# Patient Record
Sex: FEMALE | Race: BLACK OR AFRICAN AMERICAN | NOT HISPANIC OR LATINO | Employment: FULL TIME | URBAN - METROPOLITAN AREA
[De-identification: names, ages, dates, MRNs, and addresses within clinical notes are randomized per-mention and may not be internally consistent; named-entity substitution may affect disease eponyms.]

---

## 2017-01-04 ENCOUNTER — OFFICE VISIT (OUTPATIENT)
Dept: GASTROENTEROLOGY | Facility: CLINIC | Age: 32
End: 2017-01-04
Payer: COMMERCIAL

## 2017-01-04 VITALS
BODY MASS INDEX: 32.71 KG/M2 | HEART RATE: 104 BPM | WEIGHT: 203.5 LBS | HEIGHT: 66 IN | SYSTOLIC BLOOD PRESSURE: 121 MMHG | DIASTOLIC BLOOD PRESSURE: 85 MMHG

## 2017-01-04 DIAGNOSIS — K59.04 CHRONIC IDIOPATHIC CONSTIPATION: Primary | ICD-10-CM

## 2017-01-04 PROCEDURE — 99999 PR PBB SHADOW E&M-EST. PATIENT-LVL III: CPT | Mod: PBBFAC,,, | Performed by: NURSE PRACTITIONER

## 2017-01-04 PROCEDURE — 1159F MED LIST DOCD IN RCRD: CPT | Mod: S$GLB,,, | Performed by: NURSE PRACTITIONER

## 2017-01-04 PROCEDURE — 99214 OFFICE O/P EST MOD 30 MIN: CPT | Mod: S$GLB,,, | Performed by: NURSE PRACTITIONER

## 2017-01-04 NOTE — PROGRESS NOTES
Subjective:       Patient ID: Doroteo Sun is a 31 y.o. female.    Chief Complaint: Medication Refill (Linzess)    HPI  Presents to request refills of Linzess.  She used Linzess 290 mcg previously with good results.  She had a baby in November and reports that during pregnancy she was having regular bowel movements without medication.  However, post partum constipation has returned.  Denies rectal bleeding.  Denies abdominal pain apart from pain associated with constipation that is relieved after bowel movements.  Denies family history of colon cancer or colon polyps.    Review of Systems   Constitutional: Negative.  Negative for activity change, appetite change, fatigue, fever and unexpected weight change.   HENT: Negative.  Negative for sore throat and trouble swallowing.    Respiratory: Negative.  Negative for cough, choking and shortness of breath.    Cardiovascular: Negative.  Negative for chest pain.   Gastrointestinal: Positive for abdominal pain and constipation. Negative for abdominal distention, anal bleeding, blood in stool, diarrhea, nausea and vomiting.   Genitourinary: Negative.  Negative for difficulty urinating, dysuria and hematuria.   Musculoskeletal: Negative.  Negative for neck pain and neck stiffness.   Skin: Negative.    Neurological: Negative.  Negative for dizziness, syncope, weakness and light-headedness.   Psychiatric/Behavioral: Negative.        Objective:      Physical Exam   Constitutional: She is oriented to person, place, and time. She appears well-developed and well-nourished. No distress.   Eyes: No scleral icterus.   Cardiovascular: Normal rate.    Pulmonary/Chest: Effort normal. No respiratory distress.   Abdominal: Soft. Bowel sounds are normal. She exhibits no distension and no mass. There is no tenderness.   Musculoskeletal: Normal range of motion.   Neurological: She is alert and oriented to person, place, and time.   Skin: Skin is warm and dry. She is not diaphoretic. No  pallor.   Psychiatric: She has a normal mood and affect. Her behavior is normal. Judgment and thought content normal.   Vitals reviewed.      Assessment:       1. Chronic idiopathic constipation        Plan:         Doroteo was seen today for medication refill.    Diagnoses and all orders for this visit:    Chronic idiopathic constipation    Other orders  -     linaclotide (LINZESS) 290 mcg Cap; Take 1 capsule (290 mcg total) by mouth once daily.       -   High fiber diet  -   Fiber supplement  -   Sheldahl fluid intake   -   Exercise    -   Resume LInzess

## 2017-01-04 NOTE — PROGRESS NOTES
Doroteo Sun  has not been to PT since 7/18/16 and is therefore discharged from PT.  Please see the last note for her most recent functional status.    Rosa Melgoza, PT, DPT

## 2017-01-06 ENCOUNTER — TELEPHONE (OUTPATIENT)
Dept: GASTROENTEROLOGY | Facility: CLINIC | Age: 32
End: 2017-01-06

## 2017-01-06 RX ORDER — LUBIPROSTONE 8 UG/1
8 CAPSULE ORAL 2 TIMES DAILY
Qty: 60 CAPSULE | Refills: 5 | Status: SHIPPED | OUTPATIENT
Start: 2017-01-06 | End: 2017-07-31

## 2017-01-06 NOTE — TELEPHONE ENCOUNTER
Unable to afford Linzess.  Now $100+ a month.    Will send rx for amitiza to see if this is covered.

## 2017-01-06 NOTE — TELEPHONE ENCOUNTER
----- Message from YVETTE Harrison sent at 1/6/2017  1:06 PM CST -----  Contact: [phone]/self  Let pt know that i have sent in another medication for constipation ( Amitiza)  to see if this is less expensive.  She can check with the pharmacist and let us know if this does not work   ----- Message -----     From: Sivakumar Bloom MA     Sent: 1/6/2017  12:51 PM       To: YVETTE Harrison    Medication is still over  $100.00  ----- Message -----     From: Iqra Gonzalez     Sent: 1/6/2017  12:10 PM       To: [facility]lan [facility] Staff    Pt would like to speak the nurse regarding medication.  Please advise

## 2017-07-31 ENCOUNTER — OFFICE VISIT (OUTPATIENT)
Dept: FAMILY MEDICINE | Facility: CLINIC | Age: 32
End: 2017-07-31
Payer: COMMERCIAL

## 2017-07-31 ENCOUNTER — TELEPHONE (OUTPATIENT)
Dept: FAMILY MEDICINE | Facility: CLINIC | Age: 32
End: 2017-07-31

## 2017-07-31 VITALS
DIASTOLIC BLOOD PRESSURE: 60 MMHG | SYSTOLIC BLOOD PRESSURE: 116 MMHG | HEART RATE: 102 BPM | WEIGHT: 175.06 LBS | OXYGEN SATURATION: 98 % | BODY MASS INDEX: 28.25 KG/M2

## 2017-07-31 DIAGNOSIS — L70.9 ACNE, UNSPECIFIED ACNE TYPE: ICD-10-CM

## 2017-07-31 DIAGNOSIS — F41.9 ANXIETY DISORDER, UNSPECIFIED TYPE: Primary | ICD-10-CM

## 2017-07-31 PROCEDURE — 99214 OFFICE O/P EST MOD 30 MIN: CPT | Mod: S$GLB,,, | Performed by: FAMILY MEDICINE

## 2017-07-31 PROCEDURE — 99999 PR PBB SHADOW E&M-EST. PATIENT-LVL III: CPT | Mod: PBBFAC,,, | Performed by: FAMILY MEDICINE

## 2017-07-31 RX ORDER — DOXYCYCLINE HYCLATE 100 MG
100 TABLET ORAL DAILY
Qty: 30 TABLET | Refills: 0
Start: 2017-07-31 | End: 2018-02-12

## 2017-07-31 RX ORDER — PHENTERMINE HYDROCHLORIDE 37.5 MG/1
37.5 CAPSULE ORAL EVERY MORNING
Qty: 30 CAPSULE | Refills: 0
Start: 2017-07-31 | End: 2017-08-11

## 2017-07-31 RX ORDER — ESCITALOPRAM OXALATE 10 MG/1
10 TABLET ORAL DAILY
Qty: 30 TABLET | Refills: 0 | Status: SHIPPED | OUTPATIENT
Start: 2017-07-31 | End: 2017-08-11 | Stop reason: SDUPTHER

## 2017-07-31 RX ORDER — TAZAROTENE 1 MG/G
CREAM TOPICAL NIGHTLY
Qty: 30 G | Refills: 0
Start: 2017-07-31 | End: 2018-02-12

## 2017-07-31 NOTE — TELEPHONE ENCOUNTER
----- Message from Desi Montes sent at 7/31/2017  7:27 AM CDT -----  No. [phone]   Patient is having anxiety and depression.   She would like an appointment today.   She will see another doctor.   Please call.

## 2017-08-04 ENCOUNTER — TELEPHONE (OUTPATIENT)
Dept: FAMILY MEDICINE | Facility: CLINIC | Age: 32
End: 2017-08-04

## 2017-08-04 ENCOUNTER — DOCUMENTATION ONLY (OUTPATIENT)
Dept: REHABILITATION | Facility: HOSPITAL | Age: 32
End: 2017-08-04

## 2017-08-04 NOTE — TELEPHONE ENCOUNTER
----- Message from Ying Garcia sent at 8/4/2017  8:56 AM CDT -----  Contact: self, [phone]  Patient called in requesting to speak with you regarding medication prescribed at last visit.  Please advise.

## 2017-08-04 NOTE — TELEPHONE ENCOUNTER
Spoke with patient about taking Lexapro during the day its making her to be drowsiness.  Patient was advised as per Dr Lee to take it at night per this weekend and log in how she is feeling.  patient to call on Monday 08/07/17 to check on medication side effects.  Patient also was advised not to drink alcohol with this medication.  Patient verbalized understanding.

## 2017-08-11 ENCOUNTER — OFFICE VISIT (OUTPATIENT)
Dept: FAMILY MEDICINE | Facility: CLINIC | Age: 32
End: 2017-08-11
Payer: COMMERCIAL

## 2017-08-11 VITALS
HEART RATE: 88 BPM | DIASTOLIC BLOOD PRESSURE: 88 MMHG | SYSTOLIC BLOOD PRESSURE: 128 MMHG | HEIGHT: 66 IN | WEIGHT: 167.56 LBS | BODY MASS INDEX: 26.93 KG/M2 | OXYGEN SATURATION: 100 %

## 2017-08-11 DIAGNOSIS — F41.9 ANXIETY DISORDER, UNSPECIFIED TYPE: Primary | ICD-10-CM

## 2017-08-11 DIAGNOSIS — M70.60 TROCHANTERIC BURSITIS, UNSPECIFIED LATERALITY: ICD-10-CM

## 2017-08-11 DIAGNOSIS — F41.9 ANXIETY: ICD-10-CM

## 2017-08-11 PROCEDURE — 3008F BODY MASS INDEX DOCD: CPT | Mod: S$GLB,,, | Performed by: FAMILY MEDICINE

## 2017-08-11 PROCEDURE — 99999 PR PBB SHADOW E&M-EST. PATIENT-LVL III: CPT | Mod: PBBFAC,,, | Performed by: FAMILY MEDICINE

## 2017-08-11 PROCEDURE — 99214 OFFICE O/P EST MOD 30 MIN: CPT | Mod: S$GLB,,, | Performed by: FAMILY MEDICINE

## 2017-08-11 RX ORDER — IBUPROFEN 800 MG/1
800 TABLET ORAL 3 TIMES DAILY PRN
Qty: 60 TABLET | Refills: 2 | Status: SHIPPED | OUTPATIENT
Start: 2017-08-11 | End: 2018-04-11 | Stop reason: SDUPTHER

## 2017-08-11 RX ORDER — ESCITALOPRAM OXALATE 10 MG/1
10 TABLET ORAL DAILY
Qty: 30 TABLET | Refills: 11 | Status: SHIPPED | OUTPATIENT
Start: 2017-08-11 | End: 2017-12-05 | Stop reason: SDUPTHER

## 2017-08-11 RX ORDER — MUPIROCIN 20 MG/G
OINTMENT TOPICAL 3 TIMES DAILY
Qty: 30 G | Refills: 1 | Status: SHIPPED | OUTPATIENT
Start: 2017-08-11 | End: 2018-02-12

## 2017-08-11 NOTE — PROGRESS NOTES
(Portions of this note were dictated using voice recognition software and may contain dictation related errors in spelling/grammar/syntax not found on text review)    CC:   Chief Complaint   Patient presents with    Follow-up    Medication Refill     mupirocin refill       HPI: 31 y.o. female last visit with me was in January 2016.  Recently saw Dr. Lee for anxiety, more pronounced after the birth of her son in November 2016.  Chart was reviewed.  Patient had complained of crying spells, sleep disturbance, episodes of elevated blood pressure.  She was started on Lexapro 10 mg daily.  Overall doing better, however more recently did notice a little bit edginess at night.  She e is taking this medication at night because of drowsiness.  Reports better sleep    Does have good support system with family to help take care of her son if needed    Concomitantly is on Adipex for weight loss, has not taken since Saturday 6 days ago    Would like a refill of ibuprofen that she will take for some back pain and leg pain symptoms.  Since she is back at work now she notices pain more.  She is trying exercise regularly    In the past would have a prescription for mupirocin for what sounds like some folliculitis or furuncle development which would seem to help.  She would like a refill of this just in case she needs it again.      Past Medical History:   Diagnosis Date    Acne     Chronic constipation        No past surgical history on file.    Family History   Problem Relation Age of Onset    Hypertension Other     Cancer Neg Hx     Diabetes Neg Hx     Breast cancer Neg Hx     Colon cancer Neg Hx     Ovarian cancer Neg Hx     Cervical cancer Neg Hx     Endometrial cancer Neg Hx     Vaginal cancer Neg Hx        Social History     Social History    Marital status: Single     Spouse name: N/A    Number of children: N/A    Years of education: N/A     Occupational History     [employer]     Social History Main Topics     Smoking status: Never Smoker    Smokeless tobacco: Never Used    Alcohol use No    Drug use: No    Sexual activity: Yes     Partners: Male     Birth control/ protection: None     Other Topics Concern    Not on file     Social History Narrative    No narrative on file     Lab Results   Component Value Date    WBC 9.55 11/16/2016    HGB 9.4 (L) 11/16/2016    HCT 29.1 (L) 11/16/2016     (H) 11/16/2016    CHOL 156 12/03/2013    TRIG 43 12/03/2013    HDL 64 12/03/2013    ALT 22 11/16/2016    AST 29 11/16/2016     11/16/2016    K 3.9 11/16/2016     11/16/2016    CREATININE 0.8 11/16/2016    BUN 13 11/16/2016    CO2 26 11/16/2016    TSH 0.772 02/12/2015    INR 1.0 11/16/2016    LDLCALC 83.4 12/03/2013    GLU 90 11/16/2016       ROS:  GENERAL: No fever, chills, fatigability or weight loss.  SKIN: Above  HEAD: No headaches.  EYES: No visual changes  EARS: No ear pain or changes in hearing.  NOSE: No congestion or rhinorrhea.  MOUTH & THROAT: No hoarseness, change in voice, or sore throat.  NODES: Denies swollen glands.  CHEST: Denies RANDALL, cyanosis, wheezing, cough and sputum production.  CARDIOVASCULAR: Denies chest pain, PND, orthopnea.  ABDOMEN: No nausea, vomiting, or changes in bowel function.  URINARY: No flank pain, dysuria or hematuria.  PERIPHERAL VASCULAR: No claudication or cyanosis.  MUSCULOSKELETAL: Above  NEUROLOGIC: No weakness or numbness.  PSYCHIATRIC: Above    Vital signs reviewed  PE:   APPEARANCE: Well nourished, well developed, in no acute distress.    HEAD: Normocephalic, atraumatic.  EYES:     Conjunctivae noninjected.   CHEST: Good inspiratory effort. Lungs clear to auscultation with no wheezes or crackles.  CARDIOVASCULAR: Normal S1, S2. No rubs, murmurs, or gallops.   PSYCHIATRIC: Pleasant demeanor.  Affect is euthymic.  Mood is normal today.  Normal speech, thought, judgment, insight.    IMPRESSION  1. Anxiety disorder, unspecified type    2. Trochanteric bursitis,  unspecified laterality            PLAN  Would recommend continuing Lexapro for right now at 10 mg.  Over the course of the next few weeks if she still notices this edginess , can increase to 20 mg.  I would recommend stopping Adipex altogether    Trochanteric bursitis history, has recurrence of symptoms with back and leg pains since going back to work.  I have given her perception of ibuprofen have on hand    For occasional folliculitis or furuncle symptoms, she had been using Bactroban ointment which has been refilled in case she needs it    Call over the next month if she needs increase in Lexapro.  Return to the clinic for any more severe symptoms

## 2017-08-25 ENCOUNTER — TELEPHONE (OUTPATIENT)
Dept: GASTROENTEROLOGY | Facility: CLINIC | Age: 32
End: 2017-08-25

## 2017-12-05 ENCOUNTER — TELEPHONE (OUTPATIENT)
Dept: FAMILY MEDICINE | Facility: CLINIC | Age: 32
End: 2017-12-05

## 2017-12-05 DIAGNOSIS — F41.9 ANXIETY DISORDER, UNSPECIFIED TYPE: ICD-10-CM

## 2017-12-05 RX ORDER — ESCITALOPRAM OXALATE 10 MG/1
15 TABLET ORAL DAILY
Qty: 45 TABLET | Refills: 11 | Status: SHIPPED | OUTPATIENT
Start: 2017-12-05 | End: 2018-06-19 | Stop reason: SDUPTHER

## 2017-12-05 NOTE — TELEPHONE ENCOUNTER
----- Message from Desi Montes sent at 12/5/2017  9:46 AM CST -----  No. [phone]    Patient would like to start taking Lexapro 15mg.  She is taking 10mg now.   [facility] in [place]

## 2017-12-06 ENCOUNTER — TELEPHONE (OUTPATIENT)
Dept: FAMILY MEDICINE | Facility: CLINIC | Age: 32
End: 2017-12-06

## 2017-12-06 NOTE — TELEPHONE ENCOUNTER
----- Message from Demetrio Sow sent at 12/6/2017  8:15 AM CST -----  Contact: self/[phone]  Patient called to state she never received a call back on yesterday regarding her request.    Please call and advise.

## 2017-12-06 NOTE — TELEPHONE ENCOUNTER
Advised patient that increase in dosage was sent to pharmacy yesterday.  Patient wanted to confirm the dosage, stated the person that took her message gave her the dosage but wanted to confirm with me.  Advised that she was to take 1.5 tablet (15mg)

## 2018-02-12 ENCOUNTER — OFFICE VISIT (OUTPATIENT)
Dept: FAMILY MEDICINE | Facility: CLINIC | Age: 33
End: 2018-02-12
Payer: COMMERCIAL

## 2018-02-12 VITALS
SYSTOLIC BLOOD PRESSURE: 116 MMHG | HEIGHT: 66 IN | TEMPERATURE: 99 F | OXYGEN SATURATION: 100 % | HEART RATE: 101 BPM | BODY MASS INDEX: 29.34 KG/M2 | WEIGHT: 182.56 LBS | DIASTOLIC BLOOD PRESSURE: 84 MMHG

## 2018-02-12 DIAGNOSIS — J06.9 UPPER RESPIRATORY TRACT INFECTION, UNSPECIFIED TYPE: Primary | ICD-10-CM

## 2018-02-12 PROCEDURE — 99213 OFFICE O/P EST LOW 20 MIN: CPT | Mod: S$GLB,,, | Performed by: FAMILY MEDICINE

## 2018-02-12 PROCEDURE — 3008F BODY MASS INDEX DOCD: CPT | Mod: S$GLB,,, | Performed by: FAMILY MEDICINE

## 2018-02-12 PROCEDURE — 99999 PR PBB SHADOW E&M-EST. PATIENT-LVL III: CPT | Mod: PBBFAC,,, | Performed by: FAMILY MEDICINE

## 2018-02-12 RX ORDER — ISOTRETINOIN 40 MG/1
40 CAPSULE ORAL 2 TIMES DAILY
COMMUNITY
End: 2018-09-12

## 2018-02-12 NOTE — PROGRESS NOTES
(Portions of this note were dictated using voice recognition software and may contain dictation related errors in spelling/grammar/syntax not found on text review)    CC:   Chief Complaint   Patient presents with    Sinusitis    Nasal Congestion       HPI: 32 y.o. female last seen in August.  Here for urgent care.  Complains of four-day history of mild sore throat, mild cough, significant nasal congestion and sinus pressure mainly on the left side.  No fevers or body aches.  No chest pain or shortness of breath.  No nausea, vomiting, diarrhea.  Multiple sick contacts in her family.  Tried some Advil Cold and Sinus over-the-counter which was of no help.  She later took some Claritin for couple of days    Past Medical History:   Diagnosis Date    Acne     Anxiety     Chronic constipation        No past surgical history on file.    Family History   Problem Relation Age of Onset    Hypertension Other     Cancer Neg Hx     Diabetes Neg Hx     Breast cancer Neg Hx     Colon cancer Neg Hx     Ovarian cancer Neg Hx     Cervical cancer Neg Hx     Endometrial cancer Neg Hx     Vaginal cancer Neg Hx        Social History     Social History    Marital status: Single     Spouse name: N/A    Number of children: N/A    Years of education: N/A     Occupational History     [employer]     Social History Main Topics    Smoking status: Never Smoker    Smokeless tobacco: Never Used    Alcohol use No    Drug use: No    Sexual activity: Yes     Partners: Male     Birth control/ protection: None     Other Topics Concern    Not on file     Social History Narrative    No narrative on file       ROS:  GENERAL: No fever, chills, fatigability or weight loss.  SKIN: No rashes, no itching.  HEAD: Above.  EYES: No visual changes  EARS: No ear pain or changes in hearing.  NOSE: Above.  MOUTH & THROAT: Above.  NODES: Denies swollen glands.  CHEST: Above.  CARDIOVASCULAR: Denies chest pain, PND, orthopnea.  ABDOMEN: No nausea,  vomiting, or changes in bowel function.  URINARY: No flank pain, dysuria or hematuria.  PERIPHERAL VASCULAR: No claudication or cyanosis.  MUSCULOSKELETAL: No joint stiffness or swelling. Denies back pain.  NEUROLOGIC: No weakness or numbness.    Vital signs reviewed  PE:   APPEARANCE: Well nourished, well developed, in no acute distress.    HEAD: Normocephalic, atraumatic.  Right maxillary sinus tenderness  EYES: PERRL. EOMI.   Conjunctivae noninjected.  EARS: TM's intact. Light reflex normal. No retraction or perforation  NOSE left greater than right turbinate edema.  MOUTH & THROAT: No tonsillar enlargement. No pharyngeal erythema or exudate.  Mild cobblestoning noted  NECK: Supple with no cervical lymphadenopathy.    CHEST: Good inspiratory effort. Lungs clear to auscultation with no wheezes or crackles.  CARDIOVASCULAR: Normal S1, S2. No rubs, murmurs, or gallops.  ABDOMEN: Bowel sounds normal. Not distended. Soft. No tenderness or masses. No organomegaly.  EXTREMITIES: No edema, cyanosis, or clubbing.      IMPRESSION  1. Upper respiratory tract infection, unspecified type            PLAN  Advise expectant management with saline nasal rinses, Tylenol or NSAIDS as needed for pain or fever, Mucinex DM as needed for cough, and pseudoephedrine +/-antihistamine as needed for congestion. Throat lozenges and warm salt water gargles as needed for sore throat.  Expect symptom resolution to be between 7-10 days from time of onset of symptoms.  If symptoms last beyond this timeframe or patient develops worsening headaches, purulent nasal discharge, and fever, call or return to the office for further recommendations.

## 2018-03-14 LAB — HUMAN PAPILLOMAVIRUS (HPV): NORMAL

## 2018-03-28 ENCOUNTER — TELEPHONE (OUTPATIENT)
Dept: FAMILY MEDICINE | Facility: CLINIC | Age: 33
End: 2018-03-28

## 2018-03-28 NOTE — TELEPHONE ENCOUNTER
Patient is concerned that she is gaining weight and is concerned that it is the Lexapro that is causing the weight gain.  Please advise.

## 2018-03-28 NOTE — TELEPHONE ENCOUNTER
----- Message from Desi Montes sent at 3/28/2018  7:32 AM CDT -----  No. [phone]    Can Lexapro be the reason patient is gaining weight.   Please call.

## 2018-04-03 RX ORDER — TRIAMCINOLONE ACETONIDE 1 MG/G
CREAM TOPICAL
COMMUNITY
Start: 2018-01-29

## 2018-04-11 ENCOUNTER — TELEPHONE (OUTPATIENT)
Dept: GASTROENTEROLOGY | Facility: CLINIC | Age: 33
End: 2018-04-11

## 2018-04-11 RX ORDER — IBUPROFEN 800 MG/1
800 TABLET ORAL 3 TIMES DAILY PRN
Qty: 60 TABLET | Refills: 2 | Status: SHIPPED | OUTPATIENT
Start: 2018-04-11 | End: 2018-09-14 | Stop reason: SDUPTHER

## 2018-04-11 NOTE — TELEPHONE ENCOUNTER
----- Message from Demetrio Sow sent at 4/11/2018  1:33 PM CDT -----  Contact: self/[phone]  Patient is requesting a refill on her medications.  Please call and advise when sent to pharmacy.    ibuprofen (ADVIL,MOTRIN) 800 MG tablet    [facility] [address] [place] [address] - [address] [place] [address]

## 2018-04-11 NOTE — TELEPHONE ENCOUNTER
----- Message from Merary Macdonald MA sent at 4/11/2018  3:23 PM CDT -----  Contact: self/[phone]  Is this okay to refill?  ----- Message -----  From: Demetrio Sow  Sent: 4/11/2018   1:33 PM  To: Ruslan WAGGONER Staff    Patient is requesting a refill on her medications.  Please call and advise when sent to pharmacy.    linaclotide (LINZESS) 290 mcg [facility] [address] [place] [address]

## 2018-04-13 ENCOUNTER — LAB VISIT (OUTPATIENT)
Dept: LAB | Facility: HOSPITAL | Age: 33
End: 2018-04-13
Attending: FAMILY MEDICINE
Payer: COMMERCIAL

## 2018-04-13 ENCOUNTER — OFFICE VISIT (OUTPATIENT)
Dept: FAMILY MEDICINE | Facility: CLINIC | Age: 33
End: 2018-04-13
Payer: COMMERCIAL

## 2018-04-13 VITALS
DIASTOLIC BLOOD PRESSURE: 90 MMHG | SYSTOLIC BLOOD PRESSURE: 120 MMHG | HEART RATE: 86 BPM | TEMPERATURE: 99 F | HEIGHT: 66 IN | OXYGEN SATURATION: 95 % | BODY MASS INDEX: 29.02 KG/M2 | WEIGHT: 180.56 LBS

## 2018-04-13 DIAGNOSIS — R55 SYNCOPE, UNSPECIFIED SYNCOPE TYPE: Primary | ICD-10-CM

## 2018-04-13 DIAGNOSIS — R03.0 ELEVATED BLOOD PRESSURE READING: ICD-10-CM

## 2018-04-13 DIAGNOSIS — R55 SYNCOPE, UNSPECIFIED SYNCOPE TYPE: ICD-10-CM

## 2018-04-13 LAB
ALBUMIN SERPL BCP-MCNC: 3.7 G/DL
ALP SERPL-CCNC: 74 U/L
ALT SERPL W/O P-5'-P-CCNC: 43 U/L
ANION GAP SERPL CALC-SCNC: 9 MMOL/L
AST SERPL-CCNC: 36 U/L
BASOPHILS # BLD AUTO: 0.02 K/UL
BASOPHILS NFR BLD: 0.5 %
BILIRUB SERPL-MCNC: 0.5 MG/DL
BUN SERPL-MCNC: 12 MG/DL
CALCIUM SERPL-MCNC: 9.4 MG/DL
CHLORIDE SERPL-SCNC: 103 MMOL/L
CHOLEST SERPL-MCNC: 183 MG/DL
CHOLEST/HDLC SERPL: 3.5 {RATIO}
CO2 SERPL-SCNC: 27 MMOL/L
CREAT SERPL-MCNC: 0.8 MG/DL
DIFFERENTIAL METHOD: ABNORMAL
EOSINOPHIL # BLD AUTO: 0.1 K/UL
EOSINOPHIL NFR BLD: 2.3 %
ERYTHROCYTE [DISTWIDTH] IN BLOOD BY AUTOMATED COUNT: 14.5 %
EST. GFR  (AFRICAN AMERICAN): >60 ML/MIN/1.73 M^2
EST. GFR  (NON AFRICAN AMERICAN): >60 ML/MIN/1.73 M^2
ESTIMATED AVG GLUCOSE: 94 MG/DL
GLUCOSE SERPL-MCNC: 87 MG/DL
HBA1C MFR BLD HPLC: 4.9 %
HCT VFR BLD AUTO: 38.5 %
HDLC SERPL-MCNC: 52 MG/DL
HDLC SERPL: 28.4 %
HGB BLD-MCNC: 11.8 G/DL
IMM GRANULOCYTES # BLD AUTO: 0.02 K/UL
IMM GRANULOCYTES NFR BLD AUTO: 0.5 %
LDLC SERPL CALC-MCNC: 111.2 MG/DL
LYMPHOCYTES # BLD AUTO: 1.5 K/UL
LYMPHOCYTES NFR BLD: 36.9 %
MCH RBC QN AUTO: 25.4 PG
MCHC RBC AUTO-ENTMCNC: 30.6 G/DL
MCV RBC AUTO: 83 FL
MONOCYTES # BLD AUTO: 0.4 K/UL
MONOCYTES NFR BLD: 10.1 %
NEUTROPHILS # BLD AUTO: 2 K/UL
NEUTROPHILS NFR BLD: 49.7 %
NONHDLC SERPL-MCNC: 131 MG/DL
NRBC BLD-RTO: 0 /100 WBC
PLATELET # BLD AUTO: 410 K/UL
PMV BLD AUTO: 9.8 FL
POTASSIUM SERPL-SCNC: 3.7 MMOL/L
PROT SERPL-MCNC: 8.1 G/DL
RBC # BLD AUTO: 4.65 M/UL
SODIUM SERPL-SCNC: 139 MMOL/L
TRIGL SERPL-MCNC: 99 MG/DL
TSH SERPL DL<=0.005 MIU/L-ACNC: 1.64 UIU/ML
WBC # BLD AUTO: 3.96 K/UL

## 2018-04-13 PROCEDURE — 85025 COMPLETE CBC W/AUTO DIFF WBC: CPT

## 2018-04-13 PROCEDURE — 80061 LIPID PANEL: CPT

## 2018-04-13 PROCEDURE — 99999 PR PBB SHADOW E&M-EST. PATIENT-LVL IV: CPT | Mod: PBBFAC,,, | Performed by: FAMILY MEDICINE

## 2018-04-13 PROCEDURE — 80053 COMPREHEN METABOLIC PANEL: CPT

## 2018-04-13 PROCEDURE — 93005 ELECTROCARDIOGRAM TRACING: CPT | Mod: S$GLB,,, | Performed by: FAMILY MEDICINE

## 2018-04-13 PROCEDURE — 84443 ASSAY THYROID STIM HORMONE: CPT

## 2018-04-13 PROCEDURE — 83036 HEMOGLOBIN GLYCOSYLATED A1C: CPT

## 2018-04-13 PROCEDURE — 99214 OFFICE O/P EST MOD 30 MIN: CPT | Mod: S$GLB,,, | Performed by: FAMILY MEDICINE

## 2018-04-13 PROCEDURE — 93010 ELECTROCARDIOGRAM REPORT: CPT | Mod: S$GLB,,, | Performed by: INTERNAL MEDICINE

## 2018-04-13 PROCEDURE — 36415 COLL VENOUS BLD VENIPUNCTURE: CPT | Mod: PO

## 2018-04-13 RX ORDER — PHENTERMINE HYDROCHLORIDE 37.5 MG/1
37.5 CAPSULE ORAL EVERY MORNING
Refills: 0
Start: 2018-04-13 | End: 2018-04-17

## 2018-04-13 RX ORDER — DROSPIRENONE AND ETHINYL ESTRADIOL 0.02-3(28)
1 KIT ORAL DAILY
Qty: 1 TABLET | Refills: 0
Start: 2018-04-13 | End: 2018-09-12

## 2018-04-13 NOTE — PATIENT INSTRUCTIONS
Stop ADIPEX  Workup today, then follow up with PCP  If happens again, RTC or go to ED    Causes of Syncope  Syncope (fainting) has many causes. Sometimes it is not serious. In other cases, syncope is a sign of a heart problem. But treatment can help    When syncope is not serious  Your healthcare provider may call your problem vasovagal syncope, reflex syncope, or orthostatic hypotension. These types of syncope are generally not serious. They can be caused by:  · Strong feelings, such as anxiety or fear. A nerve signal may briefly change your heart rate and lower your blood pressure too much.  · Standing for too long. Standing may cause blood to pool in your legs. When this happens, your brain may not receive all the blood it needs.  · Standing up too quickly. Your blood pressure may not adjust fast enough to changes in posture and may drop too low. Certain medicines can also cause this problem. Examples of medicines that can cause a drop in blood pressure include diuretics, blood pressure medicines, and medicines for chest pain. Your pharmacist or healthcare provider can discuss these with you.  · Reaction to normal body functions. When you go to the bathroom, have gastrointestinal discomfort, nausea, or pain, your heart may have a natural reflex to slow down and lower blood pressure. This can result in syncope. This may also follow exercise, eating, laughter, weight lifting, or playing musical instruments like the trumpet or trombone.  When heart trouble causes syncope  A heart problem can decrease the amount of oxygen-rich blood that reaches the brain. Heart trouble can be serious and even life threatening if not treated:  · A slow heart rate. Electrical signals tell the chambers of the heart when to pump. But the signals may be slowed or blocked (heart block) as they travel on the hearts electrical pathways. This can be caused by aging, scarred heart tissue, or damage from heart disease. When the heart rate  slows, not enough blood is pumped.  · A fast heart rate. Certain problems can make the heart race. For instance, after a heart attack, also known as acute myocardial infarction, or AMI, abnormal electrical signals may be created. These signals can make the heart suddenly beat very fast. The heart pumps before the chambers can fill with blood. So less blood reaches the brain and other parts of the body. Illegal drugs, certain medicines, heart disease, or an inherited condition can also cause this.  · A heart valve problem. Blood travels through the chambers of the heart as it is pumped. Heart valves open and close to help move blood in the right direction. But a valve may not open or close fully, if its hardened or scarred. As a result, less blood is pumped through the heart to the brain and body. Most often, syncope occurs when a person's aortic valve is critically narrowed and he or she participates in  a strenuous activity.  · A heart muscle problem. Some people develop a thickened heart muscle that blocks blood flow out of the heart to the body. This is called hypertrophic cardiomyopathy. Being dehydrated and having hypertrophic cardiomyopathy can increase the risk for syncope.  Whatever the cause of syncope, it is important to be evaluated by your healthcare provider. You may need to be seen by a cardiologist, neurologist, or an ear, nose, and throat specialist. Do not drive, operate heavy machinery, or participate in activities in which you would be at risk for falls and injury if you have syncope and have not been evaluated.  Date Last Reviewed: 5/1/2016  © 7646-5597 [employer]. [address] [place] [address], [place] [address]. All rights reserved. This information is not intended as a substitute for professional medical care. Always follow your healthcare professional's instructions.
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
## 2018-04-14 ENCOUNTER — NURSE TRIAGE (OUTPATIENT)
Dept: ADMINISTRATIVE | Facility: CLINIC | Age: 33
End: 2018-04-14

## 2018-04-14 NOTE — TELEPHONE ENCOUNTER
"  Answer Assessment - Initial Assessment Questions  1. DESCRIPTION: "Describe your dizziness."      Lightheaded   2. LIGHTHEADED: "Do you feel lightheaded?" (e.g., somewhat faint, woozy, weak upon standing)      Feeling of faint   3. VERTIGO: "Do you feel like either you or the room is spinning or tilting?" (i.e. vertigo)      No   4. SEVERITY: "How bad is it?"  "Do you feel like you are going to faint?" "Can you stand and walk?"    - MILD - walking normally    - MODERATE - interferes with normal activities (e.g., work, school)     - SEVERE - unable to stand, requires support to walk, feels like passing out now.       Mild at time of call (more intensed earlier)  5. ONSET:  "When did the dizziness begin?"      Thursday   6. AGGRAVATING FACTORS: "Does anything make it worse?" (e.g., standing, change in head position)      Standing, moving bowel and menses   7. HEART RATE: "Can you tell me your heart rate?" "How many beats in 15 seconds?"  (Note: not all patients can do this)        ^HR but regular  8. CAUSE: "What do you think is causing the dizziness?"      Unsure   9. RECURRENT SYMPTOM: "Have you had dizziness before?" If so, ask: "When was the last time?" "What happened that time?"      Yes Thursday past   10. OTHER SYMPTOMS: "Do you have any other symptoms?" (e.g., fever, chest pain, vomiting, diarrhea, bleeding)      No   11. PREGNANCY: "Is there any chance you are pregnant?" "When was your last menstrual period?"      No    Protocols used: ST DIZZINESS-A-AH    "

## 2018-04-14 NOTE — TELEPHONE ENCOUNTER
Reason for Disposition   Dizziness caused by sudden or prolonged standing (all triage questions negative)     Occurred while standing, during bowel movement and menstrual cycle.    Protocols used: ST DIZZINESS-A-AH

## 2018-04-17 ENCOUNTER — OFFICE VISIT (OUTPATIENT)
Dept: FAMILY MEDICINE | Facility: CLINIC | Age: 33
End: 2018-04-17
Payer: COMMERCIAL

## 2018-04-17 ENCOUNTER — HOSPITAL ENCOUNTER (OUTPATIENT)
Dept: CARDIOLOGY | Facility: HOSPITAL | Age: 33
Discharge: HOME | End: 2018-04-17
Attending: FAMILY MEDICINE
Payer: COMMERCIAL

## 2018-04-17 ENCOUNTER — TELEPHONE (OUTPATIENT)
Dept: FAMILY MEDICINE | Facility: CLINIC | Age: 33
End: 2018-04-17

## 2018-04-17 ENCOUNTER — TELEPHONE (OUTPATIENT)
Dept: GASTROENTEROLOGY | Facility: CLINIC | Age: 33
End: 2018-04-17

## 2018-04-17 VITALS
HEART RATE: 103 BPM | DIASTOLIC BLOOD PRESSURE: 89 MMHG | HEIGHT: 66 IN | BODY MASS INDEX: 28.84 KG/M2 | OXYGEN SATURATION: 100 % | TEMPERATURE: 98 F | SYSTOLIC BLOOD PRESSURE: 121 MMHG | WEIGHT: 179.44 LBS

## 2018-04-17 DIAGNOSIS — R55 SYNCOPE, UNSPECIFIED SYNCOPE TYPE: ICD-10-CM

## 2018-04-17 DIAGNOSIS — R55 SYNCOPE, UNSPECIFIED SYNCOPE TYPE: Primary | ICD-10-CM

## 2018-04-17 DIAGNOSIS — R53.83 FATIGUE, UNSPECIFIED TYPE: ICD-10-CM

## 2018-04-17 DIAGNOSIS — F41.9 ANXIETY: ICD-10-CM

## 2018-04-17 DIAGNOSIS — G44.209 TENSION-TYPE HEADACHE, NOT INTRACTABLE, UNSPECIFIED CHRONICITY PATTERN: ICD-10-CM

## 2018-04-17 LAB
DIASTOLIC DYSFUNCTION: NO
ESTIMATED PA SYSTOLIC PRESSURE: 12.11
MITRAL VALVE MOBILITY: NORMAL
MITRAL VALVE REGURGITATION: NORMAL
RETIRED EF AND QEF - SEE NOTES: 55 (ref 55–65)
TRICUSPID VALVE REGURGITATION: NORMAL

## 2018-04-17 PROCEDURE — 93306 TTE W/DOPPLER COMPLETE: CPT

## 2018-04-17 PROCEDURE — 93306 TTE W/DOPPLER COMPLETE: CPT | Mod: 26,,, | Performed by: INTERNAL MEDICINE

## 2018-04-17 PROCEDURE — 99215 OFFICE O/P EST HI 40 MIN: CPT | Mod: S$GLB,,, | Performed by: FAMILY MEDICINE

## 2018-04-17 PROCEDURE — 99999 PR PBB SHADOW E&M-EST. PATIENT-LVL III: CPT | Mod: PBBFAC,,, | Performed by: FAMILY MEDICINE

## 2018-04-17 NOTE — PROGRESS NOTES
(Portions of this note were dictated using voice recognition software and may contain dictation related errors in spelling/grammar/syntax not found on text review)    CC:   Chief Complaint   Patient presents with    Follow-up       HPI: 32 y.o. female Last visit February 2018 for upper respiratory symptoms.  Recently was seen at [facility] for syncopal episode.  Medical documentation has been reviewed.  Was at a job shadow and was standing for around 3 hours when she Was feeling some abdominal discomfort that she thought was secondary to menses.  Reported that the room started to spin and felt warm and had an episode where she seemed to black out.  She sat down in a chair.  She did mention losing vision in both eyes in hearing in both ears.  Episode lasted for several minutes and she seemed to regain consciousness.  No postictal status.  Had reported feeling quite fatigued prior.  Workup was done including lab work which is noted as below and was otherwise unremarkable.  EKG was normal.  Echo was ordered, not completed yet.  Of note, she is on Adipex prescribed by an outside physician.  We had discussed her stopping Adipex because of past issues with anxiety, and when she had seen  for syncopal episode , He also had significantly counseled her on stopping this medication especially in light of her symptoms and also elevated blood pressure in the office    Next day after appointment did have diarrhea (loose) with abd cramping, subsided next day. Since has been feeling fine. No fever/shortness of breath/CP. Slight dizziness couple of days ago. No further episodes of syncope.     Of note is on Lexapro for anxiety.  Was up to 20 mg that did go down to 10 mg out of concern for weight gain recently.      Past Medical History:   Diagnosis Date    Acne     Anxiety     Chronic constipation        No past surgical history on file.    Family History   Problem Relation Age of Onset    Hypertension Other      Cancer Neg Hx     Diabetes Neg Hx     Breast cancer Neg Hx     Colon cancer Neg Hx     Ovarian cancer Neg Hx     Cervical cancer Neg Hx     Endometrial cancer Neg Hx     Vaginal cancer Neg Hx        Social History     Social History    Marital status: Single     Spouse name: N/A    Number of children: N/A    Years of education: N/A     Occupational History     [employer]     Social History Main Topics    Smoking status: Never Smoker    Smokeless tobacco: Never Used    Alcohol use No    Drug use: No    Sexual activity: Yes     Partners: Male     Birth control/ protection: None     Other Topics Concern    Not on file     Social History Narrative    No narrative on file       Lab Results   Component Value Date    WBC 3.96 04/13/2018    HGB 11.8 (L) 04/13/2018    HCT 38.5 04/13/2018     (H) 04/13/2018    CHOL 183 04/13/2018    TRIG 99 04/13/2018    HDL 52 04/13/2018    ALT 43 04/13/2018    AST 36 04/13/2018     04/13/2018    K 3.7 04/13/2018     04/13/2018    CREATININE 0.8 04/13/2018    CALCIUM 9.4 04/13/2018    BUN 12 04/13/2018    CO2 27 04/13/2018    TSH 1.638 04/13/2018    INR 1.0 11/16/2016    HGBA1C 4.9 04/13/2018    LDLCALC 111.2 04/13/2018    GLU 87 04/13/2018           ROS:  GENERAL: Slight fatigue.  SKIN: No rashes, no itching.  HEAD: Some slight headaches.  EYES: No visual changes  EARS: No ear pain or changes in hearing.  NOSE: No congestion or rhinorrhea.  MOUTH & THROAT: No hoarseness, change in voice, or sore throat.  NODES: Denies swollen glands.  CHEST: Denies RANDALL, cyanosis, wheezing, cough and sputum production.  CARDIOVASCULAR: Denies chest pain, PND, orthopnea.  ABDOMEN: No nausea, vomiting, or changes in bowel function.  URINARY: No flank pain, dysuria or hematuria.  PERIPHERAL VASCULAR: No claudication or cyanosis.  MUSCULOSKELETAL: No joint stiffness or swelling. Denies back pain.  NEUROLOGIC: No weakness or numbness.    Vital signs reviewed  PE:   APPEARANCE:  Well nourished, well developed, in no acute distress.    HEAD: Normocephalic, atraumatic.  EYES: PERRL. EOMI.   Conjunctivae noninjected.  EARS: TM's intact. Light reflex normal. No retraction or perforation  NOSE: Mucosa pink. Airway clear.  MOUTH & THROAT: No tonsillar enlargement. No pharyngeal erythema or exudate.   NECK: Supple with no cervical lymphadenopathy.  No carotid bruits.  No thyromegaly  CHEST: Good inspiratory effort. Lungs clear to auscultation with no wheezes or crackles.  CARDIOVASCULAR: Normal S1, S2.  Regular rate No rubs, murmurs, or gallops.  ABDOMEN: Bowel sounds normal. Not distended. Soft. No tenderness or masses. No organomegaly.  EXTREMITIES: No edema, cyanosis, or clubbing.      IMPRESSION  1. Syncope, unspecified syncope type    2. Anxiety    3. BMI 28.0-28.9,adult    4. Tension-type headache, not intractable, unspecified chronicity pattern    5. Fatigue, unspecified type            PLAN  Reviewed recent medical documentation, labs, EKG.  I do agree with patient proceeding with the echo for which she has he appointment later on this afternoon.  We can follow back up on the results of this.  Differential for her symptoms includes orthostasis given long periods of standing, vasovagal syndrome given the above positional factors along with some development of abdominal discomfort along with her menses causing some distress, feeling of warmth, followed by the above episode..  I absolutely do agree with staying off the Adipex and other stimulants at this time.  I think its reasonable for her to continue her Lexapro 10 mg at this time as long as she feels her anxiety is well controlled.  Reviewed her labs and she was reassured as to overall normal lab work.  Notify for any recurrent episodes that made further cardiogenic workup like outpatient cardiac monitoring.  At her new job she will be standing for long period of time.  We did discuss some frequent calf pumps especially when long periods  of standing.    Headache: Will advise Tylenol.  Avoid ibuprofen for right now to avoid any elevation of blood pressure.    Fatigue: Slight, slowly improving.  Advise adequate hydration.  She does think that perhaps stress could have a part of plan this is well, therapy as above

## 2018-04-17 NOTE — TELEPHONE ENCOUNTER
Spoke with patient and in formed her of her normal EKG,and Normal labs. Patient voiced understanding.

## 2018-04-18 ENCOUNTER — TELEPHONE (OUTPATIENT)
Dept: FAMILY MEDICINE | Facility: CLINIC | Age: 33
End: 2018-04-18

## 2018-04-23 ENCOUNTER — TELEPHONE (OUTPATIENT)
Dept: FAMILY MEDICINE | Facility: CLINIC | Age: 33
End: 2018-04-23

## 2018-04-23 NOTE — TELEPHONE ENCOUNTER
Patient does think that she constantly feels bloated and has thought of what you discussed with him at her last visit.  Patient is willing to be on a fluid pill per last appointment.  Please advise.

## 2018-04-23 NOTE — TELEPHONE ENCOUNTER
I would not recommend a fluid pill.  Her blood pressure is fine and actually she saw me recently for an episode of passing out which I think could be worse with any dehydration.  Therefore I do not think a fluid pill would be appropriate for her.  If she is feeling some abdominal discomfort like bloating she could take some over-the-counter Zantac which may be of help

## 2018-04-23 NOTE — TELEPHONE ENCOUNTER
----- Message from Roberta Sanchez sent at 4/23/2018  8:30 AM CDT -----  Contact: [phone]/ self   Pt its requesting a praescription for a fluid pill sent to [facility] in [place] . Please advise

## 2018-04-24 ENCOUNTER — TELEPHONE (OUTPATIENT)
Dept: FAMILY MEDICINE | Facility: CLINIC | Age: 33
End: 2018-04-24

## 2018-04-24 NOTE — TELEPHONE ENCOUNTER
Advised patient that Dr Sunshine would not recommend a fluid pill. Since her blood pressure is fine and she saw Dr Sunshine recently for an episode of passing out, he does not think a fluid pill would be appropriate as it may cause dehydration.    If she is feeling some abdominal discomfort like bloating she could take some over-the-counter Zantac which may be of help.  Patient verbalized understanding.

## 2018-05-17 ENCOUNTER — OFFICE VISIT (OUTPATIENT)
Dept: FAMILY MEDICINE | Facility: CLINIC | Age: 33
End: 2018-05-17
Attending: FAMILY MEDICINE
Payer: COMMERCIAL

## 2018-05-17 VITALS
HEIGHT: 66 IN | DIASTOLIC BLOOD PRESSURE: 88 MMHG | BODY MASS INDEX: 29.34 KG/M2 | HEART RATE: 93 BPM | SYSTOLIC BLOOD PRESSURE: 120 MMHG | WEIGHT: 182.56 LBS | TEMPERATURE: 98 F | OXYGEN SATURATION: 99 %

## 2018-05-17 DIAGNOSIS — G44.219 EPISODIC TENSION-TYPE HEADACHE, NOT INTRACTABLE: ICD-10-CM

## 2018-05-17 DIAGNOSIS — K59.04 CHRONIC IDIOPATHIC CONSTIPATION: ICD-10-CM

## 2018-05-17 DIAGNOSIS — R03.0 ELEVATED BP WITHOUT DIAGNOSIS OF HYPERTENSION: Primary | ICD-10-CM

## 2018-05-17 PROCEDURE — 3008F BODY MASS INDEX DOCD: CPT | Mod: CPTII,S$GLB,, | Performed by: FAMILY MEDICINE

## 2018-05-17 PROCEDURE — 99999 PR PBB SHADOW E&M-EST. PATIENT-LVL III: CPT | Mod: PBBFAC,,, | Performed by: FAMILY MEDICINE

## 2018-05-17 PROCEDURE — 99214 OFFICE O/P EST MOD 30 MIN: CPT | Mod: S$GLB,,, | Performed by: FAMILY MEDICINE

## 2018-05-17 RX ORDER — PROPRANOLOL HYDROCHLORIDE 10 MG/1
10 TABLET ORAL 2 TIMES DAILY
Qty: 60 TABLET | Refills: 0 | Status: SHIPPED | OUTPATIENT
Start: 2018-05-17 | End: 2018-11-30 | Stop reason: SDUPTHER

## 2018-05-17 NOTE — PATIENT INSTRUCTIONS
Treating Constipation    Constipation is a common and often uncomfortable problem. Constipation means you have bowel movements fewer than 3 times per week, or strain to pass hard, dry stool. It can last a short time. Or it can be a problem that never seems to go away. The good news is that it can often be treated and controlled.  Eat more fiber  One of the best ways to help treat constipation is to increase your fiber intake. You can do this either through diet or by using fiber supplements. Fiber (in whole grains, fruits, and vegetables) adds bulk and absorbs water to soften the stool. This helps the stool pass through the colon more easily. When you increase your fiber intake, do it slowly to avoid side effects such as bloating. Also increase the amount of water that you drink. Eating more of the following foods can add fiber to your diet.  · High-fiber cereals  · Whole grains, bran, and brown rice  · Vegetables such as carrots, broccoli, and greens  · Fresh fruits (especially apples, pears, and dried fruits like raisins and apricots)  · Nuts and legumes (especially beans such as lentils, kidney beans, and lima beans)  Get physically active  Exercise helps improve the working of your colon which helps ease constipation. Try to get some physical activity every day. If you havent been active for a while, talk to your healthcare provider before starting again.  Laxatives  Your healthcare provider may suggest an over-the-counter product to help ease your constipation. He or she may suggest the use of bulk-forming agents or laxatives. The use of laxatives, if used as directed, is common and safe. Follow directions carefully when using them. See your healthcare provider for new-onset constipation, or long-term constipation, to rule out other causes such as medicines or thyroid disease.  Date Last Reviewed: 7/1/2016  © 0659-3037 [employer]. [address] [place] [address], [place] [address]. All rights reserved.  This information is not intended as a substitute for professional medical care. Always follow your healthcare professional's instructions.        Constipation (Adult)  Constipation means that you have bowel movements that are less frequent than usual. Stools often become very hard and difficult to pass.  Constipation is very common. At some point in life it affects almost everyone. Since everyone's bowel habits are different, what is constipation to one person may not be to another. Your healthcare provider may do tests to diagnose constipation. It depends on what he or she finds when evaluating you.    Symptoms of constipation include:  · Abdominal pain  · Bloating  · Vomiting  · Painful bowel movements  · Itching, swelling, bleeding, or pain around the anus  Causes  Constipation can have many causes. These include:  · Diet low in fiber  · Too much dairy  · Not drinking enough liquids  · Lack of exercise or physical activity. This is especially true for older adults.  · Changes in lifestyle or daily routine, including pregnancy, aging, work, and travel  · Frequent use or misuse of laxatives  · Ignoring the urge to have a bowel movement or delaying it until later  · Medicines, such as certain prescription pain medicines, iron supplements, antacids, certain antidepressants, and calcium supplements  · Diseases like irritable bowel syndrome, bowel obstructions, stroke, diabetes, thyroid disease, Parkinson disease, hemorrhoids, and colon cancer  Complications  Potential complications of constipation can include:  · Hemorrhoids  · Rectal bleeding from hemorrhoids or anal fissures (skin tears)  · Hernias  · Dependency on laxatives  · Chronic constipation  · Fecal impaction  · Bowel obstruction or perforation  Home care  All treatment should be done after talking with your healthcare provider. This is especially true if you have another medical problems, are taking prescription medicines, or are an older adult. Treatment  most often involves lifestyle changes. You may also need medicines. Your healthcare provider will tell you which will work best for you. Follow the advice below to help avoid this problem in the future.  Lifestyle changes  These lifestyle changes can help prevent constipation:  · Diet. Eat a high-fiber diet, with fresh fruit and vegetables, and reduce dairy intake, meats, and processed foods  · Fluids. It's important to get enough fluids each day. Drink plenty of water when you eat more fiber. If you are on diet that limits the amount of fluid you can have, talk about this with your healthcare provider.  · Regular exercise. Check with your healthcare provider first.  Medications  Take any medicines as directed. Some laxatives are safe to use only every now and then. Others can be taken on a regular basis. Talk with your doctor or pharmacist if you have questions.  Prescription pain medicines can cause constipation. If you are taking this kind of medicine, ask your healthcare provider if you should also take a stool softener.  Medicines you may take to treat constipation include:  · Fiber supplements  · Stool softeners  · Laxatives  · Enemas  · Rectal suppositories  Follow-up care  Follow up with your healthcare provider if symptoms don't get better in the next few days. You may need to have more tests or see a specialist.  Call 911  Call 911 if any of these occur:  · Trouble breathing  · Stiff, rigid abdomen that is severely painful to touch  · Confusion  · Fainting or loss of consciousness  · Rapid heart rate  · Chest pain  When to seek medical advice  Call your healthcare provider right away if any of these occur:  · Fever over 100.4°F (38°C)  · Failure to resume normal bowel movements  · Pain in your abdomen or back gets worse  · Nausea or vomiting  · Swelling in your abdomen  · Blood in the stool  · Black, tarry stool  · Involuntary weight loss  · Weakness  Date Last Reviewed: 12/30/2015  © 6642-7499 [employer]. [address] [place] [address], [place] [address]. All rights reserved. This information is not intended as a substitute for professional medical care. Always follow your healthcare professional's instructions.

## 2018-05-17 NOTE — PROGRESS NOTES
Subjective:       Patient ID: Doroteo Sun is a 32 y.o. female.    Chief Complaint: Hypertension and Headache    32 yr old pleasant black female with chronic idiopathic constipation, anxiety, and no other significant medical history presents today as new patient to me and for evaluation of headache and elevated BP. Onset 3-4 days ago and intermittent. She only has diastolic high BP. The headache is right sided and it comes ans goes. It is 4-5 out of town and aching type, non radiating and relieved with rest and NSAIDS. She also suffers constipation and has not had any BM for about a week. Details as follows -      History as below - reviewed       Headache    This is a new problem. The current episode started in the past 7 days. The problem occurs intermittently. The problem has been unchanged. The pain is located in the right unilateral region. The pain does not radiate. The pain quality is not similar to prior headaches. The quality of the pain is described as aching. The pain is at a severity of 4/10. The pain is moderate. Pertinent negatives include no abdominal pain, back pain, blurred vision, dizziness, eye pain, hearing loss, muscle aches, nausea, rhinorrhea, scalp tenderness, seizures, sore throat or tingling. Nothing aggravates the symptoms. The treatment provided mild relief. There is no history of cancer, immunosuppression, migraine headaches, migraines in the family, pseudotumor cerebri, recent head traumas or TMJ.   Constipation   This is a chronic problem. The current episode started more than 1 year ago. The problem has been gradually worsening since onset. Her stool frequency is 1 time per week or less. The stool is described as pellet like. The patient is not on a high fiber diet. She does not exercise regularly. There has not been adequate water intake. Pertinent negatives include no abdominal pain, back pain, difficulty urinating or nausea. Treatments tried: linzess. The treatment provided  moderate relief. There is no history of abdominal surgery, inflammatory bowel disease, metabolic disease, neurologic disease, neuromuscular disease or radiation treatment.     Review of Systems   Constitutional: Negative.  Negative for activity change, diaphoresis and unexpected weight change.   HENT: Negative.  Negative for congestion, ear discharge, hearing loss, rhinorrhea, sore throat and voice change.    Eyes: Negative.  Negative for blurred vision, pain, discharge and visual disturbance.   Respiratory: Negative.  Negative for chest tightness, shortness of breath and wheezing.    Cardiovascular: Negative.  Negative for chest pain.   Gastrointestinal: Positive for constipation. Negative for abdominal distention, abdominal pain, anal bleeding and nausea.   Endocrine: Negative.  Negative for cold intolerance, polydipsia and polyuria.   Genitourinary: Negative.  Negative for decreased urine volume, difficulty urinating, dysuria, frequency, menstrual problem and vaginal pain.   Musculoskeletal: Negative.  Negative for arthralgias, back pain, gait problem and myalgias.   Skin: Negative.  Negative for color change, pallor and wound.   Allergic/Immunologic: Negative.  Negative for environmental allergies and immunocompromised state.   Neurological: Positive for headaches. Negative for dizziness, tingling, tremors, seizures and speech difficulty.   Hematological: Negative.  Negative for adenopathy. Does not bruise/bleed easily.   Psychiatric/Behavioral: Negative.  Negative for agitation, confusion, decreased concentration, hallucinations, self-injury and suicidal ideas. The patient is not nervous/anxious.        PMH/PSH/FH/SH/MED/ALLERGY reviewed    Objective:       Vitals:    05/17/18 1708   BP: 120/88   Pulse: 93   Temp: 98.1 °F (36.7 °C)       Physical Exam   Constitutional: She is oriented to person, place, and time. She appears well-developed and well-nourished. No distress.   HENT:   Head: Normocephalic and  atraumatic.   Right Ear: External ear normal.   Left Ear: External ear normal.   Nose: Nose normal.   Mouth/Throat: Oropharynx is clear and moist. No oropharyngeal exudate.   Eyes: Conjunctivae and EOM are normal. Pupils are equal, round, and reactive to light. Right eye exhibits no discharge. Left eye exhibits no discharge. No scleral icterus.   Neck: Normal range of motion. Neck supple. No JVD present. No tracheal deviation present. No thyromegaly present.   Cardiovascular: Normal rate, regular rhythm, normal heart sounds and intact distal pulses.  Exam reveals no gallop and no friction rub.    No murmur heard.  Pulmonary/Chest: Effort normal and breath sounds normal. No stridor. She has no wheezes. She has no rales. She exhibits no tenderness.   Abdominal: Soft. Bowel sounds are normal. She exhibits no distension and no mass. There is no tenderness. There is no rebound and no guarding. No hernia.   Musculoskeletal: Normal range of motion. She exhibits no edema or tenderness.   Lymphadenopathy:     She has no cervical adenopathy.   Neurological: She is alert and oriented to person, place, and time. She has normal reflexes. She displays normal reflexes. No cranial nerve deficit. She exhibits normal muscle tone. Coordination normal.   Skin: Skin is warm and dry. No rash noted. She is not diaphoretic. No erythema. No pallor.   Psychiatric: She has a normal mood and affect. Her behavior is normal. Judgment and thought content normal.       Assessment:       1. Elevated BP without diagnosis of hypertension    2. Episodic tension-type headache, not intractable    3. Chronic idiopathic constipation        Plan:       Doroteo was seen today for hypertension and headache.    Diagnoses and all orders for this visit:    Elevated BP without diagnosis of hypertension  -     propranolol (INDERAL) 10 MG tablet; Take 1 tablet (10 mg total) by mouth 2 (two) times daily.    Episodic tension-type headache, not intractable  -      propranolol (INDERAL) 10 MG tablet; Take 1 tablet (10 mg total) by mouth 2 (two) times daily.    Chronic idiopathic constipation      Elevated BP/headache  -tension vs atypical migraine  -propranolol as needed    Constipation  -continue linzess and advised high fiber diet/exercise and increase fluids    Spent adequate time in obtaining history and explaining differentials    40 minutes spent during this visit of which greater than 50% devoted to face-face counseling and coordination of care regarding diagnosis and management plan    Follow-up if symptoms worsen or fail to improve.

## 2018-06-18 ENCOUNTER — TELEPHONE (OUTPATIENT)
Dept: FAMILY MEDICINE | Facility: CLINIC | Age: 33
End: 2018-06-18

## 2018-06-18 NOTE — TELEPHONE ENCOUNTER
----- Message from Salina Medrano sent at 6/18/2018 10:40 AM CDT -----  Contact: self/[phone]  The patients is having severe headaches and needs to be seen asap.

## 2018-06-19 ENCOUNTER — OFFICE VISIT (OUTPATIENT)
Dept: FAMILY MEDICINE | Facility: CLINIC | Age: 33
End: 2018-06-19
Payer: MEDICAID

## 2018-06-19 VITALS
TEMPERATURE: 98 F | OXYGEN SATURATION: 99 % | HEIGHT: 66 IN | WEIGHT: 181.88 LBS | SYSTOLIC BLOOD PRESSURE: 121 MMHG | HEART RATE: 103 BPM | BODY MASS INDEX: 29.23 KG/M2 | DIASTOLIC BLOOD PRESSURE: 85 MMHG

## 2018-06-19 DIAGNOSIS — F41.9 ANXIETY DISORDER, UNSPECIFIED TYPE: ICD-10-CM

## 2018-06-19 DIAGNOSIS — R63.5 WEIGHT GAIN: Primary | ICD-10-CM

## 2018-06-19 PROCEDURE — 99999 PR PBB SHADOW E&M-EST. PATIENT-LVL III: CPT | Mod: PBBFAC,,, | Performed by: FAMILY MEDICINE

## 2018-06-19 PROCEDURE — 99214 OFFICE O/P EST MOD 30 MIN: CPT | Mod: S$PBB,,, | Performed by: FAMILY MEDICINE

## 2018-06-19 PROCEDURE — 99213 OFFICE O/P EST LOW 20 MIN: CPT | Mod: PBBFAC,PO | Performed by: FAMILY MEDICINE

## 2018-06-19 RX ORDER — ESCITALOPRAM OXALATE 5 MG/1
5 TABLET ORAL DAILY
Qty: 30 TABLET | Refills: 11 | Status: SHIPPED | OUTPATIENT
Start: 2018-06-19 | End: 2018-12-11 | Stop reason: SDUPTHER

## 2018-06-19 NOTE — PATIENT INSTRUCTIONS
[facility] (phone dionne)    Nutrition: How to Make Healthier Food Choices     Nutrition: How to Make Healthier Food Choices     Why is healthy eating important?  When combined with exercise, a healthy diet can help you lose weight, lower your cholesterol level and improve the way your body functions on a daily basis.  The U.S. Department of Agricultures (USDA) Food Guide Pyramid divides food into 6 basic food groups, consisting of 1) grains, 2) fruits, 3) vegetables, 4) meats and beans, 5) dairy and 6) fats.  The USDA recommends an adult daily diet include the following:  3 ounces of whole grains, and 6 ounces of grains total   2 cups of fruit   2 1/2 cups of vegetables   3 cups fat-free or low-fat dairy   The following are some ways to make healthier food choices and to get the recommended amounts of whole grains, fruits, vegetables and dairy.    Grains  Whole-grain breads are low in fat; they're also high in fiber and complex carbohydrates, which help you feel lu longer and prevent overeating. Choose breads whose first ingredient says whole in front of the grain, for example, whole wheat flour or whole white flour; enriched or other types of flour have the important fiber and nutrients removed. Choose whole grain breads for sandwiches and as additions to meals.  Avoid rich bakery foods such as donuts, sweet rolls and muffins. These foods can contain more than 50% fat calories. Snacks such as maksim food cake and gingersnap cookies can satisfy your sweet tooth without adding fat to your diet.  Hot and cold cereals are usually low in fat. But instant cereals with cream may contain high-fat oils or butterfat. Granola cereals may also contain high-fat oils and extra sugars. Look for low-sugar options for both instant and granola cereals.  Avoid fried snacks such as potato chips and tortilla chips. Try the low-fat or baked versions instead.  Instead of this: Try this:   Croissants, biscuits, white breads and  "rolls Low-fat whole grain breads and rolls (wheat, rye and pumpernickel)   Doughnuts, pastries and scones English muffins and small whole grain bagels   Fried tortillas Soft tortillas (corn or whole wheat)   Sugar cereals and regular granola Oatmeal, low-fat granola and whole-grain cereal   Snack crackers Crackers (animal, tee, rye, soda, saltine, oyster)   Potato or corn chips and buttered popcorn Pretzels (unsalted) and popcorn (unbuttered)   White pasta Whole-wheat pasta   White rice Brown rice   Fried rice, or pasta and rice mixes that contain high-fat sauces Rice or pasta (without egg yolk) with vegetable sauces   All-purpose white flour 100% whole-wheat flour     Fruits and Vegetables  Fruits and vegetables are naturally low in fat. They add flavor and variety to your diet. They also contain fiber, vitamins and minerals.  Margarine, butter, mayonnaise and sour cream add fat to vegetables and fruits. Try using nonfat or low-fat versions of these foods. You can also use nonfat or low-fat yogurt or herbs as seasonings instead.  Instead of this: Try this:   Fried vegetables or vegetables served with cream, cheese or butter sauces All vegetables raw, steamed, broiled, baked or tossed with a very small amount of olive oil and salt and pepper   Coconut Fruit (fresh)   French fries, hash browns and potato   chips Baked white or sweet potatoes     Meat, Poultry and Fish  Beef, Pork, Veal and Lamb  Baking, broiling and roasting are the healthiest ways to prepare meat. Lean cuts can be pan-broiled or stir-fried. Use either a nonstick pan or nonstick spray coating instead of butter or margarine.  Trim outside fat before cooking. Trim any inside, separable fat before eating. Select low-fat, lean cuts of meat. Lean beef and veal cuts have the word "loin" or "round" in their names. Lean pork cuts have the word "loin" or "leg" in their names.  Use herbs, spices, fresh vegetables and nonfat marinades to season meat. Avoid " "high-fat sauces and gravies.  Poultry  Baking, broiling and roasting are the healthiest ways to prepare poultry. Skinless poultry can be pan-broiled or stir-fried. Use either a nonstick pan or nonstick spray coating instead of butter or margarine.  Remove skin and visible fat before cooking. Chicken breasts are a good choice because they are low in fat and high in protein. Use domestic goose and duck only once in a while because both are high in fat.  Fish  Poaching, steaming, baking and broiling are the healthiest ways to prepare fish. Fresh fish should have a clear color, a moist look, a clean smell and firm, springy flesh. If good-quality fresh fish isn't available, buy frozen fish.  Most seafood is high in healthy polyunsaturated fat. Omega-3 fatty acids are also found in some fatty fish, such as salmon and cold-water trout. They may help lower the risk of heart disease in some people.  Cross-over Foods  Dry beans, peas and lentils offer protein and fiber without the cholesterol and fat of meats. Once in a while, try substituting beans for meat in a favorite recipe, such as lasagna or chili.  TVP, or textured vegetable protein, is widely available in many foods. Vegetarian "hot dogs," "hamburger" and "chicken nuggets" are low-fat, cholesterol-free alternatives to meat.  Instead of this: Try this:   Regular or breaded fish sticks or cakes, fish canned in oil, seafood prepared with butter or served in high-fat sauce Fish (fresh, frozen, canned in water), low-fat fish sticks or cakes and shellfish (such as shrimp)   Prime and marbled cuts Select-grade lean beef (round, sirloin and loin)   Pork spare ribs and kessler Lean pork (tenderloin and loin chop) and turkey kessler   Regular ground beef Lean or extra-lean ground beef, ground chicken and turkey breast   Lunch meats such as pepperoni, salami, bologna and liverwurst Lean lunch meats such as turkey, chicken and ham   Regular hot dogs or sausage Fat-free hot dogs and " turkey dogs     Dairy  Choose skim milk or low-fat buttermilk. Substitute evaporated skim milk for cream in recipes for soups, sauces and coffee.  Try low-fat cheeses. Skim ricotta can replace cream cheese on a bagel or in a vegetable dip. Use part-skim cheeses in recipes. Use 1% cottage cheese for salads and cooking. String cheese is a low-fat, high-calcium snack option.  Plain nonfat yogurt can replace sour cream in many recipes. (To maintain texture, stir 1 tablespoon of cornstarch into each cup of yogurt that you use in cooking.) Try mixing frozen nonfat or low-fat yogurt with fruit for dessert.  Skim sherbet is an alternative to ice cream. Soft-serve and regular ice creams are also lower in fat than premium styles.  Instead of this: Try this:   Whole or 2% milk Non-fat or 1% milk   Evaporated milk Evaporated non-fat milk   Regular buttermilk Buttermilk made from non-fat (or 1%) milk   Yogurt made with whole milk Nonfat or low-fat yogurt   Regular cheese (examples: American, blue, Brie, cheddar, Steven and Parmesan) Low-fat cheese with less than 3 grams of fat per serving (example: natural cheese, processed cheese and nondairy cheese such as soy cheese)   Regular cottage cheese Low-fat, nonfat, and dry-curd cottage cheese with less than 2% fat   Regular cream cheese Low-fat cream cheese (no more than 3 grams of fat per ounce)   Regular ice cream Sorbet, sherbet and nonfat or low-fat ice cream (no more than 3 grams of fat per 1/2 cup serving)     Fats, Oils and Sweets  Eating too many high-fat foods not only adds excess calories (which can lead to obesity and weight gain), but can increase your risk factor for several diseases. Heart disease, diabetes, certain types of cancer and osteoarthritis have all been linked to diets too high in fat. If you consume too much saturated and trans fats, you are more likely to develop high cholesterol and coronary artery disease.  Sugar-sweetened drinks, such as fruit juice,  "fruit drinks, regular soft drinks, sports drinks, energy drinks, sweetened or flavored milk and sweetened iced tea can add lots of sugar and calories to your diet. But staying hydrated is important for good health. Substitute water, zero-calorie flavored water, non-fat or reduced-fat milk, unsweetened tea or diet soda for sweetened drinks. Talk with your family doctor or a dietitian if you have questions about your diet or healthy eating for your family.  Instead of this: Try this:   Cookies Fig bars, gingersnaps and molasses cookies   Shortening, butter or margarine Olive, soybean and canola oils   Regular mayonnaise Nonfat or light mayonnaise   Regular salad dressing Nonfat or light salad dressing   Using fat (including butter) to grease pan Nonstick cooking spray       What it Takes to Lose Weight     What it Takes to Lose Weight     What does it take to lose weight?  To lose weight, you have to eat fewer calories than your body uses. Calories are the amount of energy in the food you eat. Some foods have more calories than others. For example, foods that are high in fat and sugar are also high in calories. If you eat more calories than your body uses, the extra calories will be stored as body excess fat.  A pound of fat is about 3,500 calories. To lose 1 pound of fat in a week, you have to eat 3,500 fewer calories (that is 500 fewer calories a day), or you have to "burn off" an extra 3,500 calories. You can burn off calories by exercising or just by being more active. (Talk to your family doctor before you begin any type of exercise program. Your doctor can help you determine what kind of exercise program is right for you.)  The best way to lose weight and keep it off is to eat fewer calories and be active. If you cut 250 calories from your diet each day and exercise enough to burn off 250 calories, that adds up to 500 fewer calories in one day. If you do this for 7 days, you can lose 1 pound of fat in a " "week.  Many experts believe you should not try to lose more than 2 pounds per week. Losing more than 2 pounds in a week usually means that you are losing water weight and lean muscle mass instead of losing excess fat. If you do this, you will have less energy, and you will most likely gain the weight back.    How often should I eat?  Most people can eat 3 regular meals and 1 snack every day. The 3 meals should be about the same size and should be low in fat. Try to eat 1 to 2 cups of fruits and vegetables, 2 to 3 ounces of whole grains and 1 to 2 ounces of meat (or a meat alternative) at most meals.  Some people benefit more if they eat 5 to 6 smaller meals throughout the day, about 2 to 3 hours apart. For example, their first meal of the day might be a cup of low-fat or nonfat yogurt and a banana. Three hours later they might eat a simple deli sandwich with whole-grain bread and fat-free mayonnaise.  Eat breakfast and don't skip meals. While skipping meals may help you lose weight in the beginning, it fails in the long run. Skipping meals may make you feel too hungry later in the day, causing you to overeat at your next meal.  After about a month of eating a normal breakfast, lunch and a light dinner, your body will adjust.    What is so bad about high-fat foods?  Foods high in fat are usually high in calories, which could lead to unwanted weight gain. Consuming too much saturated and trans fats may increase LDL cholesterol ("bad" cholesterol) level, and increase your risk of heart disease. The USDA suggests that you eat no more than 20 grams of saturated fat, and that you limit the amount of trans fat to as close to 0 grams as possible. Click here for more information on reading nutrition labels.  It is important to remember that some fats can be beneficial to your overall health. "Good" fat, such as polyunsaturated and monounsaturated fats are found in fish, nuts, and low- or nonfat dairy products.    What are " "empty calories?  Some foods are referred to as "empty calories" because they add lots of calories to your diet without providing much nutritional benefit. An example is sugar-sweetened drinks, such as fruit juice, fruit drinks, regular soft drinks, sports drinks, energy drinks, sweetened or flavored milk and sweetened iced tea. These drinks can add lots of sugar and calories to your diet.  But staying hydrated is important for good health. To reduce the calories and sugar in your diet, substitute water, zero-calorie flavored water, non-fat or reduced-fat milk, unsweetened tea or diet soda for sweetened drinks. Talk with your family doctor or a dietitian if you have questions about your diet or healthy eating for your family.    Can I trust nutrition information I get from newspapers and magazines?  Nutrition tips and diet information from different sources often conflict with each other. You should always check with your doctor first. Also, keep in mind the following advice:  There is no "magic bullet" when it comes to nutrition. There isn't one single diet that works for every person. You need to find an eating plan that works for you.   Good nutrition doesn't come in a vitamin supplement. Only take a vitamin with your doctor's recommendation, as your body benefits the most when you eat healthy foods.   Eating all different kinds of foods is best for your body, so learn to try new foods.   Fad diets offer short-term changes, but good health comes from long-term effort and commitment.   Stories from people who have used a diet program or product, especially in commercials and "[employer] are advertisements. These people are usually paid to endorse what the ad is selling. Remember, regained weight or other problems that develop after someone has completed the diet program are never talked about in those ads.     Will diet drugs help?  Although diet drugs may help you lose weight at first, they usually don't help " you keep the weight off and may have damaging side effects. Most diet pills have not been tested by the Food and Drug Administration, which means you can't be sure if the drugs are safe. Taking drugs also does not help you learn how to change your eating and exercise habits. Making lasting changes in these habits is the way to lose weight and keep it off.

## 2018-06-19 NOTE — PROGRESS NOTES
(Portions of this note were dictated using voice recognition software and may contain dictation related errors in spelling/grammar/syntax not found on text review)    CC:   Chief Complaint   Patient presents with    Weight Gain    Anxiety    Annual Exam       HPI: 32 y.o. female Last visit in 2018, April.  Does have anxiety, on Lexapro.  Initially on 20 mg went down to 10 mg out of concern for weight gain.  She had seen me at that last visit after syncopal episode.  Normal echo, EKG .  Labs only significant for mildly depressed hemoglobin as below.  Seems like it could be related to vasovagal syndrome given situations described on last visit note, had recommended staying off all stimulants including Adipex which she has taken in the past (as this could also worsen anxiety)      Later saw Dr. Ribera on 05/17/2018 for concern about headache and elevated blood pressures intermittently for the last several days.  She was given propranolol to take as needed for headache and blood pressure elevation.  Also had constipation issues for which she is on Linzess prescribed by GI.    Blood pressures are currently well controlled.  However she is concerned about difficulty with weight loss.  She is trying to stay very active, walks a good bit at work, takes her baby out for walks frequently.  She tries to eat much healthier, on weight watchers.  She is frustrated by the fact that she has not noticed any weight loss.  She has the last couple of days gone down from 10 mg of Lexapro to 5 mg Lexapro.  She was wondering if this could Lexapro even at a lower dose have 10 mg was possibly contributing to her weight gain.  She was also concerned if she was retaining fluid.  She denies any chest pain or shortness of breath.  Anxiety feels like it is doing much better right now than it had in the past.      Wt Readings from Last 4 Encounters:   06/19/18 82.5 kg (181 lb 14.1 oz)   05/17/18 82.8 kg (182 lb 8.7 oz)   04/17/18 81.4 kg (179 lb  7.3 oz)   04/13/18 81.9 kg (180 lb 8.9 oz)         Past Medical History:   Diagnosis Date    Acne     Anxiety     Chronic constipation        No past surgical history on file.    Family History   Problem Relation Age of Onset    Hypertension Other     Cancer Neg Hx     Diabetes Neg Hx     Breast cancer Neg Hx     Colon cancer Neg Hx     Ovarian cancer Neg Hx     Cervical cancer Neg Hx     Endometrial cancer Neg Hx     Vaginal cancer Neg Hx        Social History     Social History    Marital status: Single     Spouse name: N/A    Number of children: N/A    Years of education: N/A     Occupational History     [employer]     Social History Main Topics    Smoking status: Never Smoker    Smokeless tobacco: Never Used    Alcohol use No    Drug use: No    Sexual activity: Yes     Partners: Male     Birth control/ protection: None     Other Topics Concern    Not on file     Social History Narrative    No narrative on file     Lab Results   Component Value Date    WBC 3.96 04/13/2018    HGB 11.8 (L) 04/13/2018    HCT 38.5 04/13/2018     (H) 04/13/2018    CHOL 183 04/13/2018    TRIG 99 04/13/2018    HDL 52 04/13/2018    ALT 43 04/13/2018    AST 36 04/13/2018     04/13/2018    K 3.7 04/13/2018     04/13/2018    CREATININE 0.8 04/13/2018    CALCIUM 9.4 04/13/2018    BUN 12 04/13/2018    CO2 27 04/13/2018    TSH 1.638 04/13/2018    INR 1.0 11/16/2016    HGBA1C 4.9 04/13/2018    LDLCALC 111.2 04/13/2018    GLU 87 04/13/2018             ROS:  GENERAL:  Above.  SKIN: No rashes, no itching.  HEAD:  Above  EYES: No visual changes  EARS: No ear pain or changes in hearing.  NOSE: No congestion or rhinorrhea.  MOUTH & THROAT: No hoarseness, change in voice, or sore throat.  NODES: Denies swollen glands.  CHEST: Denies RANDALL, cyanosis, wheezing, cough and sputum production.  CARDIOVASCULAR: Denies chest pain, PND, orthopnea.  ABDOMEN: chronic constipation  URINARY: No flank pain, dysuria or  hematuria.  PERIPHERAL VASCULAR: No claudication or cyanosis.  MUSCULOSKELETAL: No joint stiffness or swelling. Denies back pain.  NEUROLOGIC: No weakness or numbness.  PSYCHIATRIC:  Above    Vital signs reviewed  PE:   APPEARANCE: Well nourished, well developed, in no acute distress.    HEAD: Normocephalic, atraumatic.  EYES: PERRL. EOMI.   Conjunctivae noninjected.  EARS: TM's intact. Light reflex normal. No retraction or perforation  NOSE: Mucosa pink. Airway clear.  MOUTH & THROAT: No tonsillar enlargement. No pharyngeal erythema or exudate.   NECK: Supple with no cervical lymphadenopathy.  No thyromegaly. No carotid bruits  CHEST: Good inspiratory effort. Lungs clear to auscultation with no wheezes or crackles.  CARDIOVASCULAR: Normal S1, S2. No rubs, murmurs, or gallops.  ABDOMEN: Bowel sounds normal. Not distended. Soft. No tenderness or masses. No organomegaly.  EXTREMITIES: No edema, cyanosis, or clubbing.      IMPRESSION  1. Weight gain    2. Anxiety disorder, unspecified type    3. BMI 28.0-28.9,adult            PLAN  Reviewed notes from Dr. Ribera.  Reviewed her recent labs as above    I think it is reasonable to keep her Lexapro down at 5 mg especially if she feels like her anxiety is fairly stable.  We did discuss the weight gain is a potential side effect of SSRI medication but may not be the same amount for everybody.  We also discussed importance of calorie counting to evaluate intake, along with aerobic quality exercise to help with increased calorie expenditure.  She was advised on some calorie tracking apps that she might be able to use    If she finds still difficulty with weight loss despite downgrading her Lexapro, or she finds anxiety is much worse with the lower dose, we could try different medication altogether.  We could consider BuSpar initiation.  She does not have any significant depressive component to her symptoms and such I do not think med like Wellbutrin, albeit weight neutral, would  be effective for her.       RTC 2 mo or sooner as needed.     SCREENINGS  Pap Dr. Bolden (4/2018)    IMM  tdap 2016

## 2018-08-16 ENCOUNTER — TELEPHONE (OUTPATIENT)
Dept: GASTROENTEROLOGY | Facility: CLINIC | Age: 33
End: 2018-08-16

## 2018-09-11 ENCOUNTER — TELEPHONE (OUTPATIENT)
Dept: FAMILY MEDICINE | Facility: CLINIC | Age: 33
End: 2018-09-11

## 2018-09-11 NOTE — TELEPHONE ENCOUNTER
Patient is scheduled with Dr Ribera on Friday at 11:00am for bruising and pain in the back of her leg.  She is concerned with clotting due to the pain.  Is there any testing that you would like done prior to appt?

## 2018-09-11 NOTE — TELEPHONE ENCOUNTER
----- Message from Ailyn Tamez sent at 9/11/2018 10:14 AM CDT -----  Contact: self / [phone]  Patient is requesting a call back regarding, she says she may have a blood clot on the back of her left leg, it has a Bruise and it is very painful. Please advise    She said her next day off of work is this Friday.  She works at [employer].

## 2018-09-11 NOTE — TELEPHONE ENCOUNTER
Patient denies whole leg swelling and pain in entire leg.  Advised that patient could do heat application to local area.  However if patient begins to experience whole leg swelling or pain in the complete leg, to go to the ER.  Patient scheduled for Friday at 11:00am with Dr Ribera for evaluation. Patient verbalized understanding.

## 2018-09-11 NOTE — TELEPHONE ENCOUNTER
If the area of concern is just very focused bruising and pain right around the bruise, I would just advise some local heat application and follow up as scheduled at her appointment.  If there is all over swelling of the entire leg or pain throughout the entire leg, further testing may be needed.

## 2018-09-12 ENCOUNTER — OFFICE VISIT (OUTPATIENT)
Dept: FAMILY MEDICINE | Facility: CLINIC | Age: 33
End: 2018-09-12
Payer: COMMERCIAL

## 2018-09-12 VITALS
HEIGHT: 66 IN | SYSTOLIC BLOOD PRESSURE: 138 MMHG | BODY MASS INDEX: 29.51 KG/M2 | WEIGHT: 183.63 LBS | DIASTOLIC BLOOD PRESSURE: 90 MMHG | HEART RATE: 84 BPM | TEMPERATURE: 99 F | OXYGEN SATURATION: 97 %

## 2018-09-12 DIAGNOSIS — R03.0 ELEVATED BLOOD-PRESSURE READING WITHOUT DIAGNOSIS OF HYPERTENSION: ICD-10-CM

## 2018-09-12 DIAGNOSIS — M79.605 PAIN OF LEFT LOWER EXTREMITY: Primary | ICD-10-CM

## 2018-09-12 PROCEDURE — 99999 PR PBB SHADOW E&M-EST. PATIENT-LVL III: CPT | Mod: PBBFAC,,, | Performed by: FAMILY MEDICINE

## 2018-09-12 PROCEDURE — 3008F BODY MASS INDEX DOCD: CPT | Mod: CPTII,S$GLB,, | Performed by: FAMILY MEDICINE

## 2018-09-12 PROCEDURE — 99214 OFFICE O/P EST MOD 30 MIN: CPT | Mod: S$GLB,,, | Performed by: FAMILY MEDICINE

## 2018-09-12 RX ORDER — LISINOPRIL 5 MG/1
5 TABLET ORAL DAILY
Qty: 90 TABLET | Refills: 3 | Status: SHIPPED | OUTPATIENT
Start: 2018-09-12 | End: 2018-09-14

## 2018-09-12 NOTE — PROGRESS NOTES
HPI:  Doroteo Sun is a 33 y.o. year old female that  presents with complaint of pain of her left leg that started on wed of last week and it was coming and going but since Monday it has stayed and not left. Ibuprofen was helping but today it did not help at all. 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  Chief Complaint   Patient presents with    Pain     left leg x 1 week   .     HPI      Past Medical History:   Diagnosis Date    Acne     Anxiety     Chronic constipation      Social History     Socioeconomic History    Marital status: Single     Spouse name: Not on file    Number of children: Not on file    Years of education: Not on file    Highest education level: Not on file   Social Needs    Financial resource strain: Not on file    Food insecurity - worry: Not on file    Food insecurity - inability: Not on file    Transportation needs - medical: Not on file    Transportation needs - non-medical: Not on file   Occupational History     Employer: [employer]   Tobacco Use    Smoking status: Never Smoker    Smokeless tobacco: Never Used   Substance and Sexual Activity    Alcohol use: No     Alcohol/week: 0.0 oz    Drug use: No    Sexual activity: Yes     Partners: Male     Birth control/protection: None   Other Topics Concern    Not on file   Social History Narrative    Not on file     History reviewed. No pertinent surgical history.  Family History   Problem Relation Age of Onset    Hypertension Other     Cancer Neg Hx     Diabetes Neg Hx     Breast cancer Neg Hx     Colon cancer Neg Hx     Ovarian cancer Neg Hx     Cervical cancer Neg Hx     Endometrial cancer Neg Hx     Vaginal cancer Neg Hx            Review of Systems  General ROS: negative for chills, fever or weight loss  ENT ROS: negative for epistaxis, sore throat or rhinorrhea  Respiratory ROS: no cough, shortness of breath, or wheezing  Cardiovascular ROS: no chest pain or dyspnea on  "exertion  Gastrointestinal ROS: no abdominal pain, change in bowel habits, or black/ bloody stools  Extremity:  Left extremity with leg and foot pain    Physical Exam:  BP (!) 138/90 (BP Location: Right arm, Patient Position: Sitting, BP Method: Medium (Automatic))   Pulse 84   Temp 98.6 °F (37 °C) (Oral)   Ht 5' 6" (1.676 m)   Wt 83.3 kg (183 lb 10.3 oz)   LMP 09/04/2018   SpO2 97%   BMI 29.64 kg/m²   General appearance: alert, cooperative, no distress  Constitutional:Oriented to person, place, and time.appears well-developed and well-nourished.  HEENT: Normocephalic, atraumatic, neck symmetrical, no nasal discharge, TM- clear bilaterally  Lungs: clear to auscultation bilaterally, no dullness to percussion bilaterally  Heart: regular rate and rhythm without rub; no displacement of the PMI , S1&S2 present  Abdomen: soft, non-tender; bowel sounds normoactive; no organomegaly  Extremity:  Free range of motion no edema or skin changes of left lower extremity, strength 5/5 lower extremity, no cords palpated along calf positive tenderness to palpation lateral calf  Physical Exam    LABS:    Complete Blood Count  Lab Results   Component Value Date    RBC 4.65 04/13/2018    HGB 11.8 (L) 04/13/2018    HCT 38.5 04/13/2018    MCV 83 04/13/2018    MCH 25.4 (L) 04/13/2018    MCHC 30.6 (L) 04/13/2018    RDW 14.5 04/13/2018     (H) 04/13/2018    MPV 9.8 04/13/2018    GRAN 2.0 04/13/2018    GRAN 49.7 04/13/2018    LYMPH 1.5 04/13/2018    LYMPH 36.9 04/13/2018    MONO 0.4 04/13/2018    MONO 10.1 04/13/2018    EOS 0.1 04/13/2018    BASO 0.02 04/13/2018    EOSINOPHIL 2.3 04/13/2018    BASOPHIL 0.5 04/13/2018    DIFFMETHOD Automated 04/13/2018       Comprehensive Metabolic Panel  Lab Results   Component Value Date    GLU 87 04/13/2018    BUN 12 04/13/2018    CREATININE 0.8 04/13/2018     04/13/2018    K 3.7 04/13/2018     04/13/2018    PROT 8.1 04/13/2018    ALBUMIN 3.7 04/13/2018    BILITOT 0.5 04/13/2018    " AST 36 04/13/2018    ALKPHOS 74 04/13/2018    CO2 27 04/13/2018    ALT 43 04/13/2018    ANIONGAP 9 04/13/2018    EGFRNONAA >60.0 04/13/2018    ESTGFRAFRICA >60.0 04/13/2018       LIPID  Lab Results   Component Value Date    CHOL 183 04/13/2018    HDL 52 04/13/2018         TSH  Lab Results   Component Value Date    TSH 1.638 04/13/2018       Current Outpatient Medications   Medication Sig Dispense Refill    escitalopram oxalate (LEXAPRO) 5 MG Tab Take 1 tablet (5 mg total) by mouth once daily. 30 tablet 11    linaclotide (LINZESS) 290 mcg Cap Take 1 capsule (290 mcg total) by mouth once daily. 90 capsule 1    propranolol (INDERAL) 10 MG tablet Take 1 tablet (10 mg total) by mouth 2 (two) times daily. 60 tablet 0    triamcinolone acetonide 0.1% (KENALOG) 0.1 % cream       ibuprofen (ADVIL,MOTRIN) 800 MG tablet Take 1 tablet (800 mg total) by mouth 3 (three) times daily as needed for Pain. 60 tablet 2     No current facility-administered medications for this visit.        Assessment:    ICD-10-CM ICD-9-CM    1. Pain of left lower extremity M79.605 729.5  US Lower Extremity Veins Left   2. Elevated blood-pressure reading without diagnosis of hypertension R03.0 796.2  lisinopril (PRINIVIL,ZESTRIL) 5 MG tablet         Plan:    Follow-up in 1 week (on 9/19/2018) for PCP.          Kandy Sow MD

## 2018-09-14 ENCOUNTER — OFFICE VISIT (OUTPATIENT)
Dept: FAMILY MEDICINE | Facility: CLINIC | Age: 33
End: 2018-09-14
Attending: FAMILY MEDICINE
Payer: COMMERCIAL

## 2018-09-14 VITALS
BODY MASS INDEX: 29.26 KG/M2 | OXYGEN SATURATION: 99 % | DIASTOLIC BLOOD PRESSURE: 86 MMHG | TEMPERATURE: 98 F | WEIGHT: 182.06 LBS | HEIGHT: 66 IN | HEART RATE: 101 BPM | SYSTOLIC BLOOD PRESSURE: 124 MMHG

## 2018-09-14 DIAGNOSIS — S86.912A MUSCLE STRAIN OF LEFT LOWER LEG, INITIAL ENCOUNTER: ICD-10-CM

## 2018-09-14 DIAGNOSIS — M79.605 LEFT LEG PAIN: Primary | ICD-10-CM

## 2018-09-14 PROCEDURE — 3008F BODY MASS INDEX DOCD: CPT | Mod: CPTII,S$GLB,, | Performed by: FAMILY MEDICINE

## 2018-09-14 PROCEDURE — 99214 OFFICE O/P EST MOD 30 MIN: CPT | Mod: S$GLB,,, | Performed by: FAMILY MEDICINE

## 2018-09-14 PROCEDURE — 99999 PR PBB SHADOW E&M-EST. PATIENT-LVL III: CPT | Mod: PBBFAC,,, | Performed by: FAMILY MEDICINE

## 2018-09-14 RX ORDER — IBUPROFEN 800 MG/1
800 TABLET ORAL 3 TIMES DAILY PRN
Qty: 60 TABLET | Refills: 2 | Status: SHIPPED | OUTPATIENT
Start: 2018-09-14 | End: 2018-11-30 | Stop reason: SDUPTHER

## 2018-09-14 NOTE — PROGRESS NOTES
Subjective:       Patient ID: Doroteo Sun is a 33 y.o. female.    Chief Complaint: Leg Pain (ongoing for 1 wk, declines flu)    33 yr old pleasant black female with chronic constipation, intermittent palpitations, presents today for evaluation of left leg/thigh pain. Onset 2 weeks ago and already seen at urgent care. She stands on her feet a lot at work. No recent travel/surgery. It hurts mostly at night and sometimes wakes her up with severe pain. She takes ibuprofen and it helps for the most part. She had rash 2 days ago which appeared to be ruptured blood vessel and it is cleared now. She is not on OCP.      Details as follows -      History as below - reviewed       Leg Pain    The incident occurred more than 1 week ago. There was no injury mechanism. The pain is present in the left leg and left thigh. The quality of the pain is described as aching. The pain is at a severity of 5/10. The pain is moderate. The symptoms are aggravated by movement and weight bearing. She has tried NSAIDs, elevation, heat and ice for the symptoms. The treatment provided moderate relief.     Review of Systems   Constitutional: Negative.  Negative for activity change, diaphoresis and unexpected weight change.   HENT: Negative.  Negative for congestion, ear discharge, hearing loss, rhinorrhea, sore throat and voice change.    Eyes: Negative.  Negative for pain, discharge and visual disturbance.   Respiratory: Negative.  Negative for chest tightness, shortness of breath and wheezing.    Cardiovascular: Negative.  Negative for chest pain.   Gastrointestinal: Negative.  Negative for abdominal distention, anal bleeding, constipation and nausea.   Endocrine: Negative.  Negative for cold intolerance, polydipsia and polyuria.   Genitourinary: Negative.  Negative for decreased urine volume, difficulty urinating, dysuria, frequency, menstrual problem and vaginal pain.   Musculoskeletal: Positive for myalgias. Negative for arthralgias and  gait problem.   Skin: Negative.  Negative for color change, pallor and wound.   Allergic/Immunologic: Negative.  Negative for environmental allergies and immunocompromised state.   Neurological: Negative.  Negative for dizziness, tremors, seizures, speech difficulty and headaches.   Hematological: Negative.  Negative for adenopathy. Does not bruise/bleed easily.   Psychiatric/Behavioral: Negative.  Negative for agitation, confusion, decreased concentration, hallucinations, self-injury and suicidal ideas. The patient is not nervous/anxious.        PMH/PSH/FH/SH/MED/ALLERGY reviewed    Objective:       Vitals:    09/14/18 1112   BP: 124/86   Pulse: 101   Temp: 98.2 °F (36.8 °C)       Physical Exam   Musculoskeletal:        Left upper leg: She exhibits tenderness.        Legs:      Assessment:       1. Left leg pain    2. Muscle strain of left lower leg, initial encounter        Plan:       Doroteo was seen today for leg pain.    Diagnoses and all orders for this visit:    Left leg pain  -     US Lower Extremity Veins Left; Future  -     ibuprofen (ADVIL,MOTRIN) 800 MG tablet; Take 1 tablet (800 mg total) by mouth 3 (three) times daily as needed for Pain.    Muscle strain of left lower leg, initial encounter  -     ibuprofen (ADVIL,MOTRIN) 800 MG tablet; Take 1 tablet (800 mg total) by mouth 3 (three) times daily as needed for Pain.      Left leg pain/muscle strain  -r/o DVT - low suspicion  -USG lower extremity  -ER precautions given  -heat/ice and rest  -NSAIDS prn pain    Spent adequate time in obtaining history and explaining differentials      40 minutes spent during this visit of which greater than 50% devoted to face-face counseling and coordination of care regarding diagnosis and management plan    Follow-up if symptoms worsen or fail to improve.

## 2018-09-19 ENCOUNTER — HOSPITAL ENCOUNTER (OUTPATIENT)
Dept: RADIOLOGY | Facility: HOSPITAL | Age: 33
Discharge: HOME | End: 2018-09-19
Attending: FAMILY MEDICINE
Payer: COMMERCIAL

## 2018-09-19 DIAGNOSIS — M79.605 LEFT LEG PAIN: ICD-10-CM

## 2018-09-19 PROCEDURE — 93971 EXTREMITY STUDY: CPT | Mod: TC

## 2018-09-19 PROCEDURE — 93971 EXTREMITY STUDY: CPT | Mod: 26,,, | Performed by: RADIOLOGY

## 2018-09-20 ENCOUNTER — TELEPHONE (OUTPATIENT)
Dept: FAMILY MEDICINE | Facility: CLINIC | Age: 33
End: 2018-09-20

## 2018-09-20 ENCOUNTER — PATIENT MESSAGE (OUTPATIENT)
Dept: FAMILY MEDICINE | Facility: CLINIC | Age: 33
End: 2018-09-20

## 2018-09-20 RX ORDER — GABAPENTIN 100 MG/1
100 CAPSULE ORAL NIGHTLY
Qty: 30 CAPSULE | Refills: 2 | Status: SHIPPED | OUTPATIENT
Start: 2018-09-20 | End: 2019-09-19 | Stop reason: SDUPTHER

## 2018-09-20 NOTE — TELEPHONE ENCOUNTER
Spoke to pt and states that both of her legs are burning and tight. Pt is unable to walk on them. Legs are not swollen. Pls advise.

## 2018-09-20 NOTE — TELEPHONE ENCOUNTER
----- Message from Demetrio Sow sent at 9/20/2018 11:29 AM CDT -----  Contact: self/[phone]  Patient called to speak with you about pain that has spread and the ibuprofen is not working.    Please call and advise.

## 2018-09-20 NOTE — TELEPHONE ENCOUNTER
Informed pt she can try Gabapentin 100 mg nightly and see if that helps. Dr. Ribera sent in 30 pills with 2 refills. Pt verbalized understanding.

## 2018-10-22 ENCOUNTER — TELEPHONE (OUTPATIENT)
Dept: GASTROENTEROLOGY | Facility: CLINIC | Age: 33
End: 2018-10-22

## 2018-10-22 NOTE — TELEPHONE ENCOUNTER
----- Message from Roberta Sanchez sent at 10/22/2018  8:13 AM CDT -----  Contact: [phone]/ self   Pt its requesting an appointment for this Friday . Please advise

## 2018-10-22 NOTE — TELEPHONE ENCOUNTER
Spoke with pt and was able to schedule her for a f/u with Parul on 10-26-18 at 7:40am. Verbal Understanding.

## 2018-10-26 ENCOUNTER — OFFICE VISIT (OUTPATIENT)
Dept: GASTROENTEROLOGY | Facility: CLINIC | Age: 33
End: 2018-10-26
Payer: COMMERCIAL

## 2018-10-26 VITALS
DIASTOLIC BLOOD PRESSURE: 85 MMHG | BODY MASS INDEX: 28.77 KG/M2 | HEIGHT: 66 IN | SYSTOLIC BLOOD PRESSURE: 129 MMHG | WEIGHT: 179 LBS

## 2018-10-26 DIAGNOSIS — K59.04 CHRONIC IDIOPATHIC CONSTIPATION: Primary | ICD-10-CM

## 2018-10-26 PROCEDURE — 99999 PR PBB SHADOW E&M-EST. PATIENT-LVL II: CPT | Mod: PBBFAC,,, | Performed by: NURSE PRACTITIONER

## 2018-10-26 PROCEDURE — 3008F BODY MASS INDEX DOCD: CPT | Mod: CPTII,S$GLB,, | Performed by: NURSE PRACTITIONER

## 2018-10-26 PROCEDURE — 99213 OFFICE O/P EST LOW 20 MIN: CPT | Mod: S$GLB,,, | Performed by: NURSE PRACTITIONER

## 2018-10-26 RX ORDER — POLYETHYLENE GLYCOL 3350, SODIUM SULFATE ANHYDROUS, SODIUM BICARBONATE, SODIUM CHLORIDE, POTASSIUM CHLORIDE 236; 22.74; 6.74; 5.86; 2.97 G/4L; G/4L; G/4L; G/4L; G/4L
4 POWDER, FOR SOLUTION ORAL ONCE
Qty: 4000 ML | Refills: 0 | Status: SHIPPED | OUTPATIENT
Start: 2018-10-26 | End: 2018-10-26

## 2018-10-26 NOTE — PROGRESS NOTES
Subjective:       Patient ID: Doroteo Sun is a 33 y.o. female.    Chief Complaint: Follow-up and Constipation (Last BM 5 days ago)    HPI  Has had chronic constipation that has previously been well controlled with Linzess 290mcg.  However, over the last 1-2 weeks she has had minimal results.  She reports no bowel movement in 5 days and prior to this a very small hard stool.   Denies blood with bowel movement.  She has abdominal fullness and discomfort associated with her constipation.  Denies new medications.  Has been attempting diet for weight loss and eating smaller portions.  She eats fiber, exercises and drinks water.   Denies family history of colon cancer or colon polyps.      Review of Systems   Constitutional: Negative for activity change, appetite change, fatigue, fever and unexpected weight change.   Respiratory: Negative for shortness of breath.    Cardiovascular: Negative for chest pain.   Gastrointestinal: Positive for abdominal distention and constipation. Negative for blood in stool, diarrhea, nausea and vomiting.   Genitourinary: Negative.    Neurological: Negative.    Psychiatric/Behavioral: Negative.        Objective:      Physical Exam   Constitutional: She is oriented to person, place, and time. She appears well-developed and well-nourished. No distress.   Eyes: No scleral icterus.   Cardiovascular: Normal rate.   Pulmonary/Chest: Effort normal. No respiratory distress.   Abdominal: Soft. Bowel sounds are normal. She exhibits no distension and no mass. There is no tenderness. There is no guarding.   Neurological: She is alert and oriented to person, place, and time.   Skin: Skin is warm and dry. She is not diaphoretic.   Psychiatric: She has a normal mood and affect. Her behavior is normal. Judgment and thought content normal.   Vitals reviewed.      Assessment:       1. Chronic idiopathic constipation        Plan:     Doroteo was seen today for follow-up and constipation.    Diagnoses and  all orders for this visit:    Chronic idiopathic constipation    Other orders  -     polyethylene glycol (GOLYTELY) 236-22.74-6.74 -5.86 gram suspension; Take 4,000 mLs (4 L total) by mouth once. for 1 dose         -   High fiber diet  -   Fiber supplement  -   Garland fluid intake   -   Exercise    Can use golytely today and then resume Linzess.    If this is not effective, can consider trial of amitiza or trulance.

## 2018-10-30 ENCOUNTER — TELEPHONE (OUTPATIENT)
Dept: GASTROENTEROLOGY | Facility: CLINIC | Age: 33
End: 2018-10-30

## 2018-10-30 ENCOUNTER — PATIENT MESSAGE (OUTPATIENT)
Dept: GASTROENTEROLOGY | Facility: CLINIC | Age: 33
End: 2018-10-30

## 2018-10-30 DIAGNOSIS — K59.04 CHRONIC IDIOPATHIC CONSTIPATION: Primary | ICD-10-CM

## 2018-10-30 RX ORDER — LUBIPROSTONE 24 UG/1
24 CAPSULE ORAL 2 TIMES DAILY WITH MEALS
Qty: 60 CAPSULE | Refills: 6 | Status: SHIPPED | OUTPATIENT
Start: 2018-10-30 | End: 2019-02-06

## 2018-10-30 NOTE — TELEPHONE ENCOUNTER
----- Message from Danielito Kahn PharmD sent at 10/30/2018  2:39 PM CDT -----  Pt was prescribed amitiza. In order for insurance to approve pt needs to try  linzess ( which she has )  And movantik ( which she has not ) does md want to try movantik so we can then try and get amitiza approved. If so please send over new rx for movantik. Thank you

## 2018-11-01 RX ORDER — LACTULOSE 10 G/15ML
30 SOLUTION ORAL 3 TIMES DAILY
Qty: 4050 ML | Refills: 0 | Status: SHIPPED | OUTPATIENT
Start: 2018-11-01 | End: 2018-12-01

## 2018-11-05 ENCOUNTER — TELEPHONE (OUTPATIENT)
Dept: PHARMACY | Facility: CLINIC | Age: 33
End: 2018-11-05

## 2018-11-05 NOTE — TELEPHONE ENCOUNTER
Good Morning.    The prior authorization for Ms. Sun's Amitiza prescription has been approved through 11/1/2019.    The patient has been notified and is aware of the medication approval.    Thanks.    Evelina Alvarez CPhT.  Patient Care Advocate   [facility]  [email or web address]

## 2018-11-07 ENCOUNTER — OFFICE VISIT (OUTPATIENT)
Dept: DERMATOLOGY | Facility: CLINIC | Age: 33
End: 2018-11-07
Payer: COMMERCIAL

## 2018-11-07 ENCOUNTER — PATIENT MESSAGE (OUTPATIENT)
Dept: DERMATOLOGY | Facility: CLINIC | Age: 33
End: 2018-11-07

## 2018-11-07 DIAGNOSIS — L70.0 ACNE VULGARIS: Primary | ICD-10-CM

## 2018-11-07 PROCEDURE — 99202 OFFICE O/P NEW SF 15 MIN: CPT | Mod: S$GLB,,, | Performed by: DERMATOLOGY

## 2018-11-07 PROCEDURE — 99999 PR PBB SHADOW E&M-EST. PATIENT-LVL II: CPT | Mod: PBBFAC,,, | Performed by: DERMATOLOGY

## 2018-11-07 RX ORDER — PHENTERMINE HYDROCHLORIDE 37.5 MG/1
37.5 TABLET ORAL DAILY
Refills: 0 | COMMUNITY
Start: 2018-09-12 | End: 2018-11-23

## 2018-11-07 RX ORDER — TAZAROTENE 1 MG/G
GEL TOPICAL NIGHTLY
Qty: 100 G | Refills: 2 | Status: SHIPPED | OUTPATIENT
Start: 2018-11-07 | End: 2020-06-05

## 2018-11-07 NOTE — PROGRESS NOTES
Subjective:       Patient ID:  Doroteo Sun is a 33 y.o. female who presents for   Chief Complaint   Patient presents with    Rash     face & forehead     Pt presents today for a rash on the face and forehead for 1-2 weeks, itchy, completed 6 months of Accutane in June, uses white dove soap and vanicream lotion.    In past prior to Accutane had used tretinoin and Tazorac, stopped when she became pregnant.      Review of Systems   Skin: Positive for daily sunscreen use and activity-related sunscreen use. Negative for recent sunburn and wears hat.   Hematologic/Lymphatic: Bruises/bleeds easily.        Objective:    Physical Exam   Constitutional: She appears well-developed and well-nourished. No distress.   Neurological: She is alert and oriented to person, place, and time. She is not disoriented.   Psychiatric: She has a normal mood and affect.   Skin:   Areas Examined (abnormalities noted in diagram):   Head / Face Inspection Performed  Neck Inspection Performed  RUE Inspected  LUE Inspection Performed              Diagram Legend     Erythematous scaling macule/papule c/w actinic keratosis       Vascular papule c/w angioma      Pigmented verrucoid papule/plaque c/w seborrheic keratosis      Yellow umbilicated papule c/w sebaceous hyperplasia      Irregularly shaped tan macule c/w lentigo     1-2 mm smooth white papules consistent with Milia      Movable subcutaneous cyst with punctum c/w epidermal inclusion cyst      Subcutaneous movable cyst c/w pilar cyst      Firm pink to brown papule c/w dermatofibroma      Pedunculated fleshy papule(s) c/w skin tag(s)      Evenly pigmented macule c/w junctional nevus     Mildly variegated pigmented, slightly irregular-bordered macule c/w mildly atypical nevus      Flesh colored to evenly pigmented papule c/w intradermal nevus       Pink pearly papule/plaque c/w basal cell carcinoma      Erythematous hyperkeratotic cursted plaque c/w SCC      Surgical scar with no sign of  skin cancer recurrence      Open and closed comedones      Inflammatory papules and pustules      Verrucoid papule consistent consistent with wart     Erythematous eczematous patches and plaques     Dystrophic onycholytic nail with subungual debris c/w onychomycosis     Umbilicated papule    Erythematous-base heme-crusted tan verrucoid plaque consistent with inflamed seborrheic keratosis     Erythematous Silvery Scaling Plaque c/w Psoriasis     See annotation      Assessment / Plan:        Acne vulgaris - comedonal only -   Continue gentle facewash BID and spf 30  Topical retinoid nightly  Pt aware may cause irritation initially and should not be used during pregnancy  -     tazarotene (TAZORAC) 0.1 % Gel gel; Apply topically nightly. To face for acne  Dispense: 100 g; Refill: 2             Follow-up in about 3 months (around 2/7/2019) for with Derm Nurse Practitioner (GP).

## 2018-11-12 ENCOUNTER — OFFICE VISIT (OUTPATIENT)
Dept: INTERNAL MEDICINE | Facility: CLINIC | Age: 33
End: 2018-11-12
Payer: COMMERCIAL

## 2018-11-12 VITALS
DIASTOLIC BLOOD PRESSURE: 86 MMHG | SYSTOLIC BLOOD PRESSURE: 120 MMHG | HEART RATE: 105 BPM | BODY MASS INDEX: 29.12 KG/M2 | TEMPERATURE: 98 F | HEIGHT: 66 IN | WEIGHT: 181.19 LBS | OXYGEN SATURATION: 96 %

## 2018-11-12 DIAGNOSIS — N64.3 GALACTORRHEA: ICD-10-CM

## 2018-11-12 DIAGNOSIS — J01.20 ACUTE NON-RECURRENT ETHMOIDAL SINUSITIS: Primary | ICD-10-CM

## 2018-11-12 DIAGNOSIS — R05.9 COUGH: ICD-10-CM

## 2018-11-12 DIAGNOSIS — R09.82 POSTNASAL DRIP: ICD-10-CM

## 2018-11-12 PROCEDURE — 99999 PR PBB SHADOW E&M-EST. PATIENT-LVL III: CPT | Mod: PBBFAC,,, | Performed by: INTERNAL MEDICINE

## 2018-11-12 PROCEDURE — 3008F BODY MASS INDEX DOCD: CPT | Mod: CPTII,S$GLB,, | Performed by: INTERNAL MEDICINE

## 2018-11-12 PROCEDURE — 99214 OFFICE O/P EST MOD 30 MIN: CPT | Mod: S$GLB,,, | Performed by: INTERNAL MEDICINE

## 2018-11-12 RX ORDER — PROMETHAZINE HYDROCHLORIDE AND DEXTROMETHORPHAN HYDROBROMIDE 6.25; 15 MG/5ML; MG/5ML
5 SYRUP ORAL EVERY 6 HOURS PRN
Qty: 118 ML | Refills: 0 | Status: SHIPPED | OUTPATIENT
Start: 2018-11-12 | End: 2018-11-23

## 2018-11-12 RX ORDER — FLUTICASONE PROPIONATE 50 MCG
2 SPRAY, SUSPENSION (ML) NASAL DAILY
Qty: 16 G | Refills: 0 | Status: SHIPPED | OUTPATIENT
Start: 2018-11-12 | End: 2018-12-12

## 2018-11-12 RX ORDER — AMOXICILLIN AND CLAVULANATE POTASSIUM 875; 125 MG/1; MG/1
1 TABLET, FILM COATED ORAL EVERY 12 HOURS
Qty: 14 TABLET | Refills: 0 | Status: SHIPPED | OUTPATIENT
Start: 2018-11-12 | End: 2018-11-19

## 2018-11-12 NOTE — PATIENT INSTRUCTIONS
Amoxicillin; Clavulanic Acid tablets  What is this medicine?  AMOXICILLIN; CLAVULANIC ACID (a mox i BLU in; PARDEEP jolene shaheen ic AS id) is a penicillin antibiotic. It is used to treat certain kinds of bacterial infections. It will not work for colds, flu, or other viral infections.  How should I use this medicine?  Take this medicine by mouth with a full glass of water. Follow the directions on the prescription label. Take at the start of a meal. Do not crush or chew. If the tablet has a score line, you may cut it in half at the score line for easier swallowing. Take your medicine at regular intervals. Do not take your medicine more often than directed. Take all of your medicine as directed even if you think you are better. Do not skip doses or stop your medicine early.  Talk to your pediatrician regarding the use of this medicine in children. Special care may be needed.  What side effects may I notice from receiving this medicine?  Side effects that you should report to your doctor or health care professional as soon as possible:  · allergic reactions like skin rash, itching or hives, swelling of the face, lips, or tongue  · breathing problems  · dark urine  · fever or chills, sore throat  · redness, blistering, peeling or loosening of the skin, including inside the mouth  · seizures  · trouble passing urine or change in the amount of urine  · unusual bleeding, bruising  · unusually weak or tired  · white patches or sores in the mouth or throat  Side effects that usually do not require medical attention (report to your doctor or health care professional if they continue or are bothersome):  · diarrhea  · dizziness  · headache  · nausea, vomiting  · stomach upset  · vaginal or anal irritation  What may interact with this medicine?  · allopurinol  · anticoagulants  · birth control pills  · methotrexate  · probenecid  What if I miss a dose?  If you miss a dose, take it as soon as you can. If it is almost time for your  next dose, take only that dose. Do not take double or extra doses.  Where should I keep my medicine?  Keep out of the reach of children.  Store at room temperature below 25 degrees C (77 degrees F). Keep container tightly closed. Throw away any unused medicine after the expiration date.  What should I tell my health care provider before I take this medicine?  They need to know if you have any of these conditions:  · bowel disease, like colitis  · kidney disease  · liver disease  · mononucleosis  · an unusual or allergic reaction to amoxicillin, penicillin, cephalosporin, other antibiotics, clavulanic acid, other medicines, foods, dyes, or preservatives  · pregnant or trying to get pregnant  · breast-feeding  What should I watch for while using this medicine?  Tell your doctor or health care professional if your symptoms do not improve.  Do not treat diarrhea with over the counter products. Contact your doctor if you have diarrhea that lasts more than 2 days or if it is severe and watery.  If you have diabetes, you may get a false-positive result for sugar in your urine. Check with your doctor or health care professional.  Birth control pills may not work properly while you are taking this medicine. Talk to your doctor about using an extra method of birth control.  NOTE:This sheet is a summary. It may not cover all possible information. If you have questions about this medicine, talk to your doctor, pharmacist, or health care provider. Copyright© 2017 [employer]        Acute Sinusitis    Acute sinusitis is irritation and swelling of the sinuses. It is usually caused by a viral infection after a common cold. Your doctor can help you find relief.  What is acute sinusitis?  Sinuses are air-filled spaces in the skull behind the face. They are kept moist and clean by a lining of mucosa. Things such as pollen, smoke, and chemical fumes can irritate the mucosa. It can then swell up. As a response to irritation, the  mucosa makes more mucus and other fluids. Tiny hairlike cilia cover the mucosa. Cilia help carry mucus toward the opening of the sinus. Too much mucus may cause the cilia to stop working. This blocks the sinus opening. A buildup of fluid in the sinuses then causes pain and pressure. It can also encourage bacteria to grow in the sinuses.  Common symptoms of acute sinusitis  You may have:  · Facial soreness pain  · Headache  · Fever  · Fluid draining in the back of the throat (postnasal drip)  · Congestion  · Drainage that is thick and colored, instead of clear  · Cough  Diagnosing acute sinusitis  Your doctor will ask about your symptoms and health history. He or she will look at your ear, nose, and throat. You usually won't need to have X-rays taken.    The doctor may take a sample of mucus to check for bacteria. If you have sinusitis that keeps coming back, you may need imaging tests such as X-rays or CAT scans. This will help your doctor check for a structural problem that may be causing the infection.  Treating acute sinusitis  Treatment is aimed at unblocking the sinus opening and helping the cilia work again. You may need to take antihistamine and decongestant medicine. These can reduce inflammation and decrease the amount of fluid your sinuses make. If you have a bacterial infection, you will need to take antibiotic medicine for 10 to 14 days. Take this medicine until it is gone, even if you feel better.  Date Last Reviewed: 10/1/2016  © 9280-6633 [employer]. [address] [place] [address], [place] [address]. All rights reserved. This information is not intended as a substitute for professional medical care. Always follow your healthcare professional's instructions.

## 2018-11-12 NOTE — PROGRESS NOTES
Subjective:       Patient ID: Doroteo Sun is a 33 y.o. female.    Chief Complaint: Nasal Congestion (x5d); Sore Throat (x5d); Cough (x5d); and Headache    HPI Mrs. Sun is a 33-year-old female who presents with a chief complaint of congestion. Patient states she began feeling ill 5 days ago.  It started with a sore throat.  It then progressed to a runny nose, nasal congestion, with associated cough, headache, and runny eyes.  Cough is productive of phlegm.  Symptoms are constant and worsening.  She has tried DayQuil cold and flu, Zyrtec, ibuprofen with no relief of her symptoms.  Nothing helps her symptoms.  She denies any associated fever.  Of note, her son, age 2 years, had RSV 2 weeks ago.  Patient also concerned about discharge from the breast.  She reports that she squeezed her breast this morning and a discharge was produced.  She brought a picture showing a white substance being excreted from the nipple.  This has not happened to her since her son was born 2 years ago.    Review of Systems   Constitutional: Negative for fever.   HENT: Positive for congestion, ear pain, postnasal drip, rhinorrhea, sinus pressure, sinus pain and sore throat.    Respiratory: Positive for cough.    Endocrine:        White discharge from nipple       Objective:      Physical Exam   Constitutional: She is oriented to person, place, and time. She appears well-developed and well-nourished. No distress.   HENT:   Head: Normocephalic and atraumatic.       Right Ear: External ear normal.   Left Ear: External ear normal.   Nose: Mucosal edema and rhinorrhea present.   Mouth/Throat: Oropharynx is clear and moist.   Erythematous canals.  Fluid behind TMs bilaterally.    Area of pressure and pain as highlighted on the diagram.   Eyes: Conjunctivae and EOM are normal.   Neck: Neck supple. No tracheal deviation present. No thyromegaly present.   Cardiovascular: Normal rate, regular rhythm, normal heart sounds and intact distal pulses.  Exam reveals no gallop and no friction rub.   No murmur heard.  Pulmonary/Chest: Effort normal and breath sounds normal. No stridor. No respiratory distress. She has no wheezes. She has no rales.   Lymphadenopathy:     She has cervical adenopathy (2 small LNs palpated, in left submandibular region).   Neurological: She is alert and oriented to person, place, and time.   Skin: Skin is warm and dry. She is not diaphoretic.   Psychiatric: She has a normal mood and affect. Her behavior is normal. Judgment and thought content normal.   Vitals reviewed.      Assessment:       1. Acute non-recurrent ethmoidal sinusitis    2. Cough    3. Postnasal drip    4. Galactorrhea        Plan:     1.  Acute non recurrent sinusitis  Augmentin 875/125 mg p.o. b.i.d. x7 days  Flonase nasal spray prescribed    2.  Cough  Secondary to postnasal drip from sinusitis  Promethazine-dm cough syrup 5 mL p.o. q.6 hours p.r.n. cough    3.  Postnasal drip  Due to sinusitis  Flonase prescribed    4.  Galactorrhea  Recommend follow-up with her OBGYN.    RTC PRN

## 2018-11-13 ENCOUNTER — TELEPHONE (OUTPATIENT)
Dept: PHARMACY | Facility: CLINIC | Age: 33
End: 2018-11-13

## 2018-11-23 ENCOUNTER — OFFICE VISIT (OUTPATIENT)
Dept: OBSTETRICS AND GYNECOLOGY | Facility: CLINIC | Age: 33
End: 2018-11-23
Payer: COMMERCIAL

## 2018-11-23 ENCOUNTER — PATIENT MESSAGE (OUTPATIENT)
Dept: OBSTETRICS AND GYNECOLOGY | Facility: CLINIC | Age: 33
End: 2018-11-23

## 2018-11-23 VITALS
WEIGHT: 181.44 LBS | SYSTOLIC BLOOD PRESSURE: 114 MMHG | HEIGHT: 66 IN | BODY MASS INDEX: 29.16 KG/M2 | DIASTOLIC BLOOD PRESSURE: 68 MMHG

## 2018-11-23 DIAGNOSIS — N89.8 VAGINA ITCHING: Primary | ICD-10-CM

## 2018-11-23 DIAGNOSIS — B37.2 YEAST INFECTION OF THE SKIN: Primary | ICD-10-CM

## 2018-11-23 DIAGNOSIS — N76.0 BV (BACTERIAL VAGINOSIS): Primary | ICD-10-CM

## 2018-11-23 DIAGNOSIS — N76.0 ACUTE VAGINITIS: ICD-10-CM

## 2018-11-23 DIAGNOSIS — B96.89 BV (BACTERIAL VAGINOSIS): Primary | ICD-10-CM

## 2018-11-23 LAB
CANDIDA RRNA VAG QL PROBE: NEGATIVE
G VAGINALIS RRNA GENITAL QL PROBE: POSITIVE
T VAGINALIS RRNA GENITAL QL PROBE: NEGATIVE

## 2018-11-23 PROCEDURE — 3008F BODY MASS INDEX DOCD: CPT | Mod: CPTII,S$GLB,, | Performed by: NURSE PRACTITIONER

## 2018-11-23 PROCEDURE — 99214 OFFICE O/P EST MOD 30 MIN: CPT | Mod: S$GLB,,, | Performed by: NURSE PRACTITIONER

## 2018-11-23 PROCEDURE — 99999 PR PBB SHADOW E&M-EST. PATIENT-LVL III: CPT | Mod: PBBFAC,,, | Performed by: NURSE PRACTITIONER

## 2018-11-23 PROCEDURE — 87660 TRICHOMONAS VAGIN DIR PROBE: CPT

## 2018-11-23 RX ORDER — METRONIDAZOLE 500 MG/1
500 TABLET ORAL 2 TIMES DAILY
Qty: 14 TABLET | Refills: 0 | Status: SHIPPED | OUTPATIENT
Start: 2018-11-23 | End: 2018-11-30

## 2018-11-23 RX ORDER — CLOTRIMAZOLE AND BETAMETHASONE DIPROPIONATE 10; .64 MG/G; MG/G
CREAM TOPICAL 2 TIMES DAILY
Qty: 45 G | Refills: 1 | Status: SHIPPED | OUTPATIENT
Start: 2018-11-23 | End: 2018-11-28

## 2018-11-23 NOTE — PROGRESS NOTES
CC: Vaginal Itching    Doroteo Sun is a 33 y.o. female  presents with complaint of vaginal itching for 1 week.  She reports brown discharge at the beginning of symptoms. Denies odor.  Patient took diflucan x 2 4 days ago with mild relief. No new sexual partners.        ROS:  GENERAL: No fever, chills, fatigability or weight loss.  VULVAR: No pain, no lesions and no itching.  VAGINAL: No relaxation, + itching, no discharge currently, no abnormal bleeding and no lesions.  ABDOMEN: No abdominal pain. Denies nausea. Denies vomiting. No diarrhea. No constipation  URINARY: No incontinence, no nocturia, no frequency and no dysuria.  CARDIOVASCULAR: No chest pain. No shortness of breath. No leg cramps.  NEUROLOGICAL: No headaches. No vision changes.    PHYSICAL EXAM:  VULVA: normal appearing vulva with no masses, tenderness or lesions. + several inclusion cysts near vaginal introitus.  VAGINA: normal appearing vagina with normal color and + menstrual discharge, no lesions   CERVIX: normal appearing cervix with menstrual discharge or lesions   PERINEUM: Discoloration of skin to lower buttocks. Yeast-like skin changes at gluteal fold      Diagnosis:  1. Yeast infection of the skin    2. Acute vaginitis           Orders Placed This Encounter    clotrimazole-betamethasone 1-0.05% (LOTRISONE) cream       Plan:  Affirm  lotrisone  Discussed warm compresses to inclusion cysts    Patient was counseled today on vaginitis prevention including :  a. avoiding feminine products such as deoderant soaps, body wash, bubble bath, douches, scented toilet paper, deoderant tampons or pads, feminine wipes, chronic pad use, etc.  b. avoiding other vulvovaginal irritants such as long hot baths, humidity, tight, synthetic clothing, chlorine and sitting around in wet bathing suits  c. wearing cotton underwear, avoiding thong underwear and no underwear to bed  d. taking showers instead of baths and use a hair dryer on cool setting  afterwards to dry  e. wearing cotton to exercise and shower immediately after exercise and change clothes  f. using polyurethane condoms without spermicide if sexually active and symptoms are triggered by intercourse    FOLLOW UP: PRN lack of improvement.

## 2018-11-30 DIAGNOSIS — S86.912A MUSCLE STRAIN OF LEFT LOWER LEG, INITIAL ENCOUNTER: ICD-10-CM

## 2018-11-30 DIAGNOSIS — G44.219 EPISODIC TENSION-TYPE HEADACHE, NOT INTRACTABLE: ICD-10-CM

## 2018-11-30 DIAGNOSIS — M79.605 LEFT LEG PAIN: ICD-10-CM

## 2018-11-30 DIAGNOSIS — R03.0 ELEVATED BP WITHOUT DIAGNOSIS OF HYPERTENSION: ICD-10-CM

## 2018-11-30 RX ORDER — IBUPROFEN 800 MG/1
800 TABLET ORAL 3 TIMES DAILY PRN
Qty: 60 TABLET | Refills: 2 | Status: SHIPPED | OUTPATIENT
Start: 2018-11-30 | End: 2019-11-12 | Stop reason: SDUPTHER

## 2018-11-30 RX ORDER — PROPRANOLOL HYDROCHLORIDE 10 MG/1
10 TABLET ORAL 2 TIMES DAILY
Qty: 60 TABLET | Refills: 0 | Status: SHIPPED | OUTPATIENT
Start: 2018-11-30 | End: 2019-05-29 | Stop reason: SDUPTHER

## 2018-12-11 DIAGNOSIS — F41.9 ANXIETY DISORDER, UNSPECIFIED TYPE: ICD-10-CM

## 2018-12-11 RX ORDER — ESCITALOPRAM OXALATE 5 MG/1
5 TABLET ORAL DAILY
Qty: 90 TABLET | Refills: 1 | Status: SHIPPED | OUTPATIENT
Start: 2018-12-11 | End: 2018-12-13 | Stop reason: SDUPTHER

## 2018-12-13 ENCOUNTER — PATIENT MESSAGE (OUTPATIENT)
Dept: FAMILY MEDICINE | Facility: CLINIC | Age: 33
End: 2018-12-13

## 2018-12-13 DIAGNOSIS — F41.9 ANXIETY DISORDER, UNSPECIFIED TYPE: ICD-10-CM

## 2018-12-13 RX ORDER — ESCITALOPRAM OXALATE 5 MG/1
5 TABLET ORAL DAILY
Qty: 90 TABLET | Refills: 1 | OUTPATIENT
Start: 2018-12-13 | End: 2019-02-07 | Stop reason: SDUPTHER

## 2018-12-17 ENCOUNTER — PATIENT MESSAGE (OUTPATIENT)
Dept: OBSTETRICS AND GYNECOLOGY | Facility: CLINIC | Age: 33
End: 2018-12-17

## 2018-12-20 ENCOUNTER — OFFICE VISIT (OUTPATIENT)
Dept: DERMATOLOGY | Facility: CLINIC | Age: 33
End: 2018-12-20
Payer: COMMERCIAL

## 2018-12-20 DIAGNOSIS — L70.0 ACNE VULGARIS: Primary | ICD-10-CM

## 2018-12-20 DIAGNOSIS — L72.9 SUBCUTANEOUS CYST: ICD-10-CM

## 2018-12-20 DIAGNOSIS — L98.9 DERMATOSIS: ICD-10-CM

## 2018-12-20 DIAGNOSIS — L85.3 XEROSIS OF SKIN: ICD-10-CM

## 2018-12-20 PROCEDURE — 99214 OFFICE O/P EST MOD 30 MIN: CPT | Mod: S$GLB,,, | Performed by: NURSE PRACTITIONER

## 2018-12-20 PROCEDURE — 99999 PR PBB SHADOW E&M-EST. PATIENT-LVL II: CPT | Mod: PBBFAC,,, | Performed by: NURSE PRACTITIONER

## 2018-12-20 RX ORDER — DOXYCYCLINE 100 MG/1
100 CAPSULE ORAL DAILY
Qty: 30 CAPSULE | Refills: 3 | Status: SHIPPED | OUTPATIENT
Start: 2018-12-20 | End: 2019-05-11

## 2018-12-20 RX ORDER — CLINDAMYCIN PHOSPHATE AND BENZOYL PEROXIDE 10; 50 MG/G; MG/G
GEL TOPICAL
Qty: 45 G | Refills: 2 | Status: SHIPPED | OUTPATIENT
Start: 2018-12-20 | End: 2019-05-11

## 2018-12-20 RX ORDER — FLUOCINONIDE 0.5 MG/G
CREAM TOPICAL
Qty: 30 G | Refills: 1 | Status: SHIPPED | OUTPATIENT
Start: 2018-12-20 | End: 2019-06-25 | Stop reason: SDUPTHER

## 2018-12-20 NOTE — PROGRESS NOTES
Subjective:       Patient ID:  Doroteo Sun is a 33 y.o. female who presents for   Chief Complaint   Patient presents with    Rash     follow up     Rash  - Follow-up  Symptom course: unchanged  Currently using: last seen [facility] 11/7/18. washing face nightly with dove soap. tazorac nightly. feels like rash to forehead has improved but feels like acne to cheeks has not improved.  Affected locations: forehead and face  Signs / symptoms: dryness  Severity: mild      Also c/o new rash to chest & back  +itching  x2 weeks  otc jergens    Denies any previous h/o atop derm    Review of Systems   Constitutional: Negative for fever, chills, weight loss, weight gain, fatigue, night sweats and malaise.   Skin: Positive for rash, dry skin and tendency to form keloidal scars.   Hematologic/Lymphatic: Bruises/bleeds easily.        Objective:    Physical Exam   Constitutional: She appears well-developed and well-nourished. No distress.   Neurological: She is alert and oriented to person, place, and time. She is not disoriented.   Psychiatric: She has a normal mood and affect.   Skin:   Areas Examined (abnormalities noted in diagram):   Scalp / Hair Palpated and Inspected  Head / Face Inspection Performed  Neck Inspection Performed  Chest / Axilla Inspection Performed  Back Inspection Performed  RUE Inspected  LUE Inspection Performed                   Diagram Legend     Erythematous scaling macule/papule c/w actinic keratosis       Vascular papule c/w angioma      Pigmented verrucoid papule/plaque c/w seborrheic keratosis      Yellow umbilicated papule c/w sebaceous hyperplasia      Irregularly shaped tan macule c/w lentigo     1-2 mm smooth white papules consistent with Milia      Movable subcutaneous cyst with punctum c/w epidermal inclusion cyst      Subcutaneous movable cyst c/w pilar cyst      Firm pink to brown papule c/w dermatofibroma      Pedunculated fleshy papule(s) c/w skin tag(s)      Evenly pigmented macule c/w  junctional nevus     Mildly variegated pigmented, slightly irregular-bordered macule c/w mildly atypical nevus      Flesh colored to evenly pigmented papule c/w intradermal nevus       Pink pearly papule/plaque c/w basal cell carcinoma      Erythematous hyperkeratotic cursted plaque c/w SCC      Surgical scar with no sign of skin cancer recurrence      Open and closed comedones      Inflammatory papules and pustules      Verrucoid papule consistent consistent with wart     Erythematous eczematous patches and plaques     Dystrophic onycholytic nail with subungual debris c/w onychomycosis     Umbilicated papule    Erythematous-base heme-crusted tan verrucoid plaque consistent with inflamed seborrheic keratosis     Erythematous Silvery Scaling Plaque c/w Psoriasis     See annotation      Assessment / Plan:        Acne vulgaris (E[facility])  -     doxycycline (VIBRAMYCIN) 100 MG Cap; Take 1 capsule (100 mg total) by mouth once daily.  Dispense: 30 capsule; Refill: 3  -     clindamycin-benzoyl peroxide (DUAC) gel; AAA BID prn acne  Dispense: 45 g; Refill: 2    Discussed benefits and risks of doxycyline therapy including but not limited to GI discomfort, esophageal irritation/ulceration, and increased sun sensitivity. Patient was counseled to take medicine with meals and at least 1 hour before lying down.     Continue Tazorac 0.1%    Add Duac gel nightly w/ tazorac, recommend OTC moisturizer following prescriptions cream    Wash face BID    Dermatosis (NP)  ICD (2/2 silver chain necklace) vs ACD vs AD  -     fluocinonide 0.05% (LIDEX) 0.05 % cream; AAA BID prn redness, irritation  Dispense: 30 g; Refill: 1    Xerosis of skin (NP)  cerave cream daily-bid  Dove soap  Good skin care regimen discussed including limiting to one bath or shower/day, using lukewarm water with mild soap and moisturizing cream to skin 1 - 2x/day. Brochure was provided and reviewed with patient.    Subcutaneous cyst (NP)  Reassurance given to patient. No  treatment is necessary.   Discussed treatment options - excision vs observation. Cysts may recur with excision. Pt will defer treatment at this time.                Follow-up in about 2 months (around 2/20/2019).

## 2018-12-26 ENCOUNTER — TELEPHONE (OUTPATIENT)
Dept: PHARMACY | Facility: CLINIC | Age: 33
End: 2018-12-26

## 2019-01-07 ENCOUNTER — PATIENT MESSAGE (OUTPATIENT)
Dept: DERMATOLOGY | Facility: CLINIC | Age: 34
End: 2019-01-07

## 2019-01-08 ENCOUNTER — PATIENT MESSAGE (OUTPATIENT)
Dept: DERMATOLOGY | Facility: CLINIC | Age: 34
End: 2019-01-08

## 2019-01-09 ENCOUNTER — OFFICE VISIT (OUTPATIENT)
Dept: DERMATOLOGY | Facility: CLINIC | Age: 34
End: 2019-01-09
Payer: COMMERCIAL

## 2019-01-09 DIAGNOSIS — L70.0 ACNE VULGARIS: Primary | ICD-10-CM

## 2019-01-09 PROCEDURE — 99999 PR PBB SHADOW E&M-EST. PATIENT-LVL III: ICD-10-PCS | Mod: PBBFAC,,, | Performed by: NURSE PRACTITIONER

## 2019-01-09 PROCEDURE — 99213 PR OFFICE/OUTPT VISIT, EST, LEVL III, 20-29 MIN: ICD-10-PCS | Mod: S$GLB,,, | Performed by: NURSE PRACTITIONER

## 2019-01-09 PROCEDURE — 99999 PR PBB SHADOW E&M-EST. PATIENT-LVL III: CPT | Mod: PBBFAC,,, | Performed by: NURSE PRACTITIONER

## 2019-01-09 PROCEDURE — 99213 OFFICE O/P EST LOW 20 MIN: CPT | Mod: S$GLB,,, | Performed by: NURSE PRACTITIONER

## 2019-01-09 RX ORDER — FLUCONAZOLE 200 MG/1
TABLET ORAL
Qty: 4 TABLET | Refills: 0 | Status: SHIPPED | OUTPATIENT
Start: 2019-01-09 | End: 2019-05-11

## 2019-01-09 RX ORDER — DOXYCYCLINE 100 MG/1
CAPSULE ORAL
Qty: 50 CAPSULE | Refills: 1 | Status: SHIPPED | OUTPATIENT
Start: 2019-01-09 | End: 2019-05-11

## 2019-01-09 RX ORDER — SPIRONOLACTONE 50 MG/1
TABLET, FILM COATED ORAL
Qty: 60 TABLET | Refills: 3 | Status: SHIPPED | OUTPATIENT
Start: 2019-01-09 | End: 2019-05-24

## 2019-01-09 NOTE — PROGRESS NOTES
Subjective:       Patient ID:  Doroteo Sun is a 33 y.o. female who presents for   Chief Complaint   Patient presents with    Acne     follow-up      Acne  - Follow-up  Symptom course: worsening  Affected locations: face  Signs / symptoms: asymptomatic  Severity: mild to moderate        Review of Systems   Constitutional: Negative for fever, chills, weight loss, weight gain, fatigue, night sweats and malaise.   Genitourinary: Negative for irregular periods ( on OCP).        Objective:    Physical Exam   Constitutional: She appears well-developed and well-nourished. No distress.   Neurological: She is alert and oriented to person, place, and time. She is not disoriented.   Psychiatric: She has a normal mood and affect.   Skin:   Areas Examined (abnormalities noted in diagram):   Head / Face Inspection Performed  Neck Inspection Performed  Chest / Axilla Inspection Performed              Diagram Legend     Erythematous scaling macule/papule c/w actinic keratosis       Vascular papule c/w angioma      Pigmented verrucoid papule/plaque c/w seborrheic keratosis      Yellow umbilicated papule c/w sebaceous hyperplasia      Irregularly shaped tan macule c/w lentigo     1-2 mm smooth white papules consistent with Milia      Movable subcutaneous cyst with punctum c/w epidermal inclusion cyst      Subcutaneous movable cyst c/w pilar cyst      Firm pink to brown papule c/w dermatofibroma      Pedunculated fleshy papule(s) c/w skin tag(s)      Evenly pigmented macule c/w junctional nevus     Mildly variegated pigmented, slightly irregular-bordered macule c/w mildly atypical nevus      Flesh colored to evenly pigmented papule c/w intradermal nevus       Pink pearly papule/plaque c/w basal cell carcinoma      Erythematous hyperkeratotic cursted plaque c/w SCC      Surgical scar with no sign of skin cancer recurrence      Open and closed comedones      Inflammatory papules and pustules      Verrucoid papule consistent  consistent with wart     Erythematous eczematous patches and plaques     Dystrophic onycholytic nail with subungual debris c/w onychomycosis     Umbilicated papule    Erythematous-base heme-crusted tan verrucoid plaque consistent with inflamed seborrheic keratosis     Erythematous Silvery Scaling Plaque c/w Psoriasis     See annotation      Assessment / Plan:        Acne vulgaris  -     spironolactone (ALDACTONE) 50 MG tablet; Start with 1 po qday, increase to 2 po qday as tolerated  Dispense: 60 tablet; Refill: 3  -     doxycycline (VIBRAMYCIN) 100 MG Cap; Take 1 tablet by mouth twice daily for 2 weeks, followed by 1 tablet by mouth once daily  Dispense: 50 capsule; Refill: 1    Wash face BID  Cerave  Continue tazorac  D/c duac gel  Increase doxy to BID- diflucan order sent over  Continue OCP  Discussed benefits and risks of therapy including but not limited to breakthrough bleeding, breast tenderness, and elevated potassium levels which may give symptoms of fatigue, palpitations, and nausea. Patient should limit potassium intake - avoid potassium supplements or salt substitutes, limit bananas and citrus fruits. Pregnancy must be avoided while taking spironolactone.    Discussed would not consider another course of accutane w/ current physical exam      Other orders  -     fluconazole (DIFLUCAN) 200 MG Tab; 1 po biw x 2 weeks  Dispense: 4 tablet; Refill: 0             Follow-up in about 6 weeks (around 2/20/2019).

## 2019-01-09 NOTE — PATIENT INSTRUCTIONS
Doxy 1 tab twice/day for two weeks, followed by 1 tab daily    Spironolactone start w/ 1 tab daily x1 week, then increase to two tabs daily    Discontinue duac gel    Cont tazorac nightly    Diflucan as needed for vaginal itching

## 2019-02-07 DIAGNOSIS — F41.9 ANXIETY DISORDER, UNSPECIFIED TYPE: ICD-10-CM

## 2019-02-07 RX ORDER — ESCITALOPRAM OXALATE 5 MG/1
5 TABLET ORAL DAILY
Qty: 90 TABLET | Refills: 1 | Status: SHIPPED | OUTPATIENT
Start: 2019-02-07 | End: 2020-04-20 | Stop reason: SDUPTHER

## 2019-02-11 ENCOUNTER — PATIENT MESSAGE (OUTPATIENT)
Dept: OBSTETRICS AND GYNECOLOGY | Facility: CLINIC | Age: 34
End: 2019-02-11

## 2019-02-22 ENCOUNTER — OFFICE VISIT (OUTPATIENT)
Dept: DERMATOLOGY | Facility: CLINIC | Age: 34
End: 2019-02-22
Payer: COMMERCIAL

## 2019-02-22 ENCOUNTER — PATIENT MESSAGE (OUTPATIENT)
Dept: DERMATOLOGY | Facility: CLINIC | Age: 34
End: 2019-02-22

## 2019-02-22 DIAGNOSIS — L70.0 ACNE VULGARIS: Primary | ICD-10-CM

## 2019-02-22 PROCEDURE — 99213 OFFICE O/P EST LOW 20 MIN: CPT | Mod: S$GLB,,, | Performed by: NURSE PRACTITIONER

## 2019-02-22 PROCEDURE — 99999 PR PBB SHADOW E&M-EST. PATIENT-LVL II: CPT | Mod: PBBFAC,,, | Performed by: NURSE PRACTITIONER

## 2019-02-22 PROCEDURE — 99999 PR PBB SHADOW E&M-EST. PATIENT-LVL II: ICD-10-PCS | Mod: PBBFAC,,, | Performed by: NURSE PRACTITIONER

## 2019-02-22 PROCEDURE — 99213 PR OFFICE/OUTPT VISIT, EST, LEVL III, 20-29 MIN: ICD-10-PCS | Mod: S$GLB,,, | Performed by: NURSE PRACTITIONER

## 2019-02-22 NOTE — PROGRESS NOTES
Subjective:       Patient ID:  Doroteo Sun is a 33 y.o. female who presents for   Chief Complaint   Patient presents with    Acne     follow-up      Acne  - Follow-up  Symptom course: worsening (last seen 1/9/19)  Currently using: Washing face BID. Spironolactone 50mg/day. last dose of PO doxy was 1/15/19.  Affected locations: face  Signs / symptoms: asymptomatic  Severity: mild to moderate    reports 100mg/day of Spironolactone made her too sleepy    Review of Systems   Constitutional: Negative for fever, chills, weight loss, weight gain, fatigue, night sweats and malaise.   Genitourinary: Negative for irregular periods ( on OCP).   Skin: Negative for daily sunscreen use.        Objective:    Physical Exam   Constitutional: She appears well-developed and well-nourished. No distress.   Neurological: She is alert and oriented to person, place, and time. She is not disoriented.   Psychiatric: She has a normal mood and affect.   Skin:   Areas Examined (abnormalities noted in diagram):   Head / Face Inspection Performed  Neck Inspection Performed  Chest / Axilla Inspection Performed              Diagram Legend     Erythematous scaling macule/papule c/w actinic keratosis       Vascular papule c/w angioma      Pigmented verrucoid papule/plaque c/w seborrheic keratosis      Yellow umbilicated papule c/w sebaceous hyperplasia      Irregularly shaped tan macule c/w lentigo     1-2 mm smooth white papules consistent with Milia      Movable subcutaneous cyst with punctum c/w epidermal inclusion cyst      Subcutaneous movable cyst c/w pilar cyst      Firm pink to brown papule c/w dermatofibroma      Pedunculated fleshy papule(s) c/w skin tag(s)      Evenly pigmented macule c/w junctional nevus     Mildly variegated pigmented, slightly irregular-bordered macule c/w mildly atypical nevus      Flesh colored to evenly pigmented papule c/w intradermal nevus       Pink pearly papule/plaque c/w basal cell carcinoma       Erythematous hyperkeratotic cursted plaque c/w SCC      Surgical scar with no sign of skin cancer recurrence      Open and closed comedones      Inflammatory papules and pustules      Verrucoid papule consistent consistent with wart     Erythematous eczematous patches and plaques     Dystrophic onycholytic nail with subungual debris c/w onychomycosis     Umbilicated papule    Erythematous-base heme-crusted tan verrucoid plaque consistent with inflamed seborrheic keratosis     Erythematous Silvery Scaling Plaque c/w Psoriasis     See annotation      Assessment / Plan:        Acne vulgaris-   comedonal R cheek >Left    Discussed increasing Spironolactone to 75mg/day.  Continue Current regimen. Washing face BID Tazorac nightly.  Continue OCP    Discussed if not happy with skin at follow up, would consider another course of Isotretinoin, pending approval w/ Dr. Jeronimo            Follow-up in about 2 months (around 4/22/2019).

## 2019-02-26 ENCOUNTER — PATIENT MESSAGE (OUTPATIENT)
Dept: FAMILY MEDICINE | Facility: CLINIC | Age: 34
End: 2019-02-26

## 2019-03-01 ENCOUNTER — TELEPHONE (OUTPATIENT)
Dept: FAMILY MEDICINE | Facility: CLINIC | Age: 34
End: 2019-03-01

## 2019-03-01 NOTE — TELEPHONE ENCOUNTER
----- Message from Leslie Salazar sent at 3/1/2019  9:44 AM CST -----  Contact: self [phone]  Patient would like a steriod shot in her hip today or Monday. Please call and advise.

## 2019-03-02 ENCOUNTER — OFFICE VISIT (OUTPATIENT)
Dept: FAMILY MEDICINE | Facility: CLINIC | Age: 34
End: 2019-03-02
Payer: COMMERCIAL

## 2019-03-02 VITALS
OXYGEN SATURATION: 100 % | DIASTOLIC BLOOD PRESSURE: 86 MMHG | BODY MASS INDEX: 29.69 KG/M2 | HEIGHT: 66 IN | SYSTOLIC BLOOD PRESSURE: 120 MMHG | HEART RATE: 82 BPM | WEIGHT: 184.75 LBS

## 2019-03-02 DIAGNOSIS — M70.61 TROCHANTERIC BURSITIS OF BOTH HIPS: Primary | ICD-10-CM

## 2019-03-02 DIAGNOSIS — M70.62 TROCHANTERIC BURSITIS OF BOTH HIPS: Primary | ICD-10-CM

## 2019-03-02 PROCEDURE — 3008F BODY MASS INDEX DOCD: CPT | Mod: CPTII,S$GLB,, | Performed by: FAMILY MEDICINE

## 2019-03-02 PROCEDURE — 99214 PR OFFICE/OUTPT VISIT, EST, LEVL IV, 30-39 MIN: ICD-10-PCS | Mod: S$GLB,,, | Performed by: FAMILY MEDICINE

## 2019-03-02 PROCEDURE — 99999 PR PBB SHADOW E&M-EST. PATIENT-LVL IV: ICD-10-PCS | Mod: PBBFAC,,, | Performed by: FAMILY MEDICINE

## 2019-03-02 PROCEDURE — 99214 OFFICE O/P EST MOD 30 MIN: CPT | Mod: S$GLB,,, | Performed by: FAMILY MEDICINE

## 2019-03-02 PROCEDURE — 99999 PR PBB SHADOW E&M-EST. PATIENT-LVL IV: CPT | Mod: PBBFAC,,, | Performed by: FAMILY MEDICINE

## 2019-03-02 PROCEDURE — 3008F PR BODY MASS INDEX (BMI) DOCUMENTED: ICD-10-PCS | Mod: CPTII,S$GLB,, | Performed by: FAMILY MEDICINE

## 2019-03-02 RX ORDER — CYCLOBENZAPRINE HCL 5 MG
5 TABLET ORAL 3 TIMES DAILY PRN
Qty: 30 TABLET | Refills: 0 | Status: SHIPPED | OUTPATIENT
Start: 2019-03-02 | End: 2019-03-12

## 2019-03-02 NOTE — PROGRESS NOTES
(Portions of this note were dictated using voice recognition software and may contain dictation related errors in spelling/grammar/syntax not found on text review)    CC:   Chief Complaint   Patient presents with    Hip Pain     x 2 weeks       HPI: 33 y.o. female Last visit with me June of 2018    Had been seen by me in   2015 through 2017  for leg pain from trochanteric bursitis.  Had injected both trochanteric bursae at that time.  Later had more anterior hip pain, x-ray reviewed from 2016 did not show any significant degeneration .  Was enrolled in physical therapy in 2016 for left hip pain.    Chart review shows recent visit with Dr. Ribera September 2018 for left leg and thigh pain which was fairly acute for 1-2 weeks.  According to review of his assessment, Was diagnosed with muscular pain. Venous ultrasound was done at that time, reviewed and was negative.  Advised on NSAIDs, heat and rest    Interval history:  She was overall doing very well until 2 weeks ago when she started back with left hip pain followed by a right hip pain, currently bilateral hips hurt laterally.  Focused symptoms upper lateral hip area, no radiation down the leg.  Was having some groin pain for 1 day but thinks this is this is because she was on her feet a lot that day.  Has not returned.  No buttock pain.  No recent trauma.  She is finding it difficult to lie on either side because of the pain. Has been taking ibuprofen 800 mg b.i.d. which does give significant relief of her symptoms.  Has been using local heat application which also helps.  Leaving for [place] tomorrow for a week.  Is hoping that she will be more relaxed and off her feet a lot.  She feels like a lot of the symptoms may have gotten worse because she is working a lot and on her feet.  She was wondering however if she needs any acute management like repeat steroid injection before she leaves in addition to her current therapy.  She did find the steroid shots helped a  lot when she took them last time.    Past Medical History:   Diagnosis Date    Acne     Anxiety     Chronic constipation        No past surgical history on file.    Family History   Problem Relation Age of Onset    Hypertension Other     Cancer Neg Hx     Diabetes Neg Hx     Breast cancer Neg Hx     Colon cancer Neg Hx     Ovarian cancer Neg Hx     Cervical cancer Neg Hx     Endometrial cancer Neg Hx     Vaginal cancer Neg Hx     Melanoma Neg Hx        Social History     Socioeconomic History    Marital status: Single     Spouse name: Not on file    Number of children: Not on file    Years of education: Not on file    Highest education level: Not on file   Social Needs    Financial resource strain: Not on file    Food insecurity - worry: Not on file    Food insecurity - inability: Not on file    Transportation needs - medical: Not on file    Transportation needs - non-medical: Not on file   Occupational History     Employer: [employer]   Tobacco Use    Smoking status: Never Smoker    Smokeless tobacco: Never Used   Substance and Sexual Activity    Alcohol use: Yes     Alcohol/week: 0.0 oz     Comment: Socially.    Drug use: No    Sexual activity: Yes     Partners: Male     Birth control/protection: None   Other Topics Concern    Are you pregnant or think you may be? Not Asked    Breast-feeding Not Asked   Social History Narrative    Not on file       ROS:  GENERAL: No fever, chills, fatigability or weight loss.  SKIN: No rashes, no itching.  HEAD: No headaches.  EYES: No visual changes  EARS: No ear pain or changes in hearing.  NOSE: No congestion or rhinorrhea.  MOUTH & THROAT: No hoarseness, change in voice, or sore throat.  NODES: Denies swollen glands.  CHEST: Denies RANDALL, cyanosis, wheezing, cough and sputum production.  CARDIOVASCULAR: Denies chest pain, PND, orthopnea.  ABDOMEN: No nausea, vomiting, or changes in bowel function.  URINARY: No flank pain, dysuria or  hematuria.  PERIPHERAL VASCULAR: No claudication or cyanosis.  MUSCULOSKELETAL:  Above  NEUROLOGIC: No weakness or numbness.    Vital signs reviewed  PE:   APPEARANCE: Well nourished, well developed, in no acute distress.    HEAD: Normocephalic, atraumatic.  EYES:.   Conjunctivae noninjected.  EXTREMITIES: No edema, cyanosis, or clubbing.  MSK:  Bilateral trochanteric tenderness to palpation.  Normal internal and external rotation of both femoroacetabular joints.  Positive Shirin's test on the right.  Negative Shirin's test on the left.  Mild point tenderness lower midline lumbar spine but no paraspinal tenderness or sacroiliac tenderness. Negative Yasmany's test bilaterally.    IMPRESSION  1. Trochanteric bursitis of both hips    \      PLAN  Chronic problem but had been in remission until just recently.  Discussed therapy options including  1.  Continue current therapy, perform bilateral trochanteric bursa corticosteroid injections today    Or    2.  Defer corticosteroid injection today, increase ibuprofen to 800 mg t.i.d., add Flexeril 5 mg t.i.d. as needed (did find that this helped in the past when she was taking this), iliotibial band stretching exercises, continue local heat  or  ice application .  Consider future corticosteroid injection if failing of the above therapy    Patient selects option 2.    Will schedule for a follow-up visit on March 22, 2019 to consider directed corticosteroid injection if she does fail the above therapy.  If she feels like her symptoms are getting much better, she can cancel that appointment

## 2019-03-02 NOTE — PATIENT INSTRUCTIONS
Trochanteric Bursitis Rehabilitation Exercises  You can begin stretching the muscles that run along the outside of your hip using the first 3 exercises. You can do the strengthening exercises when the sharp pain lessens.   Stretching exercises   Gluteal stretch: Lying on your back with both knees bent, rest the ankle of one leg over the knee of your other leg. Grasp the thigh of the bottom leg and pull that knee toward your chest. You will feel a stretch along the buttocks and possibly along the outside of your hip on the top leg. Hold this for 15 to 30 seconds. Repeat 3 times.   Iliotibial band stretch: Standing: Cross one leg in front of the other leg and bend down and touch your toes. You can move your hands across the floor toward the front leg and you will feel more stretch on the outside of your thigh on the other side. Hold this position for 15 to 30 seconds. Return to the starting position. Repeat 3 times. Reverse the positions of your legs and repeat.   Iliotibial band stretch: Side-leaning: Stand sideways near a wall. Place one hand on the wall for support. Cross the leg farthest from the wall over the other leg, keeping the foot closest to the wall flat on the floor. Lean your hips into the wall. Hold the stretch for 15 seconds, repeat 3 times, and then switch legs and repeat the exercise another 3 times.   Strengthening exercises   Straight leg raise: Lie on your back with your legs straight out in front of you. Bend the knee on your uninjured side and place the foot flat on the floor. Tighten the thigh muscle of the other leg and lift it about 8 inches off the floor, keeping the thigh muscle tight throughout. Slowly lower your leg back down to the floor. Do 3 sets of 10.   Prone hip extension: Lie on your stomach with your legs straight out behind you. Tighten the buttocks and thigh muscles of your injured leg and lift it off the floor about 8 inches. Keep your knee straight. Hold for 5 seconds. Then  lower your leg and relax. Do 3 sets of 10.   Side-lying leg lift: Lying on your uninjured side, tighten the front thigh muscles on your top leg and lift that leg 8 to 10 inches away from the other leg. Keep the leg straight and lower slowly. Do 3 sets of 10.   Wall squat with a ball: Stand with your back, shoulders, and head against a wall and look straight ahead. Keep your shoulders relaxed and your feet 2 feet away from the wall and a shoulder's width apart. Place a soccer or basketball-sized ball behind your back. Keeping your back upright, slowly squat down to a 45-degree angle. Your thighs will not yet be parallel to the floor. Hold this position for 10 seconds and then slowly slide back up the wall. Repeat 10 times. Build up to 3 sets of 10.   Written by Tanya Knox MS, PT, and Stephanie Mojica PT, [facility], [facility], for [employer].   Published by [facility].  © 2009 [facility] and/or its affiliates. All Rights Reserved.

## 2019-03-15 ENCOUNTER — PATIENT MESSAGE (OUTPATIENT)
Dept: GASTROENTEROLOGY | Facility: CLINIC | Age: 34
End: 2019-03-15

## 2019-03-21 ENCOUNTER — TELEPHONE (OUTPATIENT)
Dept: PHARMACY | Facility: CLINIC | Age: 34
End: 2019-03-21

## 2019-04-17 ENCOUNTER — PATIENT MESSAGE (OUTPATIENT)
Dept: DERMATOLOGY | Facility: CLINIC | Age: 34
End: 2019-04-17

## 2019-04-25 ENCOUNTER — OFFICE VISIT (OUTPATIENT)
Dept: DERMATOLOGY | Facility: CLINIC | Age: 34
End: 2019-04-25
Payer: COMMERCIAL

## 2019-04-25 DIAGNOSIS — L70.0 ACNE VULGARIS: Primary | ICD-10-CM

## 2019-04-25 PROCEDURE — 99213 PR OFFICE/OUTPT VISIT, EST, LEVL III, 20-29 MIN: ICD-10-PCS | Mod: S$GLB,,, | Performed by: NURSE PRACTITIONER

## 2019-04-25 PROCEDURE — 99999 PR PBB SHADOW E&M-EST. PATIENT-LVL III: ICD-10-PCS | Mod: PBBFAC,,, | Performed by: NURSE PRACTITIONER

## 2019-04-25 PROCEDURE — 99999 PR PBB SHADOW E&M-EST. PATIENT-LVL III: CPT | Mod: PBBFAC,,, | Performed by: NURSE PRACTITIONER

## 2019-04-25 PROCEDURE — 99213 OFFICE O/P EST LOW 20 MIN: CPT | Mod: S$GLB,,, | Performed by: NURSE PRACTITIONER

## 2019-04-25 NOTE — PROGRESS NOTES
Subjective:       Patient ID:  Doroteo Sun is a 33 y.o. female who presents for   Chief Complaint   Patient presents with    Acne     follow-up / one new flare      Acne  - Follow-up  Symptom course: unchanged (last seen 2/22/19)  Currently using: completed PO course of Doxy- last pill x2 days ago. stopped spironolactone 75mg 2/2 felt like not working. tazorac 0.1% nightly.  Affected locations: face  Signs / symptoms: asymptomatic  Severity: mild to moderate    completed accutane course w/ [facility] in 2017    Review of Systems   Constitutional: Negative for fever, chills and fatigue.   HENT: Negative for nosebleeds and headaches.    Gastrointestinal: Negative for diarrhea and Sensitivity to oral antibiotics.   Genitourinary: Negative for irregular periods (on ocp).   Musculoskeletal: Negative for arthralgias.   Skin: Positive for activity-related sunscreen use. Negative for daily sunscreen use and recent sunburn.   Neurological: Negative for headaches.   Psychiatric/Behavioral: Negative for depressed mood.        Objective:    Physical Exam   Constitutional: She appears well-developed and well-nourished. No distress.   Neurological: She is alert and oriented to person, place, and time. She is not disoriented.   Psychiatric: She has a normal mood and affect.   Skin:   Areas Examined (abnormalities noted in diagram):   Head / Face Inspection Performed  Neck Inspection Performed  Chest / Axilla Inspection Performed  Back Inspection Performed  RUE Inspected  LUE Inspection Performed                   Diagram Legend     Erythematous scaling macule/papule c/w actinic keratosis       Vascular papule c/w angioma      Pigmented verrucoid papule/plaque c/w seborrheic keratosis      Yellow umbilicated papule c/w sebaceous hyperplasia      Irregularly shaped tan macule c/w lentigo     1-2 mm smooth white papules consistent with Milia      Movable subcutaneous cyst with punctum c/w epidermal inclusion cyst       Subcutaneous movable cyst c/w pilar cyst      Firm pink to brown papule c/w dermatofibroma      Pedunculated fleshy papule(s) c/w skin tag(s)      Evenly pigmented macule c/w junctional nevus     Mildly variegated pigmented, slightly irregular-bordered macule c/w mildly atypical nevus      Flesh colored to evenly pigmented papule c/w intradermal nevus       Pink pearly papule/plaque c/w basal cell carcinoma      Erythematous hyperkeratotic cursted plaque c/w SCC      Surgical scar with no sign of skin cancer recurrence      Open and closed comedones      Inflammatory papules and pustules      Verrucoid papule consistent consistent with wart     Erythematous eczematous patches and plaques     Dystrophic onycholytic nail with subungual debris c/w onychomycosis     Umbilicated papule    Erythematous-base heme-crusted tan verrucoid plaque consistent with inflamed seborrheic keratosis     Erythematous Silvery Scaling Plaque c/w Psoriasis     See annotation              Assessment / Plan:        Acne vulgaris  -     CBC auto differential; Future; Expected date: 04/25/2019  -     Hepatic function panel; Future; Expected date: 04/25/2019  -     Lipid panel; Future; Expected date: 04/25/2019  -     Pregnancy, urine rapid; Future    pt weighs #180lbs (#81kg)    On ocp    Discussed risks and benefits of Isotretinoin including but not limited to dry eyes, dry skin, and dry lips; headaches; nosebleeds; muscle aches; joint aches; elevated liver functions; elevated cholesterol or triglycerides; depression; diarrhea/stomach cramping (inflammatory bowel disease); increased sun sensitivity; birth defects. No laser while on Isotretinoin or for one month after completion of Isotretinoin course. No waxing and no donating blood while on Isotretinoin or for one month after completion of Isotretinoin course.  Patient to see GYN for OCP and call for fasting blood work after on OCP x 3 weeks - will start Isotretinoin at first menstrual cycle  after starting OCP. Patient counseled extensively regarding need for two forms of birth control while on Isotretinoin and for 1 month prior to and 1 month following treatment course.   Will get consents signed and enter patient into Ipledge program.    Will check baseline lipid panel, liver function tests and pregnancy test.   If labs normal, will start Isotretinoin at 80 mg po daily.   Brochures reviewed and provided.           Follow up in about 2 months (around 6/25/2019).

## 2019-04-26 ENCOUNTER — LAB VISIT (OUTPATIENT)
Dept: LAB | Facility: HOSPITAL | Age: 34
End: 2019-04-26
Payer: COMMERCIAL

## 2019-04-26 ENCOUNTER — PATIENT MESSAGE (OUTPATIENT)
Dept: DERMATOLOGY | Facility: CLINIC | Age: 34
End: 2019-04-26

## 2019-04-26 DIAGNOSIS — L70.0 ACNE VULGARIS: Primary | ICD-10-CM

## 2019-04-26 DIAGNOSIS — L70.0 ACNE VULGARIS: ICD-10-CM

## 2019-04-26 LAB
ALBUMIN SERPL BCP-MCNC: 3.4 G/DL (ref 3.5–5.2)
ALP SERPL-CCNC: 58 U/L (ref 55–135)
ALT SERPL W/O P-5'-P-CCNC: 10 U/L (ref 10–44)
AST SERPL-CCNC: 18 U/L (ref 10–40)
BASOPHILS # BLD AUTO: 0.01 K/UL (ref 0–0.2)
BASOPHILS NFR BLD: 0.3 % (ref 0–1.9)
BILIRUB DIRECT SERPL-MCNC: 0.3 MG/DL (ref 0.1–0.3)
BILIRUB SERPL-MCNC: 0.8 MG/DL (ref 0.1–1)
CHOLEST SERPL-MCNC: 162 MG/DL (ref 120–199)
CHOLEST/HDLC SERPL: 3.2 {RATIO} (ref 2–5)
DIFFERENTIAL METHOD: ABNORMAL
EOSINOPHIL # BLD AUTO: 0.1 K/UL (ref 0–0.5)
EOSINOPHIL NFR BLD: 1.5 % (ref 0–8)
ERYTHROCYTE [DISTWIDTH] IN BLOOD BY AUTOMATED COUNT: 13.5 % (ref 11.5–14.5)
HCT VFR BLD AUTO: 36.9 % (ref 37–48.5)
HDLC SERPL-MCNC: 50 MG/DL (ref 40–75)
HDLC SERPL: 30.9 % (ref 20–50)
HGB BLD-MCNC: 11.1 G/DL (ref 12–16)
IMM GRANULOCYTES # BLD AUTO: 0.01 K/UL (ref 0–0.04)
IMM GRANULOCYTES NFR BLD AUTO: 0.3 % (ref 0–0.5)
LDLC SERPL CALC-MCNC: 100 MG/DL (ref 63–159)
LYMPHOCYTES # BLD AUTO: 1.2 K/UL (ref 1–4.8)
LYMPHOCYTES NFR BLD: 35.8 % (ref 18–48)
MCH RBC QN AUTO: 25.6 PG (ref 27–31)
MCHC RBC AUTO-ENTMCNC: 30.1 G/DL (ref 32–36)
MCV RBC AUTO: 85 FL (ref 82–98)
MONOCYTES # BLD AUTO: 0.3 K/UL (ref 0.3–1)
MONOCYTES NFR BLD: 8.8 % (ref 4–15)
NEUTROPHILS # BLD AUTO: 1.8 K/UL (ref 1.8–7.7)
NEUTROPHILS NFR BLD: 53.3 % (ref 38–73)
NONHDLC SERPL-MCNC: 112 MG/DL
NRBC BLD-RTO: 0 /100 WBC
PLATELET # BLD AUTO: 344 K/UL (ref 150–350)
PMV BLD AUTO: 9.5 FL (ref 9.2–12.9)
PROT SERPL-MCNC: 7 G/DL (ref 6–8.4)
RBC # BLD AUTO: 4.34 M/UL (ref 4–5.4)
TRIGL SERPL-MCNC: 60 MG/DL (ref 30–150)
WBC # BLD AUTO: 3.41 K/UL (ref 3.9–12.7)

## 2019-04-26 PROCEDURE — 36415 COLL VENOUS BLD VENIPUNCTURE: CPT

## 2019-04-26 PROCEDURE — 85025 COMPLETE CBC W/AUTO DIFF WBC: CPT

## 2019-04-26 PROCEDURE — 80076 HEPATIC FUNCTION PANEL: CPT

## 2019-04-26 PROCEDURE — 80061 LIPID PANEL: CPT

## 2019-04-26 NOTE — PATIENT INSTRUCTIONS
ACCUTANE COUNSELING      · If you are a female, you are obligated  to use 2 forms of birth control  · You are not allowed to donate blood while taking Accutane and for 1 month after  · If you experience severe continuous headaches with nausea/vomiting and/or light sensitivity please go to the emergency room.  · Elective surgeries including dental procedures are not recommended while on therapy.  · Tetracycline antibiotics (including minocycline and doxycycline) should not be taken while on Accutane.  · It is recommended that you wait to have any laser surgery until 6months after completion of your Accutane course.  · Let any physician participating in your health care know that you are on Accutane.  · Do not share your medicine with anyone  · You may experience increase skin sensitivity if getting any hair bearing areas waxed.  · A low fat, low cholesterol diet and minimal alcohol intake are recommended whole on Accutane.  · You will experience increase sun sensitivity while on Accutane-sun protection including a hat, sunscreen (Elta MD or Neutrogena ultra sheer dry touch SPF55, and protective clothing are encouraged.  · There is increase susceptibility to bacterial (such as Staph) and viral infections (such as herpes simplex) while on this medication.  · Do not use any other oral or topical acne medications unless cleared by your doctor.  · We recommend Amnesteem as the generic alternative to the brand name Accutane.  · Take the medicine with food for best results.  · Common side effects include:  · Dry lips- do not use medicated lip balms such as blistex or carmex. Vaseline petroleum is best  · Dry skin-Luke warm baths, mild soap such as Cetaphil, moisturizing creams such as Cerave, Aveeno or Cetaphil                                                                          If you have any questions or concerns, please contact your healthcare provider    [address] [place] [address], [place] [address]/  [phone] FAX/ [email or web address]

## 2019-04-29 ENCOUNTER — LAB VISIT (OUTPATIENT)
Dept: LAB | Facility: HOSPITAL | Age: 34
End: 2019-04-29
Payer: COMMERCIAL

## 2019-04-29 DIAGNOSIS — D25.9 FIBROID, UTERINE: ICD-10-CM

## 2019-04-29 DIAGNOSIS — N93.8 DYSFUNCTIONAL UTERINE BLEEDING: Primary | ICD-10-CM

## 2019-04-29 LAB
BASOPHILS # BLD AUTO: 0.02 K/UL (ref 0–0.2)
BASOPHILS NFR BLD: 0.4 % (ref 0–1.9)
DIFFERENTIAL METHOD: ABNORMAL
EOSINOPHIL # BLD AUTO: 0.1 K/UL (ref 0–0.5)
EOSINOPHIL NFR BLD: 2.2 % (ref 0–8)
ERYTHROCYTE [DISTWIDTH] IN BLOOD BY AUTOMATED COUNT: 13.7 % (ref 11.5–14.5)
FSH SERPL-ACNC: 6.3 MIU/ML
HCT VFR BLD AUTO: 37.4 % (ref 37–48.5)
HGB BLD-MCNC: 11.2 G/DL (ref 12–16)
IMM GRANULOCYTES # BLD AUTO: 0.02 K/UL (ref 0–0.04)
IMM GRANULOCYTES NFR BLD AUTO: 0.4 % (ref 0–0.5)
LH SERPL-ACNC: 7.2 MIU/ML
LYMPHOCYTES # BLD AUTO: 1.3 K/UL (ref 1–4.8)
LYMPHOCYTES NFR BLD: 28 % (ref 18–48)
MCH RBC QN AUTO: 26 PG (ref 27–31)
MCHC RBC AUTO-ENTMCNC: 29.9 G/DL (ref 32–36)
MCV RBC AUTO: 87 FL (ref 82–98)
MONOCYTES # BLD AUTO: 0.4 K/UL (ref 0.3–1)
MONOCYTES NFR BLD: 8 % (ref 4–15)
NEUTROPHILS # BLD AUTO: 2.8 K/UL (ref 1.8–7.7)
NEUTROPHILS NFR BLD: 61 % (ref 38–73)
NRBC BLD-RTO: 0 /100 WBC
PLATELET # BLD AUTO: 351 K/UL (ref 150–350)
PMV BLD AUTO: 9.7 FL (ref 9.2–12.9)
RBC # BLD AUTO: 4.3 M/UL (ref 4–5.4)
T4 FREE SERPL-MCNC: 0.84 NG/DL (ref 0.71–1.51)
TSH SERPL DL<=0.005 MIU/L-ACNC: 1 UIU/ML (ref 0.4–4)
WBC # BLD AUTO: 4.61 K/UL (ref 3.9–12.7)

## 2019-04-29 PROCEDURE — 36415 COLL VENOUS BLD VENIPUNCTURE: CPT

## 2019-04-29 PROCEDURE — 84443 ASSAY THYROID STIM HORMONE: CPT

## 2019-04-29 PROCEDURE — 85025 COMPLETE CBC W/AUTO DIFF WBC: CPT

## 2019-04-29 PROCEDURE — 83002 ASSAY OF GONADOTROPIN (LH): CPT

## 2019-04-29 PROCEDURE — 84439 ASSAY OF FREE THYROXINE: CPT

## 2019-04-29 PROCEDURE — 83001 ASSAY OF GONADOTROPIN (FSH): CPT

## 2019-04-30 DIAGNOSIS — N93.8 OTHER SPECIFIED ABNORMAL UTERINE AND VAGINAL BLEEDING: Primary | ICD-10-CM

## 2019-04-30 DIAGNOSIS — D25.9 LEIOMYOMA OF UTERUS, UNSPECIFIED: ICD-10-CM

## 2019-05-03 ENCOUNTER — HOSPITAL ENCOUNTER (OUTPATIENT)
Dept: RADIOLOGY | Facility: HOSPITAL | Age: 34
Discharge: HOME | End: 2019-05-03
Attending: SPECIALIST
Payer: COMMERCIAL

## 2019-05-03 DIAGNOSIS — N93.8 DYSFUNCTIONAL UTERINE BLEEDING: ICD-10-CM

## 2019-05-03 PROCEDURE — 76856 US EXAM PELVIC COMPLETE: CPT | Mod: 26,,, | Performed by: RADIOLOGY

## 2019-05-03 PROCEDURE — 76830 TRANSVAGINAL US NON-OB: CPT | Mod: 26,,, | Performed by: RADIOLOGY

## 2019-05-03 PROCEDURE — 76830 TRANSVAGINAL US NON-OB: CPT | Mod: TC

## 2019-05-03 PROCEDURE — 76830 US PELVIS COMP WITH TRANSVAG NON-OB (XPD): ICD-10-PCS | Mod: 26,,, | Performed by: RADIOLOGY

## 2019-05-03 PROCEDURE — 76856 US PELVIS COMP WITH TRANSVAG NON-OB (XPD): ICD-10-PCS | Mod: 26,,, | Performed by: RADIOLOGY

## 2019-05-10 ENCOUNTER — PATIENT MESSAGE (OUTPATIENT)
Dept: FAMILY MEDICINE | Facility: CLINIC | Age: 34
End: 2019-05-10

## 2019-05-11 ENCOUNTER — OFFICE VISIT (OUTPATIENT)
Dept: FAMILY MEDICINE | Facility: CLINIC | Age: 34
End: 2019-05-11
Payer: COMMERCIAL

## 2019-05-11 VITALS
SYSTOLIC BLOOD PRESSURE: 128 MMHG | OXYGEN SATURATION: 100 % | DIASTOLIC BLOOD PRESSURE: 90 MMHG | WEIGHT: 184.31 LBS | HEART RATE: 88 BPM | BODY MASS INDEX: 29.62 KG/M2 | HEIGHT: 66 IN

## 2019-05-11 DIAGNOSIS — K59.09 CHRONIC CONSTIPATION: Primary | ICD-10-CM

## 2019-05-11 DIAGNOSIS — K92.89 GAS BLOAT SYNDROME: ICD-10-CM

## 2019-05-11 PROCEDURE — 3008F BODY MASS INDEX DOCD: CPT | Mod: CPTII,S$GLB,, | Performed by: FAMILY MEDICINE

## 2019-05-11 PROCEDURE — 99999 PR PBB SHADOW E&M-EST. PATIENT-LVL III: ICD-10-PCS | Mod: PBBFAC,,, | Performed by: FAMILY MEDICINE

## 2019-05-11 PROCEDURE — 99999 PR PBB SHADOW E&M-EST. PATIENT-LVL III: CPT | Mod: PBBFAC,,, | Performed by: FAMILY MEDICINE

## 2019-05-11 PROCEDURE — 99214 OFFICE O/P EST MOD 30 MIN: CPT | Mod: S$GLB,,, | Performed by: FAMILY MEDICINE

## 2019-05-11 PROCEDURE — 99214 PR OFFICE/OUTPT VISIT, EST, LEVL IV, 30-39 MIN: ICD-10-PCS | Mod: S$GLB,,, | Performed by: FAMILY MEDICINE

## 2019-05-11 PROCEDURE — 3008F PR BODY MASS INDEX (BMI) DOCUMENTED: ICD-10-PCS | Mod: CPTII,S$GLB,, | Performed by: FAMILY MEDICINE

## 2019-05-11 NOTE — PROGRESS NOTES
" Office Visit    Patient Name: Doroteo Sun    : 1985  MRN: [id number]    Subjective:  Doroteo is a 33 y.o. female who presents today for:    Bloated (x2weeks)    32 yo pt of Dr Sunshine's w/ h/o constipation, anxiety, and irregular menses(h/o unremarkable pelvic US pre GYN per patient report) here with bloating and lower abdominal discomfort for about 2 weeks. Currently on Trulance for constipation- last BM this morning was soft but small. Not on a fiber supplement. C/o abdominal discomfort but no nausea, vomiting, fever, chills. BMs have been irregular and not always "complete."     Saw GI 10/26/18 and advised to do a "clean out with golytely" then resume linzess. She was then transitioned to Trulance.     Past Medical History  Past Medical History:   Diagnosis Date    Acne     Anxiety     Chronic constipation        Past Surgical History  History reviewed. No pertinent surgical history.    Family History  Family History   Problem Relation Age of Onset    Hypertension Other     Cancer Neg Hx     Diabetes Neg Hx     Breast cancer Neg Hx     Colon cancer Neg Hx     Ovarian cancer Neg Hx     Cervical cancer Neg Hx     Endometrial cancer Neg Hx     Vaginal cancer Neg Hx     Melanoma Neg Hx        Social History  Social History     Socioeconomic History    Marital status: Single     Spouse name: Not on file    Number of children: Not on file    Years of education: Not on file    Highest education level: Not on file   Occupational History     Employer: [employer]   Social Needs    Financial resource strain: Not on file    Food insecurity:     Worry: Not on file     Inability: Not on file    Transportation needs:     Medical: Not on file     Non-medical: Not on file   Tobacco Use    Smoking status: Never Smoker    Smokeless tobacco: Never Used   Substance and Sexual Activity    Alcohol use: Yes     Alcohol/week: 0.0 oz     Comment: Socially.    Drug use: No    Sexual activity: Yes     Partners: " "Male     Birth control/protection: None   Lifestyle    Physical activity:     Days per week: Not on file     Minutes per session: Not on file    Stress: Not on file   Relationships    Social connections:     Talks on phone: Not on file     Gets together: Not on file     Attends [employer] service: Not on file     Active member of club or organization: Not on file     Attends meetings of clubs or organizations: Not on file     Relationship status: Not on file   Other Topics Concern    Are you pregnant or think you may be? Not Asked    Breast-feeding Not Asked   Social History Narrative    Not on file       Current Medications  Medications reviewed and updated.     Allergies   Review of patient's allergies indicates:   Allergen Reactions    Apple        Review of Systems (Pertinent positives)  Review of Systems   Constitutional: Negative for chills and fever.   Gastrointestinal: Positive for abdominal distention, abdominal pain and constipation. Negative for diarrhea, nausea and vomiting.       BP (!) 128/90 (BP Location: Left arm, Patient Position: Sitting, BP Method: Medium (Manual))   Pulse 88   Ht 5' 6" (1.676 m)   Wt 83.6 kg (184 lb 4.9 oz)   SpO2 100%   BMI 29.75 kg/m²     Physical Exam   Constitutional: She is oriented to person, place, and time. She appears well-developed and well-nourished. No distress.   HENT:   Head: Normocephalic and atraumatic.   Eyes: Conjunctivae are normal.   Cardiovascular: Normal rate and regular rhythm.   Pulmonary/Chest: Effort normal and breath sounds normal.   Abdominal: Soft. Bowel sounds are normal. She exhibits distension. She exhibits no mass. There is tenderness (mild) in the right lower quadrant, periumbilical area and left lower quadrant. There is no rebound and no guarding. No hernia.   Musculoskeletal: She exhibits no edema.   Neurological: She is alert and oriented to person, place, and time.   Skin: Skin is warm and dry.   Psychiatric: She has a normal mood " and affect.   Vitals reviewed.        Assessment/Plan:  Doroteo Snu is a 33 y.o. female who presents today for :    Doroteo was seen today for bloated.    Diagnoses and all orders for this visit:    Chronic constipation  -     methylcellulose (CITRUCEL, SUCROSE,) oral powder; 1 heaping tablespoon with 10 oz of water after dinner nightly & continue trulance in the mornings  -     Bifidobacterium infantis (ALIGN) 4 mg Cap; Take 1 capsule (4 mg total) by mouth daily with breakfast.    Gas bloat syndrome  -     methylcellulose (CITRUCEL, SUCROSE,) oral powder; 1 heaping tablespoon with 10 oz of water after dinner nightly & continue trulance in the mornings  -     Bifidobacterium infantis (ALIGN) 4 mg Cap; Take 1 capsule (4 mg total) by mouth daily with breakfast.            ICD-10-CM ICD-9-CM    1. Chronic constipation K59.09 564.00 methylcellulose (CITRUCEL, SUCROSE,) oral powder      Bifidobacterium infantis (ALIGN) 4 mg Cap   2. Gas bloat syndrome K92.89 569.89 methylcellulose (CITRUCEL, SUCROSE,) oral powder      Bifidobacterium infantis (ALIGN) 4 mg Cap       Patient Instructions   Trial of adding citrucel fiber supplement and align probiotic for 12 weeks then follow up with GI if no improvement        Follow up in about 3 months (around 8/3/2019) for return as needed for new concerns.

## 2019-05-11 NOTE — PATIENT INSTRUCTIONS
Trial of adding citrucel fiber supplement and align probiotic for 12 weeks then follow up with GI if no improvement

## 2019-05-20 ENCOUNTER — PATIENT MESSAGE (OUTPATIENT)
Dept: FAMILY MEDICINE | Facility: CLINIC | Age: 34
End: 2019-05-20

## 2019-05-22 ENCOUNTER — PATIENT MESSAGE (OUTPATIENT)
Dept: FAMILY MEDICINE | Facility: CLINIC | Age: 34
End: 2019-05-22

## 2019-05-24 ENCOUNTER — OFFICE VISIT (OUTPATIENT)
Dept: FAMILY MEDICINE | Facility: CLINIC | Age: 34
End: 2019-05-24
Payer: COMMERCIAL

## 2019-05-24 VITALS
SYSTOLIC BLOOD PRESSURE: 126 MMHG | HEIGHT: 66 IN | DIASTOLIC BLOOD PRESSURE: 82 MMHG | BODY MASS INDEX: 29.44 KG/M2 | TEMPERATURE: 99 F | OXYGEN SATURATION: 100 % | HEART RATE: 116 BPM | WEIGHT: 183.19 LBS

## 2019-05-24 DIAGNOSIS — R60.9 EDEMA, UNSPECIFIED TYPE: Primary | ICD-10-CM

## 2019-05-24 PROCEDURE — 99214 OFFICE O/P EST MOD 30 MIN: CPT | Mod: S$GLB,,, | Performed by: FAMILY MEDICINE

## 2019-05-24 PROCEDURE — 99999 PR PBB SHADOW E&M-EST. PATIENT-LVL III: CPT | Mod: PBBFAC,,, | Performed by: FAMILY MEDICINE

## 2019-05-24 PROCEDURE — 99999 PR PBB SHADOW E&M-EST. PATIENT-LVL III: ICD-10-PCS | Mod: PBBFAC,,, | Performed by: FAMILY MEDICINE

## 2019-05-24 PROCEDURE — 3008F PR BODY MASS INDEX (BMI) DOCUMENTED: ICD-10-PCS | Mod: CPTII,S$GLB,, | Performed by: FAMILY MEDICINE

## 2019-05-24 PROCEDURE — 99214 PR OFFICE/OUTPT VISIT, EST, LEVL IV, 30-39 MIN: ICD-10-PCS | Mod: S$GLB,,, | Performed by: FAMILY MEDICINE

## 2019-05-24 PROCEDURE — 3008F BODY MASS INDEX DOCD: CPT | Mod: CPTII,S$GLB,, | Performed by: FAMILY MEDICINE

## 2019-05-24 RX ORDER — HYDROCHLOROTHIAZIDE 12.5 MG/1
12.5 TABLET ORAL DAILY PRN
Qty: 30 TABLET | Refills: 1 | Status: SHIPPED | OUTPATIENT
Start: 2019-05-24 | End: 2020-04-20 | Stop reason: SDUPTHER

## 2019-05-24 RX ORDER — PHENTERMINE HYDROCHLORIDE 37.5 MG/1
TABLET ORAL
Refills: 0 | COMMUNITY
Start: 2019-04-01 | End: 2019-05-24

## 2019-05-24 NOTE — LETTER
May 24, 2019      Other  [address]  [place]  [place] [address]           [address]  [place] [address]  Phone: [phone]  Fax: [phone]          Patient: Doroteo Sun   MR Number: [id number]   YOB: 1985   Date of Visit: 5/24/2019       Dear Other:    Thank you for referring Doroteo Sun to me for evaluation. Attached you will find relevant portions of my assessment and plan of care.    If you have questions, please do not hesitate to call me. I look forward to following Doroteo Sun along with you.    Sincerely,    Babatunde Sunshine MD    Enclosure  CC:  No Recipients    If you would like to receive this communication electronically, please contact [email or web address] or [phone] to request more information on [employer] access.    For providers and/or their staff who would like to refer a patient to [facility], please contact us through our one-stop-shop provider referral line, Gons[facility] Franklin, at [phone].    If you feel you have received this communication in error or would no longer like to receive these types of communications, please e-mail [email or web address]

## 2019-05-24 NOTE — PROGRESS NOTES
(Portions of this note were dictated using voice recognition software and may contain dictation related errors in spelling/grammar/syntax not found on text review)    CC:   Chief Complaint   Patient presents with    Edema     States that she took a fluid pill from her boyfriend and had significant relief.        HPI: 33 y.o. female   Last visit in March of 2019 for recurrence of trochanteric bursitis.  Discussed options such as bilateral trochanteric bursa injections versus deferral of injection therapy and ibuprofen/Flexeril as needed and IT band stretching exercises.  She had opted for conservative care at that time    Chart reviewed:  Subsequently saw Dr. Wu on 05/11/2019 for concerns about bloating and lower abdominal discomfort for 2 weeks.  Was having chronic constipation symptoms.  On trulance from GI for chronic constipation.  At that visit was advised trial of probiotics, fiber.  Recent labs as below overall within normal range    Currently here for concerns about fluid retention.  She has noticed off and on swelling in her hands and feet.  Sometimes she will feel bloated.  She denies any fevers, chills, chest pain.  Sometimes slightly short of breath when going up stairs but not with walking.  She took 1 of her fiancee's hydrochlorothiazide pills and felt much better the next day.  She does not have hypertension.  She was listed to be taking spironolactone from her dermatologist but has not taken this in several months.  She also in the past had been on Adipex for weight loss but is not currently taking this.  She is taking ibuprofen for her above trochanteric bursitis issues, sometimes up to twice a day but sometimes does tries to take it at night when her hips seems to hurt more.  She has propranolol on hand for headaches but does not take this on a regular basis.    Past Medical History:   Diagnosis Date    Acne     Anxiety     Chronic constipation        No past surgical history on  file.    Family History   Problem Relation Age of Onset    Hypertension Other     Cancer Neg Hx     Diabetes Neg Hx     Breast cancer Neg Hx     Colon cancer Neg Hx     Ovarian cancer Neg Hx     Cervical cancer Neg Hx     Endometrial cancer Neg Hx     Vaginal cancer Neg Hx     Melanoma Neg Hx        Social History     Socioeconomic History    Marital status: Single     Spouse name: Not on file    Number of children: Not on file    Years of education: Not on file    Highest education level: Not on file   Occupational History     Employer: [employer]   Social Needs    Financial resource strain: Not on file    Food insecurity:     Worry: Not on file     Inability: Not on file    Transportation needs:     Medical: Not on file     Non-medical: Not on file   Tobacco Use    Smoking status: Never Smoker    Smokeless tobacco: Never Used   Substance and Sexual Activity    Alcohol use: Yes     Alcohol/week: 0.0 oz     Comment: Socially.    Drug use: No    Sexual activity: Yes     Partners: Male     Birth control/protection: None   Lifestyle    Physical activity:     Days per week: Not on file     Minutes per session: Not on file    Stress: Not on file   Relationships    Social connections:     Talks on phone: Not on file     Gets together: Not on file     Attends [employer] service: Not on file     Active member of club or organization: Not on file     Attends meetings of clubs or organizations: Not on file     Relationship status: Not on file   Other Topics Concern    Are you pregnant or think you may be? Not Asked    Breast-feeding Not Asked   Social History Narrative    Not on file     Lab Results   Component Value Date    WBC 4.61 04/29/2019    HGB 11.2 (L) 04/29/2019    HCT 37.4 04/29/2019    MCV 87 04/29/2019     (H) 04/29/2019    CHOL 162 04/26/2019    TRIG 60 04/26/2019    HDL 50 04/26/2019    ALT 10 04/26/2019    AST 18 04/26/2019    BILITOT 0.8 04/26/2019    ALKPHOS 58 04/26/2019    NA  139 04/13/2018    K 3.7 04/13/2018     04/13/2018    CREATININE 0.8 04/13/2018    CALCIUM 9.4 04/13/2018    ALBUMIN 3.4 (L) 04/26/2019    BUN 12 04/13/2018    CO2 27 04/13/2018    TSH 0.997 04/29/2019    INR 1.0 11/16/2016    HGBA1C 4.9 04/13/2018    LDLCALC 100.0 04/26/2019    GLU 87 04/13/2018       Of note cardiac testing was done in 2018, reviewed.  She had an echo showing EF of 55%, normal diastolic function  EKG at that time was normal sinus rhythm without any acute abnormality    ROS:  GENERAL: No fever, chills, fatigability or weight loss.  SKIN: No rashes, no itching.  HEAD: No headaches.  EYES: No visual changes  EARS: No ear pain or changes in hearing.  NOSE: No congestion or rhinorrhea.  MOUTH & THROAT: No hoarseness, change in voice, or sore throat.  NODES: Denies swollen glands.  CHEST:  Above.  CARDIOVASCULAR: Denies chest pain, PND, orthopnea.  ABDOMEN: No nausea, vomiting, or changes in bowel function.  URINARY: No flank pain, dysuria or hematuria.  PERIPHERAL VASCULAR:  Above.  MUSCULOSKELETAL:  Above  NEUROLOGIC: No weakness or numbness.    Vital signs reviewed  PE:   APPEARANCE: Well nourished, well developed, in no acute distress.    HEAD: Normocephalic, atraumatic.  EYES:   Conjunctivae noninjected.  NECK: Supple with no cervical lymphadenopathy.  No carotid bruits.  No thyromegaly  CHEST: Good inspiratory effort. Lungs clear to auscultation with no wheezes or crackles.  CARDIOVASCULAR: Normal S1, S2. No rubs, murmurs, or gallops.  ABDOMEN: Bowel sounds normal. Not distended. Soft. No tenderness or masses. No organomegaly.  EXTREMITIES: No edema, cyanosis, or clubbing.      IMPRESSION  1. Edema, unspecified type            PLAN  Orders Placed This Encounter   Procedures    Basic metabolic panel     Discussed differential diagnosis of her edema including NSAID related side effects given her regular ibuprofen administration, renal disease, stasis edema, dietary factors including increased  sodium.  Cardiogenic etiology is a possibility but seems less likely in this case.  Will check BMP above to assess for electrolyte and kidney function.  Had recent labs from her gyn as outlined above, mainly within normal limits.  Advise reducing sodium in diet.  Try to reduce NSAIDs.  Will give a short course of hydrochlorothiazide 12.5 mg daily since she did get relief of this.  Only take on an as-needed basis and if so make sure to increase potassium in diet during that time frame.  Also advise adequate hydration.   If any significant worsening of symptoms or development of dyspnea or significant rapid weight gain, will need further acute workup

## 2019-05-24 NOTE — PATIENT INSTRUCTIONS
"  The DASH Diet: Healthy Eating to Control Your Blood Pressure     What is the DASH diet?    DASH stands for Dietary Approaches to Stop Hypertension. It is a balanced eating plan that your family doctor might recommend to help you lower your blood pressure. The DASH diet:  Is low in salt, saturated fat, cholesterol and total fat.   Emphasizes fruits, vegetables, and fat-free or low-fat milk and milk products.   Includes whole grains, fish, poultry and nuts.   Limits the amount of red meat, sweets, added sugars and sugar-containing beverages in your diet.   Is rich in potassium, magnesium and calcium, as well as protein and fiber.     How can the DASH diet help me stay healthy?  Getting too much sodium (salt) in your diet can contribute to high blood pressure (also called hypertension). Some salt is in foods naturally, and some salt is added to food when it is processed or prepared. Following the DASH diet can help you lower your blood pressure, or prevent high blood pressure, by reducing the amount of sodium in your diet to less than 2,300 mg per day.  The fruits, vegetables and whole grains recommended in the DASH diet provide many other elements of a healthy diet, such as lycopene, beta-carotene and isoflavones. These can help protect your body against common health conditions, such as cancer, osteoporosis, stroke and diabetes. Following the DASH diet can also help reduce your risk of heart disease by lowering your low-density lipoprotein (LDL, or "bad") cholesterol level.  Following the DASH diet may drop your blood pressure by a few points in as little as 2 weeks. However, you should not stop taking your blood pressure medicine, or any other prescribed medicine, without talking to your doctor first.    What kinds of foods are included in the DASH diet?  The DASH diet is nutritionally balanced for good health. You dont need to buy any special foods or pills, or cook with any special recipes, to follow the DASH " diet. If you follow the DASH diet, you will eat about 2,000 calories each day. These calories will come from a variety of foods.    Where is sodium in my diet?  Food Serving Sodium Content   ¼ teaspoon table salt 575 mg   ½ teaspoon table salt 1,150 mg   1 teaspoon table salt 2,300 mg   1 hot dog 460 mg   1 regular fast-food hamburger 600 mg   2 ounces processed cheese 600 mg   1 tablespoon soy sauce 900 mg   1 serving frozen pizza with meat and vegetables 982 mg   8 ounces regular potato chips 1,192 mg     The DASH diet  Whole grains (6 to 8 servings a day)   Vegetables (4 to 5 servings a day)   Fruits (4 to 5 servings a day)   Low-fat or fat-free milk and milk products (2 to 3 servings a day)   Lean meats, poultry and fish (6 or fewer servings a day)   Nuts, seeds and beans (4 to 5 servings a week)   Fats and oils (2 to 3 servings a day)   Sweets, preferably low-fat or fat-free (5 or fewer a week)   Sodium (no more than 2,300 mg a day)   You can adapt the DASH diet to meet your needs. For example:  If you drink alcohol, limit yourself to 2 drinks or less per day for men and 1 drink or less per day for women.   To reduce your blood pressure even more, replace some of the carbohydrates in the DASH diet with low-fat protein and unsaturated fats.   If you need to lose weight, reduce the number of calories you eat to about 1,600 per day.   Follow a lower-sodium version (no more than 1,500 mg daily) if you are 40 years of age or older, you are  or you already have high blood pressure.     How can I change my eating habits?  Dont be discouraged if following the DASH diet is difficult at first. Start with small, achievable goals. The following ideas can help you make healthy changes:  Pay attention to your current eating habits. Make a list of everything you eat for 2 or 3 days. Compare this list to the DASH diet recommendations above and see what changes you need to make in your diet.   Make one change  "at a time. For example, start by choosing lower-fat versions of your favorite foods or adding more whole grains to your meals.   Learn what makes a serving for each type of food. For example, 1 serving equals 1 slice of bread, 8 ounces of milk, 1 cup of raw vegetables or 1/2 cup of cooked vegetables. For more serving sizes, go to the [facility] site. Dont have a measuring cup? One serving (3 ounces) of meat or poultry is about the size of a deck of cards. One serving (1/2 cup) of rice or potato looks like half a baseball, and a serving of cheese is about the size of 4 stacked dice.   If eating more fruits and vegetables gives you gas, bloating or diarrhea, increase these foods slowly. You can also talk to your family doctor about taking over-the-counter medicines to reduce these symptoms until your body adjusts.   Get more exercise. Physical activity helps lower your blood pressure and can help you lose more weight.   Use salt-free seasonings, such as spices and herbs, to add flavor to your recipes and reduce or eliminate salt.   Include as many fresh and unprocessed foods as possible. Cut back on frozen dinners, packaged mixes, canned soups and bottled salad dressings, which are often high in sodium.   When buying canned, frozen or processed foods, check nutrition labels for the amount of sodium, sugar and saturated fat. Look for the phrases "no salt added," "sodium-free," "low sodium" or "very low sodium" on food packages. Choose foods with monounsaturated and polyunsaturated fats.   Steam, grill, poach, roast or stir-christy foods. Use low-sodium broth or water instead of butter or oil for sautéing.   When you eat at a restaurant, ask how foods are prepared. Ask if your order can be made without added salt. Dont add salty condiments, such as ketchup, mustard, pickles or sauces, to your food.   Other Organizations  Centers for Disease Control and Prevention   National Heart, Lung, and Blood [place]   Written by " [email or web address] editorial staff  Reviewed/Updated: 02/11  Created: 08/09         Potassium-Rich Foods  The normal adult diet usually contains 2,000 mg to 4,000 mg of potassium per day. More potassium is needed when you lose too much potassium from your body. This can happen if you have diarrhea or vomiting. It can also happen if you take a medicine to make you urinate more (diuretic). To increase the amount of potassium in your diet, include these high-potassium foods.     [The (*) indicates foods highest in potassium.]    Vegetables    Artichokes. Cooked 1/2 cup, 200 mg to 300 mg*  Asparagus. Cooked 1/2 cup, 200 mg to 300 mg  Beans. White, red, hamilton cooked 1/2 cup, 300 mg to 500 mg*  Beets. Cooked 1/2 cup, 200 mg to 300 mg  Broccoli. Cooked or raw 1 cup, 200 mg to 500 mg*  [place] sprouts. Cooked 1/2 cup, 200 mg to 300 mg  Cabbage. Raw 1 cup, 100 mg to 200 mg  Carrots. Raw or cooked 1/2 cup, 100 mg to 200 mg  Celery. Raw 1 cup, 200 mg to 300 mg  Lima beans. Fresh or frozen 1/2 cup, 300 mg to 500 mg*   Mushrooms. Raw or cooked 1/2 cup, 100 mg to 300 mg  Peas. Cooked 1/2 cup, 150 mg to 250 mg   Potatoes. Baked 1 medium, 500 mg to 900 mg*   Spinach. Cooked 1 cup, 800 mg to 900 mg*   Spinach. Raw 2 cups, 300 mg to 400 mg *  Squash, winter. Fresh, frozen, or cooked 1/2 cup, 200 mg to 400 mg   Tomato. Fresh 1 medium, 200 mg to 300 mg   Tomato juice. Canned 1/2 cup, 200 mg to 300 mg     Fruits    Apple juice. Unsweetened 1 cup, 200 mg to 300 mg   Apricots. Canned 1/2 cup, 200 mg to 300 mg   Apricots. Dried 4 pieces, 100 mg to 200 mg   Avocado. Raw 1/2 cup, 300 mg to 400 mg*  Banana. Fresh 1 small, 300 mg to 400 mg*   Cantaloupe. Fresh 1 cup diced, 300 mg to 400 mg*   Grape juice. Unsweetened 1 cup, 200 mg to 300 mg   Honeydew melon. Fresh 1 cup diced, 300 mg to 400 mg*   Orange. Fresh 1 medium, 200 mg to 300 mg    Orange juice. Unsweetened, fresh or frozen 1/2 cup, 200 mg to 300 mg  Pineapple juice. Unsweetened 1 cup,  300 mg to 400 mg   Prune juice. Unsweetened 1/2 cup, 300 mg to 400 mg*   Prunes. Dried 5 pieces, 300 mg to 400 mg*   Strawberries. Fresh or frozen 1 cup, 200 mg to 300 mg      Meat    Red meat. Cooked 3 ounces, 100 mg to 300 mg   Seafood  Cod, flounder, halibut. Cooked 3 ounces, 100 mg to 300 mg*  [place]. Cooked, 3 ounces 300 mg to 400 mg*   Scallops. Cooked 3 ounces, 200 mg to 300 mg*  Shrimp. Cooked 3/4 cup, 100 mg to 200 mg   Tuna. Fresh or canned 3/4 cup, 200 mg to 500 mg   Date Last Reviewed: 10/1/2016  © 1343-3221 [employer]. [address] [place] [address], [place] [address]. All rights reserved. This information is not intended as a substitute for professional medical care. Always follow your healthcare professional's instructions

## 2019-05-27 ENCOUNTER — PATIENT MESSAGE (OUTPATIENT)
Dept: FAMILY MEDICINE | Facility: CLINIC | Age: 34
End: 2019-05-27

## 2019-05-27 ENCOUNTER — LAB VISIT (OUTPATIENT)
Dept: LAB | Facility: HOSPITAL | Age: 34
End: 2019-05-27
Attending: FAMILY MEDICINE
Payer: COMMERCIAL

## 2019-05-27 DIAGNOSIS — R60.9 EDEMA, UNSPECIFIED TYPE: ICD-10-CM

## 2019-05-27 LAB
ANION GAP SERPL CALC-SCNC: 8 MMOL/L (ref 8–16)
BUN SERPL-MCNC: 19 MG/DL (ref 6–20)
CALCIUM SERPL-MCNC: 9.8 MG/DL (ref 8.7–10.5)
CHLORIDE SERPL-SCNC: 101 MMOL/L (ref 95–110)
CO2 SERPL-SCNC: 28 MMOL/L (ref 23–29)
CREAT SERPL-MCNC: 0.8 MG/DL (ref 0.5–1.4)
EST. GFR  (AFRICAN AMERICAN): >60 ML/MIN/1.73 M^2
EST. GFR  (NON AFRICAN AMERICAN): >60 ML/MIN/1.73 M^2
GLUCOSE SERPL-MCNC: 91 MG/DL (ref 70–110)
POTASSIUM SERPL-SCNC: 4.1 MMOL/L (ref 3.5–5.1)
SODIUM SERPL-SCNC: 137 MMOL/L (ref 136–145)

## 2019-05-27 PROCEDURE — 36415 COLL VENOUS BLD VENIPUNCTURE: CPT

## 2019-05-27 PROCEDURE — 80048 BASIC METABOLIC PNL TOTAL CA: CPT

## 2019-05-27 NOTE — TELEPHONE ENCOUNTER
Dear Doroteo Sun    Your recent labs were reviewed and released to your account. Your kidney function and electrolyte tests were all within normal limits.    Babatunde Sunshine MD

## 2019-05-28 ENCOUNTER — PATIENT MESSAGE (OUTPATIENT)
Dept: DERMATOLOGY | Facility: CLINIC | Age: 34
End: 2019-05-28

## 2019-05-28 DIAGNOSIS — Z79.899 ENCOUNTER FOR LONG-TERM (CURRENT) DRUG USE: ICD-10-CM

## 2019-05-28 DIAGNOSIS — L70.0 ACNE VULGARIS: Primary | ICD-10-CM

## 2019-05-28 RX ORDER — ISOTRETINOIN 40 MG/1
40 CAPSULE ORAL 2 TIMES DAILY
Qty: 60 CAPSULE | Refills: 0 | Status: SHIPPED | OUTPATIENT
Start: 2019-05-28 | End: 2019-06-25 | Stop reason: SDUPTHER

## 2019-05-28 RX ORDER — ISOTRETINOIN 40 MG/1
CAPSULE ORAL
Qty: 60 CAPSULE | Refills: 0 | Status: SHIPPED | OUTPATIENT
Start: 2019-05-28 | End: 2019-05-28

## 2019-05-29 ENCOUNTER — TELEPHONE (OUTPATIENT)
Dept: PHARMACY | Facility: CLINIC | Age: 34
End: 2019-05-29

## 2019-05-29 DIAGNOSIS — G44.219 EPISODIC TENSION-TYPE HEADACHE, NOT INTRACTABLE: ICD-10-CM

## 2019-05-29 DIAGNOSIS — R03.0 ELEVATED BP WITHOUT DIAGNOSIS OF HYPERTENSION: ICD-10-CM

## 2019-05-29 RX ORDER — PROPRANOLOL HYDROCHLORIDE 10 MG/1
10 TABLET ORAL 2 TIMES DAILY
Qty: 60 TABLET | Refills: 0 | Status: SHIPPED | OUTPATIENT
Start: 2019-05-29 | End: 2019-09-18 | Stop reason: SDUPTHER

## 2019-06-05 ENCOUNTER — PATIENT MESSAGE (OUTPATIENT)
Dept: FAMILY MEDICINE | Facility: CLINIC | Age: 34
End: 2019-06-05

## 2019-06-19 ENCOUNTER — PATIENT MESSAGE (OUTPATIENT)
Dept: DERMATOLOGY | Facility: CLINIC | Age: 34
End: 2019-06-19

## 2019-06-25 ENCOUNTER — OFFICE VISIT (OUTPATIENT)
Dept: DERMATOLOGY | Facility: CLINIC | Age: 34
End: 2019-06-25
Payer: COMMERCIAL

## 2019-06-25 ENCOUNTER — LAB VISIT (OUTPATIENT)
Dept: LAB | Facility: HOSPITAL | Age: 34
End: 2019-06-25
Payer: COMMERCIAL

## 2019-06-25 DIAGNOSIS — Z79.899 ENCOUNTER FOR LONG-TERM (CURRENT) DRUG USE: Primary | ICD-10-CM

## 2019-06-25 DIAGNOSIS — Z79.899 ENCOUNTER FOR LONG-TERM (CURRENT) DRUG USE: ICD-10-CM

## 2019-06-25 DIAGNOSIS — L70.0 ACNE VULGARIS: ICD-10-CM

## 2019-06-25 DIAGNOSIS — L30.8 OTHER ECZEMA: ICD-10-CM

## 2019-06-25 LAB — B-HCG UR QL: NEGATIVE

## 2019-06-25 PROCEDURE — 99999 PR PBB SHADOW E&M-EST. PATIENT-LVL II: ICD-10-PCS | Mod: PBBFAC,,, | Performed by: NURSE PRACTITIONER

## 2019-06-25 PROCEDURE — 99214 PR OFFICE/OUTPT VISIT, EST, LEVL IV, 30-39 MIN: ICD-10-PCS | Mod: S$GLB,,, | Performed by: NURSE PRACTITIONER

## 2019-06-25 PROCEDURE — 99214 OFFICE O/P EST MOD 30 MIN: CPT | Mod: S$GLB,,, | Performed by: NURSE PRACTITIONER

## 2019-06-25 PROCEDURE — 99999 PR PBB SHADOW E&M-EST. PATIENT-LVL II: CPT | Mod: PBBFAC,,, | Performed by: NURSE PRACTITIONER

## 2019-06-25 PROCEDURE — 81025 URINE PREGNANCY TEST: CPT

## 2019-06-25 RX ORDER — ISOTRETINOIN 40 MG/1
CAPSULE ORAL
Qty: 60 CAPSULE | Refills: 0 | Status: SHIPPED | OUTPATIENT
Start: 2019-06-25 | End: 2019-07-23 | Stop reason: SDUPTHER

## 2019-06-25 RX ORDER — FLUOCINONIDE 0.5 MG/G
CREAM TOPICAL
Qty: 30 G | Refills: 1 | Status: SHIPPED | OUTPATIENT
Start: 2019-06-25 | End: 2021-01-06

## 2019-06-25 RX ORDER — ISOTRETINOIN 40 MG/1
CAPSULE ORAL
Qty: 60 CAPSULE | Refills: 0 | Status: SHIPPED | OUTPATIENT
Start: 2019-06-25 | End: 2019-06-25 | Stop reason: SDUPTHER

## 2019-06-25 NOTE — PATIENT INSTRUCTIONS
ACCUTANE COUNSELING      · If you are a female, you are obligated  to use 2 forms of birth control  · You are not allowed to donate blood while taking Accutane and for 1 month after  · If you experience severe continuous headaches with nausea/vomiting and/or light sensitivity please go to the emergency room.  · Elective surgeries including dental procedures are not recommended while on therapy.  · Tetracycline antibiotics (including minocycline and doxycycline) should not be taken while on Accutane.  · It is recommended that you wait to have any laser surgery until 6months after completion of your Accutane course.  · Let any physician participating in your health care know that you are on Accutane.  · Do not share your medicine with anyone  · You may experience increase skin sensitivity if getting any hair bearing areas waxed.  · A low fat, low cholesterol diet and minimal alcohol intake are recommended whole on Accutane.  · You will experience increase sun sensitivity while on Accutane-sun protection including a hat, sunscreen (Elta MD or Neutrogena ultra sheer dry touch SPF55, and protective clothing are encouraged.  · There is increase susceptibility to bacterial (such as Staph) and viral infections (such as herpes simplex) while on this medication.  · Do not use any other oral or topical acne medications unless cleared by your doctor.  · We recommend Amnesteem as the generic alternative to the brand name Accutane.  · Take the medicine with food for best results.  · Common side effects include:  · Dry lips- do not use medicated lip balms such as blistex or carmex. Vaseline petroleum is best  · Dry skin-Luke warm baths, mild soap such as Cetaphil, moisturizing creams such as Cerave, Aveeno or Cetaphil                                                                          If you have any questions or concerns, please contact your healthcare provider    [address] [place] [address], [place] [address]/  [phone] FAX/ [email or web address]

## 2019-06-25 NOTE — PROGRESS NOTES
Subjective:       Patient ID:  Doroteo Sun is a 33 y.o. female who presents for   Chief Complaint   Patient presents with    Acne     follow-up     Acne  - Follow-up  Symptom course: improving (last seen 4/25/19)  Currently using: end of 1st month of accutane (2nd course) taking Myorisan 80mg/day.  Affected locations: face  SIGNS AND SYMPTOMS F/U: new flare to bilateral cheeks.  Severity: mild        Review of Systems   HENT: Negative for nosebleeds and headaches.    Gastrointestinal: Negative for nausea, vomiting, abdominal pain and diarrhea.   Genitourinary: Negative for irregular periods (on ocp).   Musculoskeletal: Negative for myalgias and arthralgias.   Skin: Positive for dry skin (vanicream moisturizer) and dry lips (aquaphor or vaseline). Negative for itching, rash, sun sensitivity, daily sunscreen use, activity-related sunscreen use and recent sunburn.   Neurological: Negative for headaches.   Psychiatric/Behavioral: Negative for depressed mood.        Objective:    Physical Exam   Constitutional: She appears well-developed and well-nourished. No distress.   Neurological: She is alert and oriented to person, place, and time. She is not disoriented.   Psychiatric: She has a normal mood and affect.   Skin:   Areas Examined (abnormalities noted in diagram):   Head / Face Inspection Performed  Neck Inspection Performed  Chest / Axilla Inspection Performed  Back Inspection Performed  RUE Inspected  LUE Inspection Performed                   Diagram Legend     Erythematous scaling macule/papule c/w actinic keratosis       Vascular papule c/w angioma      Pigmented verrucoid papule/plaque c/w seborrheic keratosis      Yellow umbilicated papule c/w sebaceous hyperplasia      Irregularly shaped tan macule c/w lentigo     1-2 mm smooth white papules consistent with Milia      Movable subcutaneous cyst with punctum c/w epidermal inclusion cyst      Subcutaneous movable cyst c/w pilar cyst      Firm pink to  brown papule c/w dermatofibroma      Pedunculated fleshy papule(s) c/w skin tag(s)      Evenly pigmented macule c/w junctional nevus     Mildly variegated pigmented, slightly irregular-bordered macule c/w mildly atypical nevus      Flesh colored to evenly pigmented papule c/w intradermal nevus       Pink pearly papule/plaque c/w basal cell carcinoma      Erythematous hyperkeratotic cursted plaque c/w SCC      Surgical scar with no sign of skin cancer recurrence      Open and closed comedones      Inflammatory papules and pustules      Verrucoid papule consistent consistent with wart     Erythematous eczematous patches and plaques     Dystrophic onycholytic nail with subungual debris c/w onychomycosis     Umbilicated papule    Erythematous-base heme-crusted tan verrucoid plaque consistent with inflamed seborrheic keratosis     Erythematous Silvery Scaling Plaque c/w Psoriasis     See annotation      Assessment / Plan:        Encounter for long-term (current) drug use  -     Pregnancy, urine rapid; Future  -     CBC auto differential; Future; Expected date: 06/25/2019  -     Hepatic function panel; Future; Expected date: 06/25/2019  -     Lipid panel; Future; Expected date: 06/25/2019  -     ISOtretinoin (ACCUTANE) 40 MG capsule; 1 tab po BID for goal dose of 80 mg/day  Dispense: 60 capsule; Refill: 0    Acne vulgaris  pt weighs #180lbs (#81kg)  Goal dose (2nd course); 8,100mg- 12,150mg (100mg/kg-150mg/kg)  -     ISOtretinoin (ACCUTANE) 40 MG capsule; 1 tab po BID for goal dose of 80 mg/day  Dispense: 60 capsule; Refill: 0    Continue 2 forms of contraception  Continue spf q am  Continue washing face bid  Continue aquaphor     Other eczema dermatitis 2/2 po accutane course  -     fluocinonide 0.05% (LIDEX) 0.05 % cream; Apply to the affected area twice daily as needed for redness, irritation  Dispense: 30 g; Refill: 1    Good skin care regimen discussed including limiting to one bath or shower/day, using lukewarm  water with mild soap and moisturizing cream to skin 1 - 2x/day. Brochure was provided and reviewed with patient.    Dry skin and itchy rashes are a common side effect of Isotretinoin therapy. Discussed good skin care techniques such as limiting to one lukewarm water bath or shower per day, use of a moisturizing soap and moisturizing cream.                 Follow up in about 1 month (around 7/25/2019).

## 2019-06-26 ENCOUNTER — TELEPHONE (OUTPATIENT)
Dept: DERMATOLOGY | Facility: CLINIC | Age: 34
End: 2019-06-26

## 2019-06-26 NOTE — TELEPHONE ENCOUNTER
Called the pharmacy to let them know the pt has been confirmed in ipledge.         ----- Message from Diana Moody sent at 6/25/2019 12:04 PM CDT -----  Contact: [facility]   Molly - pt Needs Advice    Reason for call: pharmacy is calling to speak with the nurse the np needs to confirm the pt in the I pledge system pt is aware that it hasn't been done yet         Communication Preference: can you please call pharmacy at [phone]    Additional Information: none    LENIN

## 2019-07-01 ENCOUNTER — PATIENT MESSAGE (OUTPATIENT)
Dept: OBSTETRICS AND GYNECOLOGY | Facility: CLINIC | Age: 34
End: 2019-07-01

## 2019-07-01 ENCOUNTER — TELEPHONE (OUTPATIENT)
Dept: PHARMACY | Facility: CLINIC | Age: 34
End: 2019-07-01

## 2019-07-02 DIAGNOSIS — N76.0 ACUTE VAGINITIS: Primary | ICD-10-CM

## 2019-07-02 RX ORDER — METRONIDAZOLE 500 MG/1
500 TABLET ORAL 2 TIMES DAILY
Qty: 14 TABLET | Refills: 0 | Status: SHIPPED | OUTPATIENT
Start: 2019-07-02 | End: 2019-07-09

## 2019-07-22 ENCOUNTER — LAB VISIT (OUTPATIENT)
Dept: LAB | Facility: HOSPITAL | Age: 34
End: 2019-07-22
Payer: COMMERCIAL

## 2019-07-22 DIAGNOSIS — Z79.899 ENCOUNTER FOR LONG-TERM (CURRENT) DRUG USE: ICD-10-CM

## 2019-07-22 LAB
ALBUMIN SERPL BCP-MCNC: 3.5 G/DL (ref 3.5–5.2)
ALP SERPL-CCNC: 71 U/L (ref 55–135)
ALT SERPL W/O P-5'-P-CCNC: 17 U/L (ref 10–44)
AST SERPL-CCNC: 17 U/L (ref 10–40)
BASOPHILS # BLD AUTO: 0.02 K/UL (ref 0–0.2)
BASOPHILS NFR BLD: 0.4 % (ref 0–1.9)
BILIRUB DIRECT SERPL-MCNC: 0.4 MG/DL (ref 0.1–0.3)
BILIRUB SERPL-MCNC: 1 MG/DL (ref 0.1–1)
CHOLEST SERPL-MCNC: 156 MG/DL (ref 120–199)
CHOLEST/HDLC SERPL: 3 {RATIO} (ref 2–5)
DIFFERENTIAL METHOD: ABNORMAL
EOSINOPHIL # BLD AUTO: 0.1 K/UL (ref 0–0.5)
EOSINOPHIL NFR BLD: 1.5 % (ref 0–8)
ERYTHROCYTE [DISTWIDTH] IN BLOOD BY AUTOMATED COUNT: 13.4 % (ref 11.5–14.5)
HCT VFR BLD AUTO: 38.3 % (ref 37–48.5)
HDLC SERPL-MCNC: 52 MG/DL (ref 40–75)
HDLC SERPL: 33.3 % (ref 20–50)
HGB BLD-MCNC: 11.5 G/DL (ref 12–16)
IMM GRANULOCYTES # BLD AUTO: 0.02 K/UL (ref 0–0.04)
IMM GRANULOCYTES NFR BLD AUTO: 0.4 % (ref 0–0.5)
LDLC SERPL CALC-MCNC: 91.6 MG/DL (ref 63–159)
LYMPHOCYTES # BLD AUTO: 1.3 K/UL (ref 1–4.8)
LYMPHOCYTES NFR BLD: 27.6 % (ref 18–48)
MCH RBC QN AUTO: 25.1 PG (ref 27–31)
MCHC RBC AUTO-ENTMCNC: 30 G/DL (ref 32–36)
MCV RBC AUTO: 84 FL (ref 82–98)
MONOCYTES # BLD AUTO: 0.4 K/UL (ref 0.3–1)
MONOCYTES NFR BLD: 8.9 % (ref 4–15)
NEUTROPHILS # BLD AUTO: 3 K/UL (ref 1.8–7.7)
NEUTROPHILS NFR BLD: 61.2 % (ref 38–73)
NONHDLC SERPL-MCNC: 104 MG/DL
NRBC BLD-RTO: 0 /100 WBC
PLATELET # BLD AUTO: 315 K/UL (ref 150–350)
PMV BLD AUTO: 9.3 FL (ref 9.2–12.9)
PROT SERPL-MCNC: 7.4 G/DL (ref 6–8.4)
RBC # BLD AUTO: 4.58 M/UL (ref 4–5.4)
TRIGL SERPL-MCNC: 62 MG/DL (ref 30–150)
WBC # BLD AUTO: 4.82 K/UL (ref 3.9–12.7)

## 2019-07-22 PROCEDURE — 85025 COMPLETE CBC W/AUTO DIFF WBC: CPT

## 2019-07-22 PROCEDURE — 80076 HEPATIC FUNCTION PANEL: CPT

## 2019-07-22 PROCEDURE — 80061 LIPID PANEL: CPT

## 2019-07-22 PROCEDURE — 36415 COLL VENOUS BLD VENIPUNCTURE: CPT

## 2019-07-23 ENCOUNTER — OFFICE VISIT (OUTPATIENT)
Dept: DERMATOLOGY | Facility: CLINIC | Age: 34
End: 2019-07-23
Payer: COMMERCIAL

## 2019-07-23 DIAGNOSIS — L30.8 OTHER ECZEMA: ICD-10-CM

## 2019-07-23 DIAGNOSIS — L70.0 ACNE VULGARIS: ICD-10-CM

## 2019-07-23 DIAGNOSIS — Z79.899 ENCOUNTER FOR LONG-TERM (CURRENT) DRUG USE: Primary | ICD-10-CM

## 2019-07-23 PROCEDURE — 99214 OFFICE O/P EST MOD 30 MIN: CPT | Mod: S$GLB,,, | Performed by: NURSE PRACTITIONER

## 2019-07-23 PROCEDURE — 99999 PR PBB SHADOW E&M-EST. PATIENT-LVL II: ICD-10-PCS | Mod: PBBFAC,,, | Performed by: NURSE PRACTITIONER

## 2019-07-23 PROCEDURE — 99999 PR PBB SHADOW E&M-EST. PATIENT-LVL II: CPT | Mod: PBBFAC,,, | Performed by: NURSE PRACTITIONER

## 2019-07-23 PROCEDURE — 99214 PR OFFICE/OUTPT VISIT, EST, LEVL IV, 30-39 MIN: ICD-10-PCS | Mod: S$GLB,,, | Performed by: NURSE PRACTITIONER

## 2019-07-23 RX ORDER — ISOTRETINOIN 40 MG/1
CAPSULE ORAL
Qty: 60 CAPSULE | Refills: 0 | Status: SHIPPED | OUTPATIENT
Start: 2019-07-23 | End: 2019-08-22 | Stop reason: SDUPTHER

## 2019-07-23 NOTE — PROGRESS NOTES
Subjective:       Patient ID:  Doroteo Sun is a 33 y.o. female who presents for   Chief Complaint   Patient presents with    Acne     Pt here today for a acne follow up tx with accutane 80 mg. End of 2nd month. Labs done 7/22/19.       Acne  - Follow-up  Symptom course: improving (last seen 6/25/19)  Currently using: end of 2nd month of accutane (2nd course) taking Myorisan 80mg/day.  Affected locations: face  SIGNS AND SYMPTOMS F/U: new breakout/bump to left cheek.  Severity: mild        Review of Systems   HENT: Negative for nosebleeds and headaches.    Gastrointestinal: Negative for nausea, vomiting, abdominal pain and diarrhea.   Genitourinary: Negative for irregular periods (on ocp).   Musculoskeletal: Negative for myalgias and arthralgias.   Skin: Positive for dry skin (vanicream), daily sunscreen use, activity-related sunscreen use and dry lips (aquaphor ). Negative for itching, rash, sun sensitivity and recent sunburn.   Neurological: Negative for headaches.   Psychiatric/Behavioral: Negative for depressed mood.        Objective:    Physical Exam   Constitutional: She appears well-developed and well-nourished. No distress.   Neurological: She is alert and oriented to person, place, and time. She is not disoriented.   Psychiatric: She has a normal mood and affect.   Skin:   Areas Examined (abnormalities noted in diagram):   Head / Face Inspection Performed  Neck Inspection Performed  Chest / Axilla Inspection Performed  Back Inspection Performed  RUE Inspected  LUE Inspection Performed                   Diagram Legend     Erythematous scaling macule/papule c/w actinic keratosis       Vascular papule c/w angioma      Pigmented verrucoid papule/plaque c/w seborrheic keratosis      Yellow umbilicated papule c/w sebaceous hyperplasia      Irregularly shaped tan macule c/w lentigo     1-2 mm smooth white papules consistent with Milia      Movable subcutaneous cyst with punctum c/w epidermal inclusion cyst       Subcutaneous movable cyst c/w pilar cyst      Firm pink to brown papule c/w dermatofibroma      Pedunculated fleshy papule(s) c/w skin tag(s)      Evenly pigmented macule c/w junctional nevus     Mildly variegated pigmented, slightly irregular-bordered macule c/w mildly atypical nevus      Flesh colored to evenly pigmented papule c/w intradermal nevus       Pink pearly papule/plaque c/w basal cell carcinoma      Erythematous hyperkeratotic cursted plaque c/w SCC      Surgical scar with no sign of skin cancer recurrence      Open and closed comedones      Inflammatory papules and pustules      Verrucoid papule consistent consistent with wart     Erythematous eczematous patches and plaques     Dystrophic onycholytic nail with subungual debris c/w onychomycosis     Umbilicated papule    Erythematous-base heme-crusted tan verrucoid plaque consistent with inflamed seborrheic keratosis     Erythematous Silvery Scaling Plaque c/w Psoriasis     See annotation    lastupt negative      Lab Results   Component Value Date    CHOL 156 07/22/2019    CHOL 162 04/26/2019    CHOL 183 04/13/2018     Lab Results   Component Value Date    HDL 52 07/22/2019    HDL 50 04/26/2019    HDL 52 04/13/2018     Lab Results   Component Value Date    LDLCALC 91.6 07/22/2019    LDLCALC 100.0 04/26/2019    LDLCALC 111.2 04/13/2018     Lab Results   Component Value Date    TRIG 62 07/22/2019    TRIG 60 04/26/2019    TRIG 99 04/13/2018     Lab Results   Component Value Date    CHOLHDL 33.3 07/22/2019    CHOLHDL 30.9 04/26/2019    CHOLHDL 28.4 04/13/2018     Lab Results   Component Value Date    WBC 4.82 07/22/2019    HGB 11.5 (L) 07/22/2019    HCT 38.3 07/22/2019    MCV 84 07/22/2019     07/22/2019       Assessment / Plan:        Encounter for long-term (current) drug use  -     Pregnancy, urine rapid; Future    Acne vulgaris  End of 2nd month (2nd course)  Current total #4800 mg  Goal dose (#100-150mg/kg) #8100-12,150mg  Continue two  forms of contraception  Continue SPF q am  Continue Aquaphor or lips    Other eczema  D/c hot showers  Continue Good skin care regimen discussed including limiting to one bath or shower/day, using lukewarm water with mild soap and moisturizing cream to skin 1 - 2x/day. Brochure was provided and reviewed with patient.           Follow up in about 1 month (around 8/23/2019).

## 2019-07-23 NOTE — PATIENT INSTRUCTIONS
ACCUTANE COUNSELING      · If you are a female, you are obligated  to use 2 forms of birth control  · You are not allowed to donate blood while taking Accutane and for 1 month after  · If you experience severe continuous headaches with nausea/vomiting and/or light sensitivity please go to the emergency room.  · Elective surgeries including dental procedures are not recommended while on therapy.  · Tetracycline antibiotics (including minocycline and doxycycline) should not be taken while on Accutane.  · It is recommended that you wait to have any laser surgery until 6months after completion of your Accutane course.  · Let any physician participating in your health care know that you are on Accutane.  · Do not share your medicine with anyone  · You may experience increase skin sensitivity if getting any hair bearing areas waxed.  · A low fat, low cholesterol diet and minimal alcohol intake are recommended whole on Accutane.  · You will experience increase sun sensitivity while on Accutane-sun protection including a hat, sunscreen (Elta MD or Neutrogena ultra sheer dry touch SPF55, and protective clothing are encouraged.  · There is increase susceptibility to bacterial (such as Staph) and viral infections (such as herpes simplex) while on this medication.  · Do not use any other oral or topical acne medications unless cleared by your doctor.  · We recommend Amnesteem as the generic alternative to the brand name Accutane.  · Take the medicine with food for best results.  · Common side effects include:  · Dry lips- do not use medicated lip balms such as blistex or carmex. Vaseline petroleum is best  · Dry skin-Luke warm baths, mild soap such as Cetaphil, moisturizing creams such as Cerave, Aveeno or Cetaphil                                                                          If you have any questions or concerns, please contact your healthcare provider    [address] [place] [address], [place] [address]/  [phone] FAX/ [email or web address]

## 2019-08-20 ENCOUNTER — LAB VISIT (OUTPATIENT)
Dept: LAB | Facility: HOSPITAL | Age: 34
End: 2019-08-20
Payer: COMMERCIAL

## 2019-08-20 DIAGNOSIS — Z79.899 ENCOUNTER FOR LONG-TERM (CURRENT) DRUG USE: ICD-10-CM

## 2019-08-20 LAB — B-HCG UR QL: NEGATIVE

## 2019-08-20 PROCEDURE — 81025 URINE PREGNANCY TEST: CPT

## 2019-08-22 ENCOUNTER — OFFICE VISIT (OUTPATIENT)
Dept: DERMATOLOGY | Facility: CLINIC | Age: 34
End: 2019-08-22
Payer: COMMERCIAL

## 2019-08-22 DIAGNOSIS — L70.0 ACNE VULGARIS: ICD-10-CM

## 2019-08-22 DIAGNOSIS — Z79.899 ENCOUNTER FOR LONG-TERM (CURRENT) DRUG USE: Primary | ICD-10-CM

## 2019-08-22 DIAGNOSIS — L30.8 OTHER ECZEMA: ICD-10-CM

## 2019-08-22 PROCEDURE — 99999 PR PBB SHADOW E&M-EST. PATIENT-LVL II: CPT | Mod: PBBFAC,,, | Performed by: NURSE PRACTITIONER

## 2019-08-22 PROCEDURE — 99214 PR OFFICE/OUTPT VISIT, EST, LEVL IV, 30-39 MIN: ICD-10-PCS | Mod: S$GLB,,, | Performed by: NURSE PRACTITIONER

## 2019-08-22 PROCEDURE — 99999 PR PBB SHADOW E&M-EST. PATIENT-LVL II: ICD-10-PCS | Mod: PBBFAC,,, | Performed by: NURSE PRACTITIONER

## 2019-08-22 PROCEDURE — 99214 OFFICE O/P EST MOD 30 MIN: CPT | Mod: S$GLB,,, | Performed by: NURSE PRACTITIONER

## 2019-08-22 RX ORDER — ISOTRETINOIN 40 MG/1
CAPSULE ORAL
Qty: 60 CAPSULE | Refills: 0 | Status: SHIPPED | OUTPATIENT
Start: 2019-08-22 | End: 2019-09-26 | Stop reason: SDUPTHER

## 2019-08-22 NOTE — PATIENT INSTRUCTIONS
ACCUTANE COUNSELING      · If you are a female, you are obligated  to use 2 forms of birth control  · You are not allowed to donate blood while taking Accutane and for 1 month after  · If you experience severe continuous headaches with nausea/vomiting and/or light sensitivity please go to the emergency room.  · Elective surgeries including dental procedures are not recommended while on therapy.  · Tetracycline antibiotics (including minocycline and doxycycline) should not be taken while on Accutane.  · It is recommended that you wait to have any laser surgery until 6months after completion of your Accutane course.  · Let any physician participating in your health care know that you are on Accutane.  · Do not share your medicine with anyone  · You may experience increase skin sensitivity if getting any hair bearing areas waxed.  · A low fat, low cholesterol diet and minimal alcohol intake are recommended whole on Accutane.  · You will experience increase sun sensitivity while on Accutane-sun protection including a hat, sunscreen (Elta MD or Neutrogena ultra sheer dry touch SPF55, and protective clothing are encouraged.  · There is increase susceptibility to bacterial (such as Staph) and viral infections (such as herpes simplex) while on this medication.  · Do not use any other oral or topical acne medications unless cleared by your doctor.  · We recommend Amnesteem as the generic alternative to the brand name Accutane.  · Take the medicine with food for best results.  · Common side effects include:  · Dry lips- do not use medicated lip balms such as blistex or carmex. Vaseline petroleum is best  · Dry skin-Luke warm baths, mild soap such as Cetaphil, moisturizing creams such as Cerave, Aveeno or Cetaphil                                                                          If you have any questions or concerns, please contact your healthcare provider    [address] [place] [address], [place] [address]/  [phone] FAX/ [email or web address]

## 2019-08-22 NOTE — PROGRESS NOTES
Subjective:       Patient ID:  Doroteo Sun is a 33 y.o. female who presents for   Chief Complaint   Patient presents with    Follow-up     better     Pt here today for a acne follow up tx with accutane 80 mg. End of 2nd month. Labs done 7/22/19.       Acne  - Follow-up  Symptom course: improving (last seen 6/25/19)  Currently using: end of 3rd month of accutane (2nd course) taking Myorisan 80mg/day.  Affected locations: face  SIGNS AND SYMPTOMS F/U: new breakout/bump to left cheek.  Severity: mild        Review of Systems   HENT: Negative for nosebleeds and headaches.    Gastrointestinal: Negative for nausea, vomiting, abdominal pain and diarrhea.   Genitourinary: Negative for irregular periods (on ocp).   Musculoskeletal: Negative for myalgias and arthralgias.   Skin: Positive for dry skin (vanicream), daily sunscreen use, activity-related sunscreen use and dry lips (aquaphor ). Negative for itching, rash, sun sensitivity and recent sunburn.   Neurological: Negative for headaches.   Psychiatric/Behavioral: Negative for depressed mood.        Objective:    Physical Exam   Constitutional: She appears well-developed and well-nourished. No distress.   Neurological: She is alert and oriented to person, place, and time. She is not disoriented.   Psychiatric: She has a normal mood and affect.   Skin:   Areas Examined (abnormalities noted in diagram):   Head / Face Inspection Performed  Neck Inspection Performed  Chest / Axilla Inspection Performed  Back Inspection Performed  RUE Inspected  LUE Inspection Performed                   Diagram Legend     Erythematous scaling macule/papule c/w actinic keratosis       Vascular papule c/w angioma      Pigmented verrucoid papule/plaque c/w seborrheic keratosis      Yellow umbilicated papule c/w sebaceous hyperplasia      Irregularly shaped tan macule c/w lentigo     1-2 mm smooth white papules consistent with Milia      Movable subcutaneous cyst with punctum c/w epidermal  inclusion cyst      Subcutaneous movable cyst c/w pilar cyst      Firm pink to brown papule c/w dermatofibroma      Pedunculated fleshy papule(s) c/w skin tag(s)      Evenly pigmented macule c/w junctional nevus     Mildly variegated pigmented, slightly irregular-bordered macule c/w mildly atypical nevus      Flesh colored to evenly pigmented papule c/w intradermal nevus       Pink pearly papule/plaque c/w basal cell carcinoma      Erythematous hyperkeratotic cursted plaque c/w SCC      Surgical scar with no sign of skin cancer recurrence      Open and closed comedones      Inflammatory papules and pustules      Verrucoid papule consistent consistent with wart     Erythematous eczematous patches and plaques     Dystrophic onycholytic nail with subungual debris c/w onychomycosis     Umbilicated papule    Erythematous-base heme-crusted tan verrucoid plaque consistent with inflamed seborrheic keratosis     Erythematous Silvery Scaling Plaque c/w Psoriasis     See annotation    Last upt 8/20/19, negative    Assessment / Plan:        Encounter for long-term (current) drug use  -     Pregnancy, urine rapid; Future  -     ISOtretinoin (ACCUTANE) 40 MG capsule; Take 1 tablet by mouth two times daily for goal dose of 80 mg/day  Dispense: 60 capsule; Refill: 0    Acne vulgaris  -     ISOtretinoin (ACCUTANE) 40 MG capsule; Take 1 tablet by mouth two times daily for goal dose of 80 mg/day  Dispense: 60 capsule; Refill: 0    #83kg  End of 3rd month (2nd course)  80mg/day  current total dose #7200mg  Goal dose 12,150mg-57477 (150mg-220mg/kg)    Dry, cracked lips are a common side effect of Isotretinoin. It is recommended that patient discontinue all lip balms and use only Vaseline petroleum jelly applied frequently to lips.    Continue two forms of contraception    Eczema 2/2 isotretinoin course- improved  Continue Good skin care regimen discussed including limiting to one bath or shower/day, using lukewarm water with mild soap  and moisturizing cream to skin 1 - 2x/day. Brochure was provided and reviewed with patient.             Follow up in about 1 month (around 9/22/2019).

## 2019-09-18 DIAGNOSIS — G44.219 EPISODIC TENSION-TYPE HEADACHE, NOT INTRACTABLE: ICD-10-CM

## 2019-09-18 DIAGNOSIS — R03.0 ELEVATED BP WITHOUT DIAGNOSIS OF HYPERTENSION: ICD-10-CM

## 2019-09-18 RX ORDER — PROPRANOLOL HYDROCHLORIDE 10 MG/1
10 TABLET ORAL 2 TIMES DAILY
Qty: 60 TABLET | Refills: 11 | Status: SHIPPED | OUTPATIENT
Start: 2019-09-18 | End: 2019-09-19 | Stop reason: SDUPTHER

## 2019-09-19 DIAGNOSIS — R03.0 ELEVATED BP WITHOUT DIAGNOSIS OF HYPERTENSION: ICD-10-CM

## 2019-09-19 DIAGNOSIS — G44.219 EPISODIC TENSION-TYPE HEADACHE, NOT INTRACTABLE: ICD-10-CM

## 2019-09-19 RX ORDER — PROPRANOLOL HYDROCHLORIDE 10 MG/1
10 TABLET ORAL 2 TIMES DAILY
Qty: 60 TABLET | Refills: 11 | Status: SHIPPED | OUTPATIENT
Start: 2019-09-19 | End: 2020-10-13 | Stop reason: SDUPTHER

## 2019-09-19 RX ORDER — GABAPENTIN 100 MG/1
100 CAPSULE ORAL NIGHTLY
Qty: 30 CAPSULE | Refills: 1 | Status: SHIPPED | OUTPATIENT
Start: 2019-09-19 | End: 2020-06-05 | Stop reason: SDUPTHER

## 2019-09-24 ENCOUNTER — LAB VISIT (OUTPATIENT)
Dept: LAB | Facility: HOSPITAL | Age: 34
End: 2019-09-24
Payer: COMMERCIAL

## 2019-09-24 DIAGNOSIS — Z79.899 ENCOUNTER FOR LONG-TERM (CURRENT) DRUG USE: ICD-10-CM

## 2019-09-24 LAB — B-HCG UR QL: NEGATIVE

## 2019-09-24 PROCEDURE — 81025 URINE PREGNANCY TEST: CPT

## 2019-09-25 ENCOUNTER — PATIENT OUTREACH (OUTPATIENT)
Dept: ADMINISTRATIVE | Facility: OTHER | Age: 34
End: 2019-09-25

## 2019-09-26 ENCOUNTER — OFFICE VISIT (OUTPATIENT)
Dept: DERMATOLOGY | Facility: CLINIC | Age: 34
End: 2019-09-26
Payer: COMMERCIAL

## 2019-09-26 DIAGNOSIS — Z79.899 ENCOUNTER FOR LONG-TERM (CURRENT) DRUG USE: Primary | ICD-10-CM

## 2019-09-26 DIAGNOSIS — L70.0 ACNE VULGARIS: ICD-10-CM

## 2019-09-26 PROCEDURE — 99999 PR PBB SHADOW E&M-EST. PATIENT-LVL II: CPT | Mod: PBBFAC,,, | Performed by: NURSE PRACTITIONER

## 2019-09-26 PROCEDURE — 99214 OFFICE O/P EST MOD 30 MIN: CPT | Mod: S$GLB,,, | Performed by: NURSE PRACTITIONER

## 2019-09-26 PROCEDURE — 99214 PR OFFICE/OUTPT VISIT, EST, LEVL IV, 30-39 MIN: ICD-10-PCS | Mod: S$GLB,,, | Performed by: NURSE PRACTITIONER

## 2019-09-26 PROCEDURE — 99999 PR PBB SHADOW E&M-EST. PATIENT-LVL II: ICD-10-PCS | Mod: PBBFAC,,, | Performed by: NURSE PRACTITIONER

## 2019-09-26 RX ORDER — ISOTRETINOIN 40 MG/1
CAPSULE ORAL
Qty: 60 CAPSULE | Refills: 0 | Status: SHIPPED | OUTPATIENT
Start: 2019-09-26 | End: 2019-10-24 | Stop reason: SDUPTHER

## 2019-09-26 NOTE — PATIENT INSTRUCTIONS
ACCUTANE COUNSELING      · If you are a female, you are obligated  to use 2 forms of birth control  · You are not allowed to donate blood while taking Accutane and for 1 month after  · If you experience severe continuous headaches with nausea/vomiting and/or light sensitivity please go to the emergency room.  · Elective surgeries including dental procedures are not recommended while on therapy.  · Tetracycline antibiotics (including minocycline and doxycycline) should not be taken while on Accutane.  · It is recommended that you wait to have any laser surgery until 6months after completion of your Accutane course.  · Let any physician participating in your health care know that you are on Accutane.  · Do not share your medicine with anyone  · You may experience increase skin sensitivity if getting any hair bearing areas waxed.  · A low fat, low cholesterol diet and minimal alcohol intake are recommended whole on Accutane.  · You will experience increase sun sensitivity while on Accutane-sun protection including a hat, sunscreen (Elta MD or Neutrogena ultra sheer dry touch SPF55, and protective clothing are encouraged.  · There is increase susceptibility to bacterial (such as Staph) and viral infections (such as herpes simplex) while on this medication.  · Do not use any other oral or topical acne medications unless cleared by your doctor.  · We recommend Amnesteem as the generic alternative to the brand name Accutane.  · Take the medicine with food for best results.  · Common side effects include:  · Dry lips- do not use medicated lip balms such as blistex or carmex. Vaseline petroleum is best  · Dry skin-Luke warm baths, mild soap such as Cetaphil, moisturizing creams such as Cerave, Aveeno or Cetaphil                                                                          If you have any questions or concerns, please contact your healthcare provider    [address] [place] [address], [place] [address]/  [phone] FAX/ [email or web address]

## 2019-09-26 NOTE — PROGRESS NOTES
Subjective:       Patient ID:  Doroteo Sun is a 34 y.o. female who presents for   Chief Complaint   Patient presents with    Acne     follow up           Acne  - Follow-up  Symptom course: improving (last seen 8/22/19)  Currently using: end of 4th month of accutane (2nd course) taking Myorisan 80mg/day.  Affected locations: face  Signs / symptoms: dryness (denies any new breakouts)  Severity: mild and mild to moderate        Review of Systems   HENT: Negative for nosebleeds and headaches.    Gastrointestinal: Negative for nausea, vomiting, abdominal pain and diarrhea.   Genitourinary: Negative for irregular periods (on ocp).   Musculoskeletal: Negative for myalgias and arthralgias (occasional hip discomfort, self resolves).   Skin: Positive for dry skin (vanicream), daily sunscreen use, activity-related sunscreen use and dry lips (aquaphor ). Negative for itching, rash, sun sensitivity and recent sunburn.   Neurological: Negative for headaches.   Psychiatric/Behavioral: Negative for depressed mood.        Objective:    Physical Exam   Constitutional: She appears well-developed and well-nourished. No distress.   Neurological: She is alert and oriented to person, place, and time. She is not disoriented.   Psychiatric: She has a normal mood and affect.   Skin:   Areas Examined (abnormalities noted in diagram):   Head / Face Inspection Performed  Neck Inspection Performed  Chest / Axilla Inspection Performed  Back Inspection Performed  RUE Inspected  LUE Inspection Performed                   Diagram Legend     Erythematous scaling macule/papule c/w actinic keratosis       Vascular papule c/w angioma      Pigmented verrucoid papule/plaque c/w seborrheic keratosis      Yellow umbilicated papule c/w sebaceous hyperplasia      Irregularly shaped tan macule c/w lentigo     1-2 mm smooth white papules consistent with Milia      Movable subcutaneous cyst with punctum c/w epidermal inclusion cyst      Subcutaneous  movable cyst c/w pilar cyst      Firm pink to brown papule c/w dermatofibroma      Pedunculated fleshy papule(s) c/w skin tag(s)      Evenly pigmented macule c/w junctional nevus     Mildly variegated pigmented, slightly irregular-bordered macule c/w mildly atypical nevus      Flesh colored to evenly pigmented papule c/w intradermal nevus       Pink pearly papule/plaque c/w basal cell carcinoma      Erythematous hyperkeratotic cursted plaque c/w SCC      Surgical scar with no sign of skin cancer recurrence      Open and closed comedones      Inflammatory papules and pustules      Verrucoid papule consistent consistent with wart     Erythematous eczematous patches and plaques     Dystrophic onycholytic nail with subungual debris c/w onychomycosis     Umbilicated papule    Erythematous-base heme-crusted tan verrucoid plaque consistent with inflamed seborrheic keratosis     Erythematous Silvery Scaling Plaque c/w Psoriasis     See annotation    Last UPT 9/24/19: Negative    Assessment / Plan:        Encounter for long-term (current) drug use  -     ISOtretinoin (ACCUTANE) 40 MG capsule; Take 1 tablet by mouth two times daily for goal dose of 80 mg/day  Dispense: 60 capsule; Refill: 0  -     Pregnancy, urine rapid; Future    Acne vulgaris  #83kg  End of 4th month (2nd course)  80mg/day  current total dose #9600mg  Goal dose 12,150mg-03406 (150mg-220mg/kg)  -     ISOtretinoin (ACCUTANE) 40 MG capsule; Take 1 tablet by mouth two times daily for goal dose of 80 mg/day  Dispense: 60 capsule; Refill: 0  -     Pregnancy, urine rapid; Future    Continue two forms of contraception: OCP & MLC  Continue aquaphor  Continue washing face BID  Continue SPF q day    Dermatitis 2/2 PO isotretinoin course  Good skin care regimen discussed including limiting to one bath or shower/day, using lukewarm water with mild soap and moisturizing cream to skin 1 - 2x/day. Brochure was provided and reviewed with patient.             Follow up in  about 1 month (around 10/26/2019).

## 2019-09-27 ENCOUNTER — PATIENT MESSAGE (OUTPATIENT)
Dept: DERMATOLOGY | Facility: CLINIC | Age: 34
End: 2019-09-27

## 2019-10-23 ENCOUNTER — PATIENT OUTREACH (OUTPATIENT)
Dept: ADMINISTRATIVE | Facility: OTHER | Age: 34
End: 2019-10-23

## 2019-10-24 ENCOUNTER — OFFICE VISIT (OUTPATIENT)
Dept: DERMATOLOGY | Facility: CLINIC | Age: 34
End: 2019-10-24
Payer: COMMERCIAL

## 2019-10-24 ENCOUNTER — LAB VISIT (OUTPATIENT)
Dept: LAB | Facility: HOSPITAL | Age: 34
End: 2019-10-24
Payer: COMMERCIAL

## 2019-10-24 DIAGNOSIS — Z79.899 ENCOUNTER FOR LONG-TERM (CURRENT) DRUG USE: ICD-10-CM

## 2019-10-24 DIAGNOSIS — Z79.899 ENCOUNTER FOR LONG-TERM (CURRENT) DRUG USE: Primary | ICD-10-CM

## 2019-10-24 DIAGNOSIS — L70.0 ACNE VULGARIS: ICD-10-CM

## 2019-10-24 DIAGNOSIS — K13.0 CHEILITIS: ICD-10-CM

## 2019-10-24 LAB — B-HCG UR QL: NEGATIVE

## 2019-10-24 PROCEDURE — 99999 PR PBB SHADOW E&M-EST. PATIENT-LVL II: ICD-10-PCS | Mod: PBBFAC,,, | Performed by: NURSE PRACTITIONER

## 2019-10-24 PROCEDURE — 99214 PR OFFICE/OUTPT VISIT, EST, LEVL IV, 30-39 MIN: ICD-10-PCS | Mod: S$GLB,,, | Performed by: NURSE PRACTITIONER

## 2019-10-24 PROCEDURE — 99214 OFFICE O/P EST MOD 30 MIN: CPT | Mod: S$GLB,,, | Performed by: NURSE PRACTITIONER

## 2019-10-24 PROCEDURE — 99999 PR PBB SHADOW E&M-EST. PATIENT-LVL II: CPT | Mod: PBBFAC,,, | Performed by: NURSE PRACTITIONER

## 2019-10-24 PROCEDURE — 81025 URINE PREGNANCY TEST: CPT

## 2019-10-24 RX ORDER — TRIAMCINOLONE ACETONIDE 0.25 MG/G
OINTMENT TOPICAL
Qty: 30 G | Refills: 1 | Status: SHIPPED | OUTPATIENT
Start: 2019-10-24 | End: 2021-01-06

## 2019-10-24 RX ORDER — ISOTRETINOIN 40 MG/1
CAPSULE ORAL
Qty: 60 CAPSULE | Refills: 0 | Status: SHIPPED | OUTPATIENT
Start: 2019-10-24 | End: 2020-06-05

## 2019-10-24 NOTE — PROGRESS NOTES
Subjective:       Patient ID:  Doroteo Snu is a 34 y.o. female who presents for   Chief Complaint   Patient presents with    Acne     accutane follow-up           Acne  - Follow-up  Symptom course: improving (last seen 8/22/19)  Currently using: end of 5th month of accutane (2nd course) taking Myorisan 80mg/day.  Affected locations: face  Signs / symptoms: dryness (denies any new breakouts)  Severity: mild and mild to moderate        Review of Systems   HENT: Negative for nosebleeds and headaches.    Gastrointestinal: Negative for nausea, vomiting, abdominal pain and diarrhea.   Genitourinary: Negative for irregular periods (on ocp).   Musculoskeletal: Negative for myalgias and arthralgias (occasional hip discomfort, self resolves).   Skin: Positive for dry skin (vanicream), daily sunscreen use, activity-related sunscreen use and dry lips (aquaphor ). Negative for itching, rash, sun sensitivity and recent sunburn.   Neurological: Negative for headaches.   Psychiatric/Behavioral: Negative for depressed mood.        Objective:    Physical Exam   Constitutional: She appears well-developed and well-nourished. No distress.   Neurological: She is alert and oriented to person, place, and time. She is not disoriented.   Psychiatric: She has a normal mood and affect.   Skin:   Areas Examined (abnormalities noted in diagram):   Head / Face Inspection Performed  Neck Inspection Performed  Chest / Axilla Inspection Performed  Back Inspection Performed  RUE Inspected  LUE Inspection Performed                   Diagram Legend     Erythematous scaling macule/papule c/w actinic keratosis       Vascular papule c/w angioma      Pigmented verrucoid papule/plaque c/w seborrheic keratosis      Yellow umbilicated papule c/w sebaceous hyperplasia      Irregularly shaped tan macule c/w lentigo     1-2 mm smooth white papules consistent with Milia      Movable subcutaneous cyst with punctum c/w epidermal inclusion cyst       Subcutaneous movable cyst c/w pilar cyst      Firm pink to brown papule c/w dermatofibroma      Pedunculated fleshy papule(s) c/w skin tag(s)      Evenly pigmented macule c/w junctional nevus     Mildly variegated pigmented, slightly irregular-bordered macule c/w mildly atypical nevus      Flesh colored to evenly pigmented papule c/w intradermal nevus       Pink pearly papule/plaque c/w basal cell carcinoma      Erythematous hyperkeratotic cursted plaque c/w SCC      Surgical scar with no sign of skin cancer recurrence      Open and closed comedones      Inflammatory papules and pustules      Verrucoid papule consistent consistent with wart     Erythematous eczematous patches and plaques     Dystrophic onycholytic nail with subungual debris c/w onychomycosis     Umbilicated papule    Erythematous-base heme-crusted tan verrucoid plaque consistent with inflamed seborrheic keratosis     Erythematous Silvery Scaling Plaque c/w Psoriasis     See annotation      Assessment / Plan:        Encounter for long-term (current) drug use  -     ISOtretinoin (ACCUTANE) 40 MG capsule; Take 1 tablet by mouth two times daily for goal dose of 80 mg/day  Dispense: 60 capsule; Refill: 0  -     Pregnancy, urine rapid; Future    Acne vulgaris  #83kg  End of 5th month (2nd course)  80mg/day  current total dose #02528ge  Goal dose 12,150mg-76188 (150mg-220mg/kg)  -     ISOtretinoin (ACCUTANE) 40 MG capsule; Take 1 tablet by mouth two times daily for goal dose of 80 mg/day  Dispense: 60 capsule; Refill: 0    Continue good skin care regimen. Encourage use of topical steroid for dry scaling patches currently noted on chest today  Continue OCP & MLC  Continue SPF    Cheilitis  -     triamcinolone acetonide 0.025% (KENALOG) 0.025 % Oint; AAA BID prn fissure, cracking lips  Dispense: 30 g; Refill: 1    Continue aquaphor q hr           Follow up in about 1 month (around 11/24/2019).

## 2019-10-24 NOTE — PATIENT INSTRUCTIONS
ACCUTANE COUNSELING      · If you are a female, you are obligated  to use 2 forms of birth control  · You are not allowed to donate blood while taking Accutane and for 1 month after  · If you experience severe continuous headaches with nausea/vomiting and/or light sensitivity please go to the emergency room.  · Elective surgeries including dental procedures are not recommended while on therapy.  · Tetracycline antibiotics (including minocycline and doxycycline) should not be taken while on Accutane.  · It is recommended that you wait to have any laser surgery until 6months after completion of your Accutane course.  · Let any physician participating in your health care know that you are on Accutane.  · Do not share your medicine with anyone  · You may experience increase skin sensitivity if getting any hair bearing areas waxed.  · A low fat, low cholesterol diet and minimal alcohol intake are recommended whole on Accutane.  · You will experience increase sun sensitivity while on Accutane-sun protection including a hat, sunscreen (Elta MD or Neutrogena ultra sheer dry touch SPF55, and protective clothing are encouraged.  · There is increase susceptibility to bacterial (such as Staph) and viral infections (such as herpes simplex) while on this medication.  · Do not use any other oral or topical acne medications unless cleared by your doctor.  · We recommend Amnesteem as the generic alternative to the brand name Accutane.  · Take the medicine with food for best results.  · Common side effects include:  · Dry lips- do not use medicated lip balms such as blistex or carmex. Vaseline petroleum is best  · Dry skin-Luke warm baths, mild soap such as Cetaphil, moisturizing creams such as Cerave, Aveeno or Cetaphil                                                                          If you have any questions or concerns, please contact your healthcare provider    [address] [place] [address], [place] [address]/  [phone] FAX/ [email or web address]

## 2019-11-12 ENCOUNTER — OFFICE VISIT (OUTPATIENT)
Dept: INTERNAL MEDICINE | Facility: CLINIC | Age: 34
End: 2019-11-12
Payer: COMMERCIAL

## 2019-11-12 ENCOUNTER — TELEPHONE (OUTPATIENT)
Dept: INTERNAL MEDICINE | Facility: CLINIC | Age: 34
End: 2019-11-12

## 2019-11-12 VITALS
HEIGHT: 66 IN | SYSTOLIC BLOOD PRESSURE: 116 MMHG | DIASTOLIC BLOOD PRESSURE: 64 MMHG | OXYGEN SATURATION: 99 % | TEMPERATURE: 98 F | WEIGHT: 179.44 LBS | HEART RATE: 99 BPM | BODY MASS INDEX: 28.84 KG/M2

## 2019-11-12 DIAGNOSIS — J10.1 INFLUENZA A: ICD-10-CM

## 2019-11-12 DIAGNOSIS — M79.605 LEFT LEG PAIN: ICD-10-CM

## 2019-11-12 DIAGNOSIS — R50.9 FEVER, UNSPECIFIED FEVER CAUSE: Primary | ICD-10-CM

## 2019-11-12 DIAGNOSIS — S86.912A MUSCLE STRAIN OF LEFT LOWER LEG, INITIAL ENCOUNTER: ICD-10-CM

## 2019-11-12 DIAGNOSIS — J06.9 UPPER RESPIRATORY TRACT INFECTION, UNSPECIFIED TYPE: ICD-10-CM

## 2019-11-12 LAB
INFLUENZA A, MOLECULAR: POSITIVE
INFLUENZA B, MOLECULAR: NEGATIVE
SPECIMEN SOURCE: ABNORMAL

## 2019-11-12 PROCEDURE — 3008F PR BODY MASS INDEX (BMI) DOCUMENTED: ICD-10-PCS | Mod: CPTII,S$GLB,, | Performed by: PHYSICIAN ASSISTANT

## 2019-11-12 PROCEDURE — 99999 PR PBB SHADOW E&M-EST. PATIENT-LVL IV: CPT | Mod: PBBFAC,,, | Performed by: PHYSICIAN ASSISTANT

## 2019-11-12 PROCEDURE — 99213 PR OFFICE/OUTPT VISIT, EST, LEVL III, 20-29 MIN: ICD-10-PCS | Mod: S$GLB,,, | Performed by: PHYSICIAN ASSISTANT

## 2019-11-12 PROCEDURE — 99213 OFFICE O/P EST LOW 20 MIN: CPT | Mod: S$GLB,,, | Performed by: PHYSICIAN ASSISTANT

## 2019-11-12 PROCEDURE — 99999 PR PBB SHADOW E&M-EST. PATIENT-LVL IV: ICD-10-PCS | Mod: PBBFAC,,, | Performed by: PHYSICIAN ASSISTANT

## 2019-11-12 PROCEDURE — 3008F BODY MASS INDEX DOCD: CPT | Mod: CPTII,S$GLB,, | Performed by: PHYSICIAN ASSISTANT

## 2019-11-12 PROCEDURE — 87502 INFLUENZA DNA AMP PROBE: CPT

## 2019-11-12 RX ORDER — IBUPROFEN 800 MG/1
800 TABLET ORAL 3 TIMES DAILY PRN
Qty: 60 TABLET | Refills: 2 | OUTPATIENT
Start: 2019-11-12 | End: 2021-02-15

## 2019-11-12 RX ORDER — OSELTAMIVIR PHOSPHATE 75 MG/1
75 CAPSULE ORAL 2 TIMES DAILY
Qty: 10 CAPSULE | Refills: 0 | Status: SHIPPED | OUTPATIENT
Start: 2019-11-12 | End: 2019-11-17

## 2019-11-12 RX ORDER — PROMETHAZINE HYDROCHLORIDE AND DEXTROMETHORPHAN HYDROBROMIDE 6.25; 15 MG/5ML; MG/5ML
5 SYRUP ORAL EVERY 6 HOURS PRN
Qty: 240 ML | Refills: 0 | Status: SHIPPED | OUTPATIENT
Start: 2019-11-12 | End: 2019-11-22

## 2019-11-12 NOTE — PROGRESS NOTES
Subjective:       Patient ID: Doroteo Sun is a 34 y.o. female.    Chief Complaint: Cough; Sore Throat; Nasal Congestion; Fatigue; and Headache (Slight)    Patient presents for evaluation of URI symptoms after her son was diagnosed with the flu two days ago. Four days ago she noticed a sore throat that progressed over the next few days to include runny/stuffy nose, cough, sneezing, body aches and shortness of breath. Her cough is keeping her up at night. She has tried dayquil and mucinex that have not provided significant relief. She endorses some nausea but no vomiting. She denies sinus pain or pressure as well as ear pain. She received a flu shot about 10 days ago.     Review of Systems   Constitutional: Negative for activity change, appetite change, chills, fatigue and fever.   HENT: Positive for postnasal drip, rhinorrhea, sneezing and sore throat. Negative for congestion, ear pain, sinus pressure and sinus pain.    Eyes: Negative for pain and visual disturbance.   Respiratory: Positive for cough and shortness of breath. Negative for chest tightness.    Cardiovascular: Negative for chest pain and palpitations.   Gastrointestinal: Positive for nausea. Negative for abdominal distention, abdominal pain, constipation, diarrhea and vomiting.   Genitourinary: Negative for difficulty urinating, dysuria, flank pain and urgency.   Musculoskeletal: Positive for arthralgias and myalgias. Negative for back pain.   Skin: Negative for color change and rash.   Neurological: Negative for dizziness, weakness, light-headedness, numbness and headaches.       Objective:      Physical Exam   Constitutional: She is oriented to person, place, and time. She appears well-developed and well-nourished. No distress.   HENT:   Head: Normocephalic and atraumatic.   Right Ear: External ear normal.   Left Ear: External ear normal.   Nose: Nose normal.   Mouth/Throat: Oropharynx is clear and moist. No oropharyngeal exudate.   Eyes: Pupils  are equal, round, and reactive to light. Conjunctivae and EOM are normal.   Neck: Normal range of motion. Neck supple.   Cardiovascular: Normal rate, regular rhythm and normal heart sounds.   No murmur heard.  Pulmonary/Chest: Effort normal and breath sounds normal. No respiratory distress. She has no wheezes.   Significant coughing during exam.   Abdominal: Soft. Bowel sounds are normal. She exhibits no distension.   Musculoskeletal: Normal range of motion. She exhibits no edema or deformity.   Neurological: She is alert and oriented to person, place, and time. Coordination normal.   Skin: Skin is warm and dry. No rash noted.       Assessment:       1. Fever, unspecified fever cause    2. Upper respiratory tract infection, unspecified type    3. Influenza A        Plan:       Doroteo was seen today for cough, sore throat, nasal congestion, fatigue and headache.    Diagnoses and all orders for this visit:    Fever, unspecified fever cause  -     Influenza A & B by Molecular    Upper respiratory tract infection, unspecified type  -     promethazine-dextromethorphan (PROMETHAZINE-DM) 6.25-15 mg/5 mL Syrp; Take 5 mLs by mouth every 6 (six) hours as needed.    Influenza A  -     oseltamivir (TAMIFLU) 75 MG capsule; Take 1 capsule (75 mg total) by mouth 2 (two) times daily. for 5 days    Will call with results of flu swab. Tamiflu to be sent if flu test is positive.  Encouraged rest and hydration.

## 2019-11-16 ENCOUNTER — PATIENT MESSAGE (OUTPATIENT)
Dept: INTERNAL MEDICINE | Facility: CLINIC | Age: 34
End: 2019-11-16

## 2019-11-23 ENCOUNTER — PATIENT OUTREACH (OUTPATIENT)
Dept: ADMINISTRATIVE | Facility: OTHER | Age: 34
End: 2019-11-23

## 2019-11-25 ENCOUNTER — OFFICE VISIT (OUTPATIENT)
Dept: DERMATOLOGY | Facility: CLINIC | Age: 34
End: 2019-11-25
Payer: COMMERCIAL

## 2019-11-25 DIAGNOSIS — L70.0 ACNE VULGARIS: ICD-10-CM

## 2019-11-25 DIAGNOSIS — Z79.899 ENCOUNTER FOR LONG-TERM (CURRENT) DRUG USE: Primary | ICD-10-CM

## 2019-11-25 PROCEDURE — 99214 PR OFFICE/OUTPT VISIT, EST, LEVL IV, 30-39 MIN: ICD-10-PCS | Mod: S$GLB,,, | Performed by: NURSE PRACTITIONER

## 2019-11-25 PROCEDURE — 99999 PR PBB SHADOW E&M-EST. PATIENT-LVL II: CPT | Mod: PBBFAC,,, | Performed by: NURSE PRACTITIONER

## 2019-11-25 PROCEDURE — 99214 OFFICE O/P EST MOD 30 MIN: CPT | Mod: S$GLB,,, | Performed by: NURSE PRACTITIONER

## 2019-11-25 PROCEDURE — 99999 PR PBB SHADOW E&M-EST. PATIENT-LVL II: ICD-10-PCS | Mod: PBBFAC,,, | Performed by: NURSE PRACTITIONER

## 2019-11-25 NOTE — PATIENT INSTRUCTIONS
ACCUTANE COUNSELING      · If you are a female, you are obligated  to use 2 forms of birth control  · You are not allowed to donate blood while taking Accutane and for 1 month after  · If you experience severe continuous headaches with nausea/vomiting and/or light sensitivity please go to the emergency room.  · Elective surgeries including dental procedures are not recommended while on therapy.  · Tetracycline antibiotics (including minocycline and doxycycline) should not be taken while on Accutane.  · It is recommended that you wait to have any laser surgery until 6months after completion of your Accutane course.  · Let any physician participating in your health care know that you are on Accutane.  · Do not share your medicine with anyone  · You may experience increase skin sensitivity if getting any hair bearing areas waxed.  · A low fat, low cholesterol diet and minimal alcohol intake are recommended whole on Accutane.  · You will experience increase sun sensitivity while on Accutane-sun protection including a hat, sunscreen (Elta MD or Neutrogena ultra sheer dry touch SPF55, and protective clothing are encouraged.  · There is increase susceptibility to bacterial (such as Staph) and viral infections (such as herpes simplex) while on this medication.  · Do not use any other oral or topical acne medications unless cleared by your doctor.  · We recommend Amnesteem as the generic alternative to the brand name Accutane.  · Take the medicine with food for best results.  · Common side effects include:  · Dry lips- do not use medicated lip balms such as blistex or carmex. Vaseline petroleum is best  · Dry skin-Luke warm baths, mild soap such as Cetaphil, moisturizing creams such as Cerave, Aveeno or Cetaphil                                                                          If you have any questions or concerns, please contact your healthcare provider    [address] [place] [address], [place] [address]/  [phone] FAX/ [email or web address]

## 2019-11-25 NOTE — PROGRESS NOTES
Subjective:       Patient ID:  Doroteo Sun is a 34 y.o. female who presents for   Chief Complaint   Patient presents with    Acne     follow-up      Acne  - Follow-up  Symptom Course: last seen 10/24/19.  Currently using: end of 6th month (2nd course) Myorisan 80mg/day.  Affected locations: face  SIGNS AND SYMPTOMS F/U: denies any new breakouts.  Severity: mild to moderate        Review of Systems   HENT: Negative for nosebleeds and headaches.    Gastrointestinal: Negative for nausea, vomiting, abdominal pain and diarrhea.   Genitourinary: Negative for irregular periods (on ocp).   Musculoskeletal: Negative for myalgias and arthralgias (occasional hip discomfort, self resolves).   Skin: Positive for dry skin (vanicream), daily sunscreen use, activity-related sunscreen use and dry lips (aquaphor ). Negative for itching, rash, sun sensitivity and recent sunburn.   Neurological: Negative for headaches.   Psychiatric/Behavioral: Negative for depressed mood.        Objective:    Physical Exam   Constitutional: She appears well-developed and well-nourished. No distress.   Neurological: She is alert and oriented to person, place, and time. She is not disoriented.   Psychiatric: She has a normal mood and affect.   Skin:   Areas Examined (abnormalities noted in diagram):   Head / Face Inspection Performed  Neck Inspection Performed  Chest / Axilla Inspection Performed  Back Inspection Performed  RUE Inspected  LUE Inspection Performed                   Diagram Legend     Erythematous scaling macule/papule c/w actinic keratosis       Vascular papule c/w angioma      Pigmented verrucoid papule/plaque c/w seborrheic keratosis      Yellow umbilicated papule c/w sebaceous hyperplasia      Irregularly shaped tan macule c/w lentigo     1-2 mm smooth white papules consistent with Milia      Movable subcutaneous cyst with punctum c/w epidermal inclusion cyst      Subcutaneous movable cyst c/w pilar cyst      Firm pink to  brown papule c/w dermatofibroma      Pedunculated fleshy papule(s) c/w skin tag(s)      Evenly pigmented macule c/w junctional nevus     Mildly variegated pigmented, slightly irregular-bordered macule c/w mildly atypical nevus      Flesh colored to evenly pigmented papule c/w intradermal nevus       Pink pearly papule/plaque c/w basal cell carcinoma      Erythematous hyperkeratotic cursted plaque c/w SCC      Surgical scar with no sign of skin cancer recurrence      Open and closed comedones      Inflammatory papules and pustules      Verrucoid papule consistent consistent with wart     Erythematous eczematous patches and plaques     Dystrophic onycholytic nail with subungual debris c/w onychomycosis     Umbilicated papule    Erythematous-base heme-crusted tan verrucoid plaque consistent with inflamed seborrheic keratosis     Erythematous Silvery Scaling Plaque c/w Psoriasis     See annotation      Assessment / Plan:        Encounter for long-term (current) drug use  -     Pregnancy, urine rapid; Future    Acne vulgaris  #83kg  End of 6th month (2nd course)  80mg/day  Current total dose #14,400mg  Goal dose #12,150mg-51666 (150mg-220mg/kg)    No breakouts x2 months, ok to discontinue drug once done w/ this current box (pt has x5more days).   Must continue 2 forms of birth control for at least 1 month after discontinuation.   UPT today.  Continue Aquphor and vaseline q hr for lips  Continue good skin care regimen.    Discussed possibly extracting comedone to right cheek at 2 month follow up.             Follow up in about 2 months (around 1/25/2020).

## 2019-11-26 ENCOUNTER — LAB VISIT (OUTPATIENT)
Dept: LAB | Facility: HOSPITAL | Age: 34
End: 2019-11-26
Payer: COMMERCIAL

## 2019-11-26 DIAGNOSIS — L70.0 ACNE VULGARIS: ICD-10-CM

## 2019-11-26 DIAGNOSIS — Z79.899 ENCOUNTER FOR LONG-TERM (CURRENT) DRUG USE: ICD-10-CM

## 2019-11-26 LAB
B-HCG UR QL: NEGATIVE
B-HCG UR QL: NEGATIVE

## 2019-11-26 PROCEDURE — 81025 URINE PREGNANCY TEST: CPT

## 2020-02-12 ENCOUNTER — TELEPHONE (OUTPATIENT)
Dept: DERMATOLOGY | Facility: CLINIC | Age: 35
End: 2020-02-12

## 2020-02-12 NOTE — TELEPHONE ENCOUNTER
Scheduled pt an appt for next Wednesday. She was kind of upset but she's okay with seeing Ban.       TB    ----- Message from Young Beth sent at 2/12/2020 12:17 PM CST -----  Contact: [phone] / self   Patient would like a call back from your office regarding scheduling. Please Advise.

## 2020-02-17 ENCOUNTER — PATIENT OUTREACH (OUTPATIENT)
Dept: ADMINISTRATIVE | Facility: OTHER | Age: 35
End: 2020-02-17

## 2020-02-19 ENCOUNTER — OFFICE VISIT (OUTPATIENT)
Dept: GASTROENTEROLOGY | Facility: CLINIC | Age: 35
End: 2020-02-19
Payer: COMMERCIAL

## 2020-02-19 ENCOUNTER — OFFICE VISIT (OUTPATIENT)
Dept: DERMATOLOGY | Facility: CLINIC | Age: 35
End: 2020-02-19
Payer: COMMERCIAL

## 2020-02-19 VITALS — BODY MASS INDEX: 28.5 KG/M2 | WEIGHT: 176.56 LBS

## 2020-02-19 DIAGNOSIS — L72.0 EIC (EPIDERMAL INCLUSION CYST): Primary | ICD-10-CM

## 2020-02-19 DIAGNOSIS — K59.04 CHRONIC IDIOPATHIC CONSTIPATION: Primary | ICD-10-CM

## 2020-02-19 DIAGNOSIS — L85.3 XEROSIS CUTIS: ICD-10-CM

## 2020-02-19 PROCEDURE — 99999 PR PBB SHADOW E&M-EST. PATIENT-LVL II: ICD-10-PCS | Mod: PBBFAC,,, | Performed by: PHYSICIAN ASSISTANT

## 2020-02-19 PROCEDURE — 99214 PR OFFICE/OUTPT VISIT, EST, LEVL IV, 30-39 MIN: ICD-10-PCS | Mod: S$GLB,,, | Performed by: PHYSICIAN ASSISTANT

## 2020-02-19 PROCEDURE — 99214 PR OFFICE/OUTPT VISIT, EST, LEVL IV, 30-39 MIN: ICD-10-PCS | Mod: S$GLB,,, | Performed by: INTERNAL MEDICINE

## 2020-02-19 PROCEDURE — 99999 PR PBB SHADOW E&M-EST. PATIENT-LVL III: ICD-10-PCS | Mod: PBBFAC,,, | Performed by: INTERNAL MEDICINE

## 2020-02-19 PROCEDURE — 3008F PR BODY MASS INDEX (BMI) DOCUMENTED: ICD-10-PCS | Mod: CPTII,S$GLB,, | Performed by: INTERNAL MEDICINE

## 2020-02-19 PROCEDURE — 3008F BODY MASS INDEX DOCD: CPT | Mod: CPTII,S$GLB,, | Performed by: INTERNAL MEDICINE

## 2020-02-19 PROCEDURE — 99999 PR PBB SHADOW E&M-EST. PATIENT-LVL II: CPT | Mod: PBBFAC,,, | Performed by: PHYSICIAN ASSISTANT

## 2020-02-19 PROCEDURE — 99214 OFFICE O/P EST MOD 30 MIN: CPT | Mod: S$GLB,,, | Performed by: PHYSICIAN ASSISTANT

## 2020-02-19 PROCEDURE — 99214 OFFICE O/P EST MOD 30 MIN: CPT | Mod: S$GLB,,, | Performed by: INTERNAL MEDICINE

## 2020-02-19 PROCEDURE — 99999 PR PBB SHADOW E&M-EST. PATIENT-LVL III: CPT | Mod: PBBFAC,,, | Performed by: INTERNAL MEDICINE

## 2020-02-19 NOTE — PATIENT INSTRUCTIONS
XEROSIS (DRY SKIN)        1. Definition    Xerosis is the term for dry skin.  We all have a natural oil coating over our skin produced by the skin oil glands.  If this oil is removed, the skin becomes dry which can lead to cracking, which can lead to inflammation.  Xerosis is usually a long-term problem that recurs often, especially in the winter.    2. Cause     Long hot baths or showers can remove our natural oil and lead to xerosis.  One should never take more than one bath or shower a day and for no longer than ten minutes.   Use of harsh soaps such as Zest, Dial, and Ivory can worsen and cause xerosis.   Cold winter weather worsens xerosis because the amount of moisture contained in cold air is much less than the amount of moisture in warm air.    3. Treatment     Treatment is intended to restore the natural oil to your skin.  Keep the skin lubricated.     Do not take more than one bath or shower a day.  Use lukewarm water, not hot.  Hot water dries out the skin.     Use a gentle moisturizing soap such as Cetaphil soap, Oil of Olay, Dove, Basis, Ivory moisture care, Restoraderm cleanser.     When toweling dry, dont rub.  Blot the skin so there is still some water left on the skin.  You should apply a moisturizing cream to all of the skin such as Cerave cream, Cetaphil cream, Restoraderm or Eucerin Original Formula cream.   Alpha hydroxyacid lotions, i.e., AmLactin, also work very well for preventing dry skin, but may burn when used on inflamed or reddened skin.     If you like to swim during the winter months, you should not use soap when getting out of the pool.  When you have finished swimming, rinse off the chlorine with cool to warm water.  If this will be the only shower of the day, then you may use Cetaphil or another mild soap to cleanse your skin.  After the shower, apply a moisturizing cream to all of the skin as above.        [address] [place] [address]/ [phone]  [phone] FAX/ [email or web address]

## 2020-02-19 NOTE — PROGRESS NOTES
Current the ER Subjective:       Patient ID: Doroteo Sun is a 34 y.o. female.    Chief Complaint: Constipation    This is a 34-year-old female here for a follow-up regarding chronic constipation. She describes her constipation is abdominal discomfort associated with infrequent bowel movements.  Is chronic in nature, occurring for years without other exacerbating or relieving factors or other associated symptoms.  Over the years she has been a number of over-the-counter treatments in addition to trials of Linzess and stimulant laxatives.  She was then placed on Trulance with good relief.  This unfortunately change when her insurance changed.  No weight loss, bleeding, change in stool caliber.    The following portions of the patient's history were reviewed and updated as appropriate: allergies, current medications, past family history, past medical history, past social history, past surgical history and problem list.    (Portions of this note were dictated using voice recognition software and may contain dictation related errors in spelling/grammar/syntax not found on text review)  HPI  Review of Systems   Constitutional: Negative for activity change and appetite change.   Respiratory: Negative for shortness of breath and wheezing.    Cardiovascular: Negative for chest pain and palpitations.   Gastrointestinal: Positive for abdominal pain and constipation.         Objective:      Physical Exam   Constitutional: She is oriented to person, place, and time. She appears well-developed and well-nourished. No distress.   HENT:   Head: Normocephalic and atraumatic.   Eyes: Conjunctivae are normal. No scleral icterus.   Cardiovascular: Normal rate.   Pulmonary/Chest: Effort normal. No respiratory distress.   Abdominal: Soft. Bowel sounds are normal. She exhibits no distension and no mass. There is no tenderness. There is no guarding.   Neurological: She is alert and oriented to person, place, and time.   Skin: Skin is  warm and dry. She is not diaphoretic.   Psychiatric: She has a normal mood and affect. Her behavior is normal. Judgment and thought content normal.   Nursing note and vitals reviewed.        Labs/imaging; reviewed  Assessment:       1. Chronic idiopathic constipation        Plan:   1. Doing well with trulance previously, sent rx and filled out form to help her get it  2. Could consider amitiza vs motegrity if denied again

## 2020-02-19 NOTE — PROGRESS NOTES
Subjective:       Patient ID:  Doroteo Sun is a 34 y.o. female who presents for   Chief Complaint   Patient presents with    Cyst     chest and scalp, X1mo, tx     Dry Skin     upper body, X3wks , otc tx      History of Present Illness: The patient presents with chief complaint of dry skin.  Location: chest and back  Duration: 3 wks  Signs/Symptoms: mildly itchy  Prior treatments: vanicream qd, showers with Dove soap qhs and hot water    Pt also c/o a spot on the chest x 4 wks. Was red and tender but is now just a bump under the skin.      Review of Systems   Skin: Positive for dry skin. Negative for itching, rash and tendency to form keloidal scars.   Hematologic/Lymphatic: Does not bruise/bleed easily.        Objective:    Physical Exam   Constitutional: She appears well-developed and well-nourished. No distress.   Neurological: She is alert and oriented to person, place, and time. She is not disoriented.   Psychiatric: She has a normal mood and affect.   Skin:   Areas Examined (abnormalities noted in diagram):   Head / Face Inspection Performed  Neck Inspection Performed  Chest / Axilla Inspection Performed  Back Inspection Performed                   Diagram Legend     Erythematous scaling macule/papule c/w actinic keratosis       Vascular papule c/w angioma      Pigmented verrucoid papule/plaque c/w seborrheic keratosis      Yellow umbilicated papule c/w sebaceous hyperplasia      Irregularly shaped tan macule c/w lentigo     1-2 mm smooth white papules consistent with Milia      Movable subcutaneous cyst with punctum c/w epidermal inclusion cyst      Subcutaneous movable cyst c/w pilar cyst      Firm pink to brown papule c/w dermatofibroma      Pedunculated fleshy papule(s) c/w skin tag(s)      Evenly pigmented macule c/w junctional nevus     Mildly variegated pigmented, slightly irregular-bordered macule c/w mildly atypical nevus      Flesh colored to evenly pigmented papule c/w intradermal nevus        Pink pearly papule/plaque c/w basal cell carcinoma      Erythematous hyperkeratotic cursted plaque c/w SCC      Surgical scar with no sign of skin cancer recurrence      Open and closed comedones      Inflammatory papules and pustules      Verrucoid papule consistent consistent with wart     Erythematous eczematous patches and plaques     Dystrophic onycholytic nail with subungual debris c/w onychomycosis     Umbilicated papule    Erythematous-base heme-crusted tan verrucoid plaque consistent with inflamed seborrheic keratosis     Erythematous Silvery Scaling Plaque c/w Psoriasis     See annotation      Assessment / Plan:      EIC (epidermal inclusion cyst) - NP  Reassurance given to patient. No treatment is necessary.   Discussed treatment options - excision vs observation. Cysts may recur with excision. Pt will defer treatment at this time.     Xerosis cutis - NP  Good skin care regimen discussed including limiting to one bath or shower/day, using lukewarm water with mild soap and moisturizing cream to skin 1 - 2x/day. Brochure was provided and reviewed with patient.    Cetaphil soap  CeraVe crm  D/c hot showers         Follow up if symptoms worsen or fail to improve.

## 2020-02-26 ENCOUNTER — PATIENT MESSAGE (OUTPATIENT)
Dept: GASTROENTEROLOGY | Facility: CLINIC | Age: 35
End: 2020-02-26

## 2020-02-27 ENCOUNTER — TELEPHONE (OUTPATIENT)
Dept: GASTROENTEROLOGY | Facility: CLINIC | Age: 35
End: 2020-02-27

## 2020-02-27 ENCOUNTER — PATIENT MESSAGE (OUTPATIENT)
Dept: GASTROENTEROLOGY | Facility: CLINIC | Age: 35
End: 2020-02-27

## 2020-03-09 ENCOUNTER — PATIENT MESSAGE (OUTPATIENT)
Dept: GASTROENTEROLOGY | Facility: CLINIC | Age: 35
End: 2020-03-09

## 2020-03-10 RX ORDER — LUBIPROSTONE 24 UG/1
24 CAPSULE ORAL 2 TIMES DAILY
Qty: 60 CAPSULE | Refills: 2 | Status: SHIPPED | OUTPATIENT
Start: 2020-03-10 | End: 2020-06-05

## 2020-03-30 ENCOUNTER — TELEPHONE (OUTPATIENT)
Dept: GASTROENTEROLOGY | Facility: CLINIC | Age: 35
End: 2020-03-30

## 2020-04-02 ENCOUNTER — TELEPHONE (OUTPATIENT)
Dept: PHARMACY | Facility: CLINIC | Age: 35
End: 2020-04-02

## 2020-04-05 ENCOUNTER — PATIENT MESSAGE (OUTPATIENT)
Dept: DERMATOLOGY | Facility: CLINIC | Age: 35
End: 2020-04-05

## 2020-04-06 ENCOUNTER — PATIENT MESSAGE (OUTPATIENT)
Dept: FAMILY MEDICINE | Facility: CLINIC | Age: 35
End: 2020-04-06

## 2020-04-06 ENCOUNTER — PATIENT OUTREACH (OUTPATIENT)
Dept: ADMINISTRATIVE | Facility: OTHER | Age: 35
End: 2020-04-06

## 2020-04-06 ENCOUNTER — OFFICE VISIT (OUTPATIENT)
Dept: DERMATOLOGY | Facility: CLINIC | Age: 35
End: 2020-04-06
Payer: COMMERCIAL

## 2020-04-06 ENCOUNTER — TELEPHONE (OUTPATIENT)
Dept: FAMILY MEDICINE | Facility: CLINIC | Age: 35
End: 2020-04-06

## 2020-04-06 DIAGNOSIS — L27.2 ALLERGIC DERMATITIS DUE INGESTED FOOD: Primary | ICD-10-CM

## 2020-04-06 DIAGNOSIS — L29.9 ITCHING: ICD-10-CM

## 2020-04-06 PROCEDURE — 99214 OFFICE O/P EST MOD 30 MIN: CPT | Mod: 95,,, | Performed by: DERMATOLOGY

## 2020-04-06 PROCEDURE — 99214 PR OFFICE/OUTPT VISIT, EST, LEVL IV, 30-39 MIN: ICD-10-PCS | Mod: 95,,, | Performed by: DERMATOLOGY

## 2020-04-06 RX ORDER — HYDROXYZINE HYDROCHLORIDE 25 MG/1
25 TABLET, FILM COATED ORAL NIGHTLY
Qty: 30 TABLET | Refills: 2 | Status: SHIPPED | OUTPATIENT
Start: 2020-04-06 | End: 2020-05-06

## 2020-04-06 RX ORDER — TRIAMCINOLONE ACETONIDE 0.25 MG/G
CREAM TOPICAL
Qty: 80 G | Refills: 3 | Status: SHIPPED | OUTPATIENT
Start: 2020-04-06 | End: 2021-01-06

## 2020-04-06 NOTE — TELEPHONE ENCOUNTER
----- Message from Salina Medrano sent at 4/6/2020  7:04 AM CDT -----  Type:  Same Day Appointment Request    Caller is requesting a same day appointment.  Caller declined first available appointment listed below.    Name of Caller: patient  When is the first available appointment?  Symptoms: She has a rash that began in her face yesterday that as spread to other parts of her face today; she would like to be seen today.  Best Call Back Number: [phone]  Additional Information: none

## 2020-04-06 NOTE — PATIENT INSTRUCTIONS
I would make a list of the meal that you ate before the rash started with all the ingredients you can remember, to discuss with an allergist in the future.  I would avoid shrimp at this time until you have been evaluated.

## 2020-04-06 NOTE — PROGRESS NOTES
TELEMEDICINE DERMATOLOGY CONSULTATION  The patient location is: home  The chief complaint leading to consultation is: itchy rash  Visit type: Virtual visit with synchronous audio and video  Total time spent with patient: 18 minutes  Each patient to whom he or she provides medical services by telemedicine is:  (1) informed of the relationship between the physician and patient and the respective role of any other health care provider with respect to management of the patient; and (2) notified that he or she may decline to receive medical services by telemedicine and may withdraw from such care at any time.    Notes: see below    Subjective:       Patient ID:  Doroteo Sun is a 34 y.o. female who presents for new itchy eruption.    HPI  33 yo female, recently completed accutane for acne 12/2019, here for new rash.  She first noticed the rash Saturday night after dinner (had takeout - cheese ravioli with shrimp, and cheesecake).  It started on her face but now is on her neck and tops of hands as well and central chest.  No new oral medications. No new soaps/detergents. She uses Dove soap and Vanicream only, and fragrance free detergent only.  No intense sun exposure prior.  Pertinent PMH includes she reports testing + for coronavirus but feels fine.    Review of Systems   HENT: Negative for trouble swallowing, lip swelling and tongue swelling.    Gastrointestinal: Negative for nausea, vomiting and diarrhea.   Skin: Positive for itching and rash.        Objective:    Physical Exam   Constitutional: She appears well-developed and well-nourished. No distress.   Neurological: She is alert and oriented to person, place, and time. She is not disoriented.   Psychiatric: She has a normal mood and affect.   Skin:   Areas Examined (abnormalities noted in diagram):   Head / Face Inspection Performed  Neck Inspection Performed  RUE Inspected  LUE Inspection Performed              Diagram Legend     Erythematous scaling  macule/papule c/w actinic keratosis       Vascular papule c/w angioma      Pigmented verrucoid papule/plaque c/w seborrheic keratosis      Yellow umbilicated papule c/w sebaceous hyperplasia      Irregularly shaped tan macule c/w lentigo     1-2 mm smooth white papules consistent with Milia      Movable subcutaneous cyst with punctum c/w epidermal inclusion cyst      Subcutaneous movable cyst c/w pilar cyst      Firm pink to brown papule c/w dermatofibroma      Pedunculated fleshy papule(s) c/w skin tag(s)      Evenly pigmented macule c/w junctional nevus     Mildly variegated pigmented, slightly irregular-bordered macule c/w mildly atypical nevus      Flesh colored to evenly pigmented papule c/w intradermal nevus       Pink pearly papule/plaque c/w basal cell carcinoma      Erythematous hyperkeratotic cursted plaque c/w SCC      Surgical scar with no sign of skin cancer recurrence      Open and closed comedones      Inflammatory papules and pustules      Verrucoid papule consistent consistent with wart     Erythematous eczematous patches and plaques     Dystrophic onycholytic nail with subungual debris c/w onychomycosis     Umbilicated papule    Erythematous-base heme-crusted tan verrucoid plaque consistent with inflamed seborrheic keratosis     Erythematous Silvery Scaling Plaque c/w Psoriasis     See annotation                  Assessment / Plan:        Allergic dermatitis due ingested food - pt reports rash began soon after she finished meal - no shortness of breath, hives, lip swelling.  Clinically could also be c/w a photoeruption although pt denies any sun exposure  Less likely viral exanthem to COVID    Would advise atarax qhs, zyrtec qam,   Topical 0.1 tac OR lidex to body rash bid, and will send tac 0.25 to rash on face/neck    -     triamcinolone acetonide 0.025% (KENALOG) 0.025 % cream; AAA bid to rash on face/neck x 2 weeks  Dispense: 80 g; Refill: 3  -     hydroxyzine HCL (ATARAX) 25 MG tablet; Take 1  tablet (25 mg total) by mouth every evening. Prn pruritus. Do not drive or operate machinery while taking this medicine.  Dispense: 30 tablet; Refill: 2    Would not eat shrimp again until tested by allergy    Itching  Measures above will help           Follow up if symptoms worsen or fail to improve.

## 2020-04-14 ENCOUNTER — PATIENT MESSAGE (OUTPATIENT)
Dept: FAMILY MEDICINE | Facility: CLINIC | Age: 35
End: 2020-04-14

## 2020-04-20 DIAGNOSIS — F41.9 ANXIETY DISORDER, UNSPECIFIED TYPE: ICD-10-CM

## 2020-04-20 RX ORDER — ESCITALOPRAM OXALATE 5 MG/1
5 TABLET ORAL DAILY
Qty: 90 TABLET | Refills: 1 | Status: SHIPPED | OUTPATIENT
Start: 2020-04-20 | End: 2021-06-24

## 2020-04-20 RX ORDER — HYDROCHLOROTHIAZIDE 12.5 MG/1
12.5 TABLET ORAL DAILY PRN
Qty: 30 TABLET | Refills: 1 | Status: SHIPPED | OUTPATIENT
Start: 2020-04-20 | End: 2021-07-02 | Stop reason: SDUPTHER

## 2020-04-21 DIAGNOSIS — Z01.84 ANTIBODY RESPONSE EXAMINATION: ICD-10-CM

## 2020-04-23 ENCOUNTER — LAB VISIT (OUTPATIENT)
Dept: LAB | Facility: HOSPITAL | Age: 35
End: 2020-04-23
Attending: INTERNAL MEDICINE
Payer: COMMERCIAL

## 2020-04-23 DIAGNOSIS — Z01.84 ANTIBODY RESPONSE EXAMINATION: ICD-10-CM

## 2020-04-23 LAB — SARS-COV-2 IGG SERPL QL IA: POSITIVE

## 2020-04-23 PROCEDURE — 86769 SARS-COV-2 COVID-19 ANTIBODY: CPT

## 2020-04-23 PROCEDURE — 36415 COLL VENOUS BLD VENIPUNCTURE: CPT

## 2020-05-25 ENCOUNTER — TELEPHONE (OUTPATIENT)
Dept: PHARMACY | Facility: CLINIC | Age: 35
End: 2020-05-25

## 2020-05-25 ENCOUNTER — PATIENT MESSAGE (OUTPATIENT)
Dept: GASTROENTEROLOGY | Facility: CLINIC | Age: 35
End: 2020-05-25

## 2020-05-26 ENCOUNTER — PATIENT MESSAGE (OUTPATIENT)
Dept: DERMATOLOGY | Facility: CLINIC | Age: 35
End: 2020-05-26

## 2020-05-26 ENCOUNTER — PATIENT MESSAGE (OUTPATIENT)
Dept: GASTROENTEROLOGY | Facility: CLINIC | Age: 35
End: 2020-05-26

## 2020-06-05 ENCOUNTER — OFFICE VISIT (OUTPATIENT)
Dept: FAMILY MEDICINE | Facility: CLINIC | Age: 35
End: 2020-06-05
Payer: COMMERCIAL

## 2020-06-05 DIAGNOSIS — H02.846 EYELID EDEMA, LEFT: Primary | ICD-10-CM

## 2020-06-05 DIAGNOSIS — L72.0 EIC (EPIDERMAL INCLUSION CYST): ICD-10-CM

## 2020-06-05 DIAGNOSIS — M79.605 PAIN OF LEFT LOWER EXTREMITY: ICD-10-CM

## 2020-06-05 DIAGNOSIS — K59.09 CHRONIC CONSTIPATION: ICD-10-CM

## 2020-06-05 PROCEDURE — 99214 OFFICE O/P EST MOD 30 MIN: CPT | Mod: 95,,, | Performed by: FAMILY MEDICINE

## 2020-06-05 PROCEDURE — 99214 PR OFFICE/OUTPT VISIT, EST, LEVL IV, 30-39 MIN: ICD-10-PCS | Mod: 95,,, | Performed by: FAMILY MEDICINE

## 2020-06-05 RX ORDER — GABAPENTIN 100 MG/1
100 CAPSULE ORAL NIGHTLY
Qty: 30 CAPSULE | Refills: 1 | OUTPATIENT
Start: 2020-06-05 | End: 2021-02-15

## 2020-06-05 NOTE — PROGRESS NOTES
The patient location is: home  The chief complaint leading to consultation is:    Chief Complaint   Patient presents with    Facial Swelling     Visit type: Virtual visit with synchronous audio and video  Total time spent with patient:  15 min  Each patient to whom he or she provides medical services by telemedicine is:  (1) informed of the relationship between the physician and patient and the respective role of any other health care provider with respect to management of the patient; and (2) notified that he or she may decline to receive medical services by telemedicine and may withdraw from such care at any time.    (Portions of this note were dictated using voice recognition software and may contain dictation related errors in spelling/grammar/syntax not found on text review)         HPI: 34 y.o. female Virtual visit for left eye swelling    Of note chart reviewed, history of acne completing Accutane 12/20/2019.  saw dermatology in April with concern about rash on face and neck and top of hands and central chest.  Does have a positive CoVID test prior.  Symptoms seem related to ingested food.  (cheese ravioli with shrimp, and cheesecake)  Advised Atarax, Zyrtec, topical 0.1 percent triamcinolone cream on body or 0.25 to face and neck.    She states that a week ago her mom noticed some infraorbital swelling.  Patient was asymptomatic with respect to any vision change, irritation of the eye, itching of the eye, pain of the eye redness of the eye.  She wears eye makeup but nothing different than her normal.  She denies any trauma.  No swelling of the lips or tongue.  No cough or congestion or runny nose or stuffy nose or postnasal drip symptoms.  She did not put anything on the eye.  She states that the symptoms will come and go.  She is not sure of any specific relationship to food or environmental exposures.  She does not wear contact lenses.  She is not currently taking the Zyrtec as above on a daily basis,  just as needed.    No new meds.  Takes propranolol occasionally for anxiety and headaches.  Takes hydrochlorothiazide occasionally for fluid retention.  Trying to get trulance approved for chronic constipation    No new makeup or other new skin products.    Does have some occasional hip pains for which she was prescribed gabapentin at 1 time.  Would just take this occasionally if she needs to.  Does need a prescription refill    Also has a small nodule upper forehead, was told it was a cyst.  Was told that it likely is not necessary for any surgical management.  Denies any pain here.  Did know if there is any cream that she could for this.    Past Medical History:   Diagnosis Date    Acne     Anxiety     Chronic constipation        No past surgical history on file.    Family History   Problem Relation Age of Onset    Hypertension Other     Cancer Neg Hx     Diabetes Neg Hx     Breast cancer Neg Hx     Colon cancer Neg Hx     Ovarian cancer Neg Hx     Cervical cancer Neg Hx     Endometrial cancer Neg Hx     Vaginal cancer Neg Hx     Melanoma Neg Hx        Social History     Tobacco Use    Smoking status: Never Smoker    Smokeless tobacco: Never Used   Substance Use Topics    Alcohol use: Yes     Alcohol/week: 0.0 standard drinks     Comment: Socially.    Drug use: No       Lab Results   Component Value Date    WBC 4.82 07/22/2019    HGB 11.5 (L) 07/22/2019    HCT 38.3 07/22/2019    MCV 84 07/22/2019     07/22/2019    CHOL 156 07/22/2019    TRIG 62 07/22/2019    HDL 52 07/22/2019    ALT 17 07/22/2019    AST 17 07/22/2019    BILITOT 1.0 07/22/2019    ALKPHOS 71 07/22/2019     05/27/2019    K 4.1 05/27/2019     05/27/2019    CREATININE 0.8 05/27/2019    ESTGFRAFRICA >60.0 05/27/2019    EGFRNONAA >60.0 05/27/2019    CALCIUM 9.8 05/27/2019    ALBUMIN 3.5 07/22/2019    BUN 19 05/27/2019    CO2 28 05/27/2019    TSH 0.997 04/29/2019    INR 1.0 11/16/2016    HGBA1C 4.9 04/13/2018    LDLCALC  91.6 07/22/2019    GLU 91 05/27/2019                 ROS:  GENERAL: No fever, chills, fatigability or weight loss.  SKIN:  Above.  HEAD: No headaches.  EYES:  Above  EARS: No ear pain or changes in hearing.  NOSE: No congestion or rhinorrhea.  MOUTH & THROAT: No hoarseness, change in voice, or sore throat.  NODES: Denies swollen glands.  CHEST: Denies RANDALL, cyanosis, wheezing, cough and sputum production.  CARDIOVASCULAR: Denies chest pain, PND, orthopnea.  ABDOMEN: No nausea, vomiting, or changes in bowel function.  URINARY: No flank pain, dysuria or hematuria.  PERIPHERAL VASCULAR: No claudication or cyanosis.  MUSCULOSKELETAL: No joint stiffness or swelling. Denies back pain.  NEUROLOGIC: No weakness or numbness.       PE (elements able to be captured on virtual encounter)  APPEARANCE: Well nourished, well developed, in no acute distress.    HEAD: Normocephalic, atraumatic.  Tiny nodule noted mid upper forehead likely   EIC per history  EYES:  .   Conjunctivae noninjected.  Very subtly noticeable infraorbital edema on left.  No overlying skin changes otherwise like erythema.  RESPIRATORY:  No increased work of breathing or objective visual signs of dyspnea  PSYCHIATRIC:  Normal mood and affect, alert and oriented, appropriate answers to questions      IMPRESSION  1. Eyelid edema, left    2. Pain of left lower extremity    3. Chronic constipation    4. EIC (epidermal inclusion cyst)            PLAN  Advised on getting on Zyrtec regularly for the next 2-3 weeks.  Hydroxyzine as needed for any breakthrough symptoms.  Avoid eye makeup even if it is her normal make up for the next several weeks.  Advise cold compresses as needed Notify for any progressive significant worsening of face/eyelid edema symptoms which may necessitate further workup, possible pulse dose steroids, and allergy testing    EIC scalp:  Diagnosed by dermatology in the past, at least visualization on video call does not appear concerning at all.   Reassured.  No treatment needed.  Discussed that topical treatments would be not effective for this    Chronic constipation, working with GI on approval of Trulance    Occasional leg pain:  Gabapentin use on occasion for flare-ups.  Needs a prescription refill which was provided.          Answers for HPI/ROS submitted by the patient on 6/5/2020   activity change: No  unexpected weight change: No  neck pain: No  hearing loss: No  rhinorrhea: No  trouble swallowing: No  eye discharge: No  visual disturbance: No  chest tightness: No  wheezing: No  chest pain: No  palpitations: No  blood in stool: No  constipation: No  vomiting: No  diarrhea: No  polydipsia: No  difficulty urinating: No  hematuria: No  menstrual problem: No  dysuria: No  joint swelling: No  arthralgias: No  headaches: No  weakness: No  confusion: No  dysphoric mood: No

## 2020-06-23 ENCOUNTER — PATIENT OUTREACH (OUTPATIENT)
Dept: ADMINISTRATIVE | Facility: OTHER | Age: 35
End: 2020-06-23

## 2020-06-24 ENCOUNTER — OFFICE VISIT (OUTPATIENT)
Dept: DERMATOLOGY | Facility: CLINIC | Age: 35
End: 2020-06-24
Payer: COMMERCIAL

## 2020-06-24 DIAGNOSIS — L70.0 ACNE VULGARIS: Primary | ICD-10-CM

## 2020-06-24 PROCEDURE — 99213 PR OFFICE/OUTPT VISIT, EST, LEVL III, 20-29 MIN: ICD-10-PCS | Mod: 95,,, | Performed by: DERMATOLOGY

## 2020-06-24 PROCEDURE — 99213 OFFICE O/P EST LOW 20 MIN: CPT | Mod: 95,,, | Performed by: DERMATOLOGY

## 2020-06-24 RX ORDER — TAZAROTENE 1 MG/G
GEL CUTANEOUS NIGHTLY
Qty: 100 G | Refills: 1 | Status: SHIPPED | OUTPATIENT
Start: 2020-06-24 | End: 2021-01-06

## 2020-06-26 NOTE — PROGRESS NOTES
The patient location is: work  The chief complaint leading to consultation is: acne    Visit type: audiovisual    Face to Face time with patient: 6 minutes  10 minutes of total time spent on the encounter, which includes face to face time and non-face to face time preparing to see the patient (eg, review of tests), Obtaining and/or reviewing separately obtained history, Documenting clinical information in the electronic or other health record, Independently interpreting results (not separately reported) and communicating results to the patient/family/caregiver, or Care coordination (not separately reported).         Each patient to whom he or she provides medical services by telemedicine is:  (1) informed of the relationship between the physician and patient and the respective role of any other health care provider with respect to management of the patient; and (2) notified that he or she may decline to receive medical services by telemedicine and may withdraw from such care at any time.    Notes:     Subjective:       Patient ID:  Doroteo Sun is a 34 y.o. female who presents for acne.    HPI  35 yo female, most recently seen virtually for rash after having shrimp that has since resolved, here for f/u acne. Has had intermittent breakouts in the past and has responded to Tazorac 0.1 gel.  Right now having acne on forehead only. Has not tried any rx products on it it.    Review of Systems   Skin: Positive for rash. Negative for itching.        Objective:    Physical Exam   Constitutional: She appears well-developed and well-nourished. No distress.   Neurological: She is alert and oriented to person, place, and time. She is not disoriented.   Psychiatric: She has a normal mood and affect.   Skin:   Areas Examined (abnormalities noted in diagram):   Head / Face Inspection Performed              Diagram Legend     Erythematous scaling macule/papule c/w actinic keratosis       Vascular papule c/w angioma       Pigmented verrucoid papule/plaque c/w seborrheic keratosis      Yellow umbilicated papule c/w sebaceous hyperplasia      Irregularly shaped tan macule c/w lentigo     1-2 mm smooth white papules consistent with Milia      Movable subcutaneous cyst with punctum c/w epidermal inclusion cyst      Subcutaneous movable cyst c/w pilar cyst      Firm pink to brown papule c/w dermatofibroma      Pedunculated fleshy papule(s) c/w skin tag(s)      Evenly pigmented macule c/w junctional nevus     Mildly variegated pigmented, slightly irregular-bordered macule c/w mildly atypical nevus      Flesh colored to evenly pigmented papule c/w intradermal nevus       Pink pearly papule/plaque c/w basal cell carcinoma      Erythematous hyperkeratotic cursted plaque c/w SCC      Surgical scar with no sign of skin cancer recurrence      Open and closed comedones      Inflammatory papules and pustules      Verrucoid papule consistent consistent with wart     Erythematous eczematous patches and plaques     Dystrophic onycholytic nail with subungual debris c/w onychomycosis     Umbilicated papule    Erythematous-base heme-crusted tan verrucoid plaque consistent with inflamed seborrheic keratosis     Erythematous Silvery Scaling Plaque c/w Psoriasis     See annotation          Assessment / Plan:        Acne vulgaris  -     tazarotene (TAZORAC) 0.1 % Gel gel; Apply topically every evening.  Dispense: 100 g; Refill: 1    Can also use otc salicylic acid or BP wash such as panoxyl so long as not too drying             Follow up if symptoms worsen or fail to improve.

## 2020-06-30 ENCOUNTER — TELEPHONE (OUTPATIENT)
Dept: PHARMACY | Facility: CLINIC | Age: 35
End: 2020-06-30

## 2020-07-06 ENCOUNTER — TELEPHONE (OUTPATIENT)
Dept: PHARMACY | Facility: CLINIC | Age: 35
End: 2020-07-06

## 2020-07-07 ENCOUNTER — PATIENT OUTREACH (OUTPATIENT)
Dept: ADMINISTRATIVE | Facility: OTHER | Age: 35
End: 2020-07-07

## 2020-07-07 NOTE — PROGRESS NOTES
Requested updates within Care Everywhere.  Patient's chart was reviewed for overdue CARIN topics.  Immunizations reconciled.    Orders placed:n/a  Tasked appts:n/a  Labs Linked:n/a

## 2020-07-08 ENCOUNTER — OFFICE VISIT (OUTPATIENT)
Dept: OPTOMETRY | Facility: CLINIC | Age: 35
End: 2020-07-08
Payer: COMMERCIAL

## 2020-07-08 DIAGNOSIS — H52.13 MYOPIA OF BOTH EYES WITH REGULAR ASTIGMATISM: ICD-10-CM

## 2020-07-08 DIAGNOSIS — Z01.00 EYE EXAM, ROUTINE: Primary | ICD-10-CM

## 2020-07-08 DIAGNOSIS — H52.223 MYOPIA OF BOTH EYES WITH REGULAR ASTIGMATISM: ICD-10-CM

## 2020-07-08 PROCEDURE — 92004 PR EYE EXAM, NEW PATIENT,COMPREHESV: ICD-10-PCS | Mod: S$GLB,,, | Performed by: OPTOMETRIST

## 2020-07-08 PROCEDURE — 92015 PR REFRACTION: ICD-10-PCS | Mod: S$GLB,,, | Performed by: OPTOMETRIST

## 2020-07-08 PROCEDURE — 92015 DETERMINE REFRACTIVE STATE: CPT | Mod: S$GLB,,, | Performed by: OPTOMETRIST

## 2020-07-08 PROCEDURE — 99999 PR PBB SHADOW E&M-EST. PATIENT-LVL III: ICD-10-PCS | Mod: PBBFAC,,, | Performed by: OPTOMETRIST

## 2020-07-08 PROCEDURE — 99999 PR PBB SHADOW E&M-EST. PATIENT-LVL III: CPT | Mod: PBBFAC,,, | Performed by: OPTOMETRIST

## 2020-07-08 PROCEDURE — 92004 COMPRE OPH EXAM NEW PT 1/>: CPT | Mod: S$GLB,,, | Performed by: OPTOMETRIST

## 2020-07-09 NOTE — PROGRESS NOTES
HPI     Pt is here for a dilated eye exam.  Blurry vision with current RX, wears at night time to drive  Hx of CLs, PTW.  No need for fit today      Last edited by Jono Tyson, OD on 7/8/2020  3:41 PM. (History)            Assessment /Plan     For exam results, see Encounter Report.    Eye exam, routine  -Annual DFE    Myopia of both eyes with regular astigmatism  Eyeglass Final Rx     Eyeglass Final Rx       Sphere Cyl[facility]    Right -1.50 +0.50 015 20/20    Left -1.50 Sphere  20/20    Type: SVL    Expiration Date: 7/9/2021                  RTC 1 yr

## 2020-10-13 DIAGNOSIS — G44.219 EPISODIC TENSION-TYPE HEADACHE, NOT INTRACTABLE: ICD-10-CM

## 2020-10-13 DIAGNOSIS — R03.0 ELEVATED BP WITHOUT DIAGNOSIS OF HYPERTENSION: ICD-10-CM

## 2020-10-13 RX ORDER — PROPRANOLOL HYDROCHLORIDE 10 MG/1
10 TABLET ORAL 2 TIMES DAILY
Qty: 60 TABLET | Refills: 11 | Status: SHIPPED | OUTPATIENT
Start: 2020-10-13 | End: 2021-10-14 | Stop reason: SDUPTHER

## 2020-10-23 ENCOUNTER — OFFICE VISIT (OUTPATIENT)
Dept: FAMILY MEDICINE | Facility: CLINIC | Age: 35
End: 2020-10-23
Payer: COMMERCIAL

## 2020-10-23 ENCOUNTER — PATIENT MESSAGE (OUTPATIENT)
Dept: FAMILY MEDICINE | Facility: CLINIC | Age: 35
End: 2020-10-23

## 2020-10-23 DIAGNOSIS — R03.0 ELEVATED BP WITHOUT DIAGNOSIS OF HYPERTENSION: ICD-10-CM

## 2020-10-23 DIAGNOSIS — R51.9 PERIORBITAL HEADACHE: Primary | ICD-10-CM

## 2020-10-23 PROCEDURE — 99214 OFFICE O/P EST MOD 30 MIN: CPT | Mod: 95,,, | Performed by: FAMILY MEDICINE

## 2020-10-23 PROCEDURE — 99214 PR OFFICE/OUTPT VISIT, EST, LEVL IV, 30-39 MIN: ICD-10-PCS | Mod: 95,,, | Performed by: FAMILY MEDICINE

## 2020-10-23 NOTE — PROGRESS NOTES
The patient location is: home  The chief complaint leading to consultation is:  No chief complaint on file.    Visit type: Virtual visit with synchronous audio and video  Total time spent with patient:  15 min  Each patient to whom he or she provides medical services by telemedicine is:  (1) informed of the relationship between the physician and patient and the respective role of any other health care provider with respect to management of the patient; and (2) notified that he or she may decline to receive medical services by telemedicine and may withdraw from such care at any time.    (Portions of this note were dictated using voice recognition software and may contain dictation related errors in spelling/grammar/syntax not found on text review)    CC: No chief complaint on file.      HPI: 35 y.o. female Virtual visit for headaches    Has propranolol, takes occasionally for anxiety and headaches.  Takes hydrochlorothiazide for fluid retention, Linzess for chronic constipation    Takes Lexapro for anxiety, has gone down on dose secondary to concerned about weight gain.  Currently at 5 mg daily    Got  October 10th.  Day after started getting headache right periorbital.  Not sure if he was alcohol related as she just had a couple of drinks the night of the wedding, verses dietary related as she did have some pasta that was fairly salty.  She does notice that she will get headache usually postprandial.  The other day she had chick agnes-a and about 45 min afterwards started with the headache.  No nausea or vomiting.  Some very slight light sensitivity.  No auras.  Denies any neuro issues.  She did take a propranolol and the symptoms did improve.  Notice a blood pressure was elevated 132/91 which is high for her recently.  She did notice 1 time she had some swelling and she took hydrochlorothiazide but she does not take this consistently.    Past Medical History:   Diagnosis Date    Acne     Anxiety     Chronic  constipation        No past surgical history on file.    Family History   Problem Relation Age of Onset    Hypertension Other     Cancer Neg Hx     Diabetes Neg Hx     Breast cancer Neg Hx     Colon cancer Neg Hx     Ovarian cancer Neg Hx     Cervical cancer Neg Hx     Endometrial cancer Neg Hx     Vaginal cancer Neg Hx     Melanoma Neg Hx        Social History     Tobacco Use    Smoking status: Never Smoker    Smokeless tobacco: Never Used   Substance Use Topics    Alcohol use: Yes     Alcohol/week: 0.0 standard drinks     Comment: Socially.    Drug use: No       Lab Results   Component Value Date    WBC 4.82 07/22/2019    HGB 11.5 (L) 07/22/2019    HCT 38.3 07/22/2019    MCV 84 07/22/2019     07/22/2019    CHOL 156 07/22/2019    TRIG 62 07/22/2019    HDL 52 07/22/2019    ALT 17 07/22/2019    AST 17 07/22/2019    BILITOT 1.0 07/22/2019    ALKPHOS 71 07/22/2019     05/27/2019    K 4.1 05/27/2019     05/27/2019    CREATININE 0.8 05/27/2019    ESTGFRAFRICA >60.0 05/27/2019    EGFRNONAA >60.0 05/27/2019    CALCIUM 9.8 05/27/2019    ALBUMIN 3.5 07/22/2019    BUN 19 05/27/2019    CO2 28 05/27/2019    TSH 0.997 04/29/2019    INR 1.0 11/16/2016    HGBA1C 4.9 04/13/2018    LDLCALC 91.6 07/22/2019    GLU 91 05/27/2019                 ROS:  GENERAL: No fever, chills, fatigability or weight loss.  SKIN: No rashes, no itching.  HEAD:  Above.  EYES: No visual changes  EARS: No ear pain or changes in hearing.  NOSE: No congestion or rhinorrhea.  MOUTH & THROAT: No hoarseness, change in voice, or sore throat.  NODES: Denies swollen glands.  CHEST: Denies RANDALL, cyanosis, wheezing, cough and sputum production.  CARDIOVASCULAR: Denies chest pain, PND, orthopnea.  ABDOMEN: No nausea, vomiting, or changes in bowel function.  URINARY: No flank pain, dysuria or hematuria.  PERIPHERAL VASCULAR:  Above  MUSCULOSKELETAL: No joint stiffness or swelling. Denies back pain.  NEUROLOGIC: No weakness or  numbness.      PE:  On video call   APPEARANCE: Well nourished, well developed, in no acute distress.    HEAD: Normocephalic, atraumatic.  EYES:.   Conjunctivae noninjected.   RESPIRATORY:  No increased work of breathing  PSYCHIATRIC: NORMAL MOOD AND AFFECT    IMPRESSION  1. Periorbital headache    2. Elevated BP without diagnosis of hypertension            PLAN  Headache that seen postprandial in related to mild blood pressure elevation.  Could be dietary/sodium related.  Discussed impacts of things like stress as well.  Would advise that she start on the propranolol 10 mg b.i.d. as a regular medicine rather than just p.r.n. at least for the next 4 weeks.  Monitor sodium in diet and monitor pressure closely.  May consider daily HCTZ if pressure remains elevated.  Follow back up in 1 month or sooner as needed.  Discussed safety netting/red flag symptoms to seek further care for                 Answers for HPI/ROS submitted by the patient on 10/23/2020   activity change: No  unexpected weight change: No  neck pain: No  hearing loss: No  rhinorrhea: No  trouble swallowing: No  eye discharge: No  visual disturbance: No  chest tightness: No  wheezing: No  chest pain: No  palpitations: No  blood in stool: No  constipation: No  vomiting: No  diarrhea: No  polydipsia: No  polyuria: No  difficulty urinating: No  hematuria: No  menstrual problem: No  dysuria: No  joint swelling: No  headaches: No  weakness: No  confusion: No  dysphoric mood: No

## 2020-11-12 ENCOUNTER — PATIENT MESSAGE (OUTPATIENT)
Dept: FAMILY MEDICINE | Facility: CLINIC | Age: 35
End: 2020-11-12

## 2020-11-12 ENCOUNTER — PATIENT OUTREACH (OUTPATIENT)
Dept: ADMINISTRATIVE | Facility: HOSPITAL | Age: 35
End: 2020-11-12

## 2021-01-06 ENCOUNTER — OFFICE VISIT (OUTPATIENT)
Dept: OBSTETRICS AND GYNECOLOGY | Facility: CLINIC | Age: 36
End: 2021-01-06
Payer: COMMERCIAL

## 2021-01-06 VITALS
SYSTOLIC BLOOD PRESSURE: 115 MMHG | BODY MASS INDEX: 27.92 KG/M2 | DIASTOLIC BLOOD PRESSURE: 85 MMHG | HEIGHT: 66 IN | WEIGHT: 173.75 LBS

## 2021-01-06 DIAGNOSIS — Z11.51 ENCOUNTER FOR SCREENING FOR HUMAN PAPILLOMAVIRUS (HPV): ICD-10-CM

## 2021-01-06 DIAGNOSIS — Z12.4 PAP SMEAR FOR CERVICAL CANCER SCREENING: ICD-10-CM

## 2021-01-06 DIAGNOSIS — Z01.419 ENCOUNTER FOR ANNUAL ROUTINE GYNECOLOGICAL EXAMINATION: Primary | ICD-10-CM

## 2021-01-06 DIAGNOSIS — N89.8 VAGINAL IRRITATION: ICD-10-CM

## 2021-01-06 PROCEDURE — 3008F BODY MASS INDEX DOCD: CPT | Mod: CPTII,S$GLB,, | Performed by: OBSTETRICS & GYNECOLOGY

## 2021-01-06 PROCEDURE — 99999 PR PBB SHADOW E&M-EST. PATIENT-LVL III: CPT | Mod: PBBFAC,,, | Performed by: OBSTETRICS & GYNECOLOGY

## 2021-01-06 PROCEDURE — 87624 HPV HI-RISK TYP POOLED RSLT: CPT

## 2021-01-06 PROCEDURE — 99385 PREV VISIT NEW AGE 18-39: CPT | Mod: S$GLB,,, | Performed by: OBSTETRICS & GYNECOLOGY

## 2021-01-06 PROCEDURE — 1126F PR PAIN SEVERITY QUANTIFIED, NO PAIN PRESENT: ICD-10-PCS | Mod: S$GLB,,, | Performed by: OBSTETRICS & GYNECOLOGY

## 2021-01-06 PROCEDURE — 88175 CYTOPATH C/V AUTO FLUID REDO: CPT

## 2021-01-06 PROCEDURE — 99999 PR PBB SHADOW E&M-EST. PATIENT-LVL III: ICD-10-PCS | Mod: PBBFAC,,, | Performed by: OBSTETRICS & GYNECOLOGY

## 2021-01-06 PROCEDURE — 3008F PR BODY MASS INDEX (BMI) DOCUMENTED: ICD-10-PCS | Mod: CPTII,S$GLB,, | Performed by: OBSTETRICS & GYNECOLOGY

## 2021-01-06 PROCEDURE — 1126F AMNT PAIN NOTED NONE PRSNT: CPT | Mod: S$GLB,,, | Performed by: OBSTETRICS & GYNECOLOGY

## 2021-01-06 PROCEDURE — 99385 PR PREVENTIVE VISIT,NEW,18-39: ICD-10-PCS | Mod: S$GLB,,, | Performed by: OBSTETRICS & GYNECOLOGY

## 2021-01-06 RX ORDER — PHENTERMINE HYDROCHLORIDE 37.5 MG/1
37.5 TABLET ORAL DAILY
COMMUNITY
Start: 2020-11-17 | End: 2021-12-02

## 2021-01-08 LAB
CANDIDA RRNA VAG QL PROBE: POSITIVE
G VAGINALIS RRNA GENITAL QL PROBE: POSITIVE
T VAGINALIS RRNA GENITAL QL PROBE: NEGATIVE

## 2021-01-10 DIAGNOSIS — N76.0 BACTERIAL VAGINITIS: ICD-10-CM

## 2021-01-10 DIAGNOSIS — B96.89 BACTERIAL VAGINITIS: ICD-10-CM

## 2021-01-10 DIAGNOSIS — B37.31 CANDIDAL VAGINITIS: Primary | ICD-10-CM

## 2021-01-10 RX ORDER — TERCONAZOLE 4 MG/G
1 CREAM VAGINAL NIGHTLY
Qty: 45 G | Refills: 0 | Status: SHIPPED | OUTPATIENT
Start: 2021-01-10 | End: 2021-01-17

## 2021-01-10 RX ORDER — METRONIDAZOLE 500 MG/1
500 TABLET ORAL EVERY 12 HOURS
Qty: 10 TABLET | Refills: 0 | Status: SHIPPED | OUTPATIENT
Start: 2021-01-10 | End: 2021-01-11 | Stop reason: SINTOL

## 2021-01-11 ENCOUNTER — PATIENT MESSAGE (OUTPATIENT)
Dept: OBSTETRICS AND GYNECOLOGY | Facility: CLINIC | Age: 36
End: 2021-01-11

## 2021-01-11 DIAGNOSIS — N76.0 BACTERIAL VAGINITIS: Primary | ICD-10-CM

## 2021-01-11 DIAGNOSIS — B96.89 BACTERIAL VAGINITIS: Primary | ICD-10-CM

## 2021-01-11 RX ORDER — METRONIDAZOLE 7.5 MG/G
1 GEL VAGINAL NIGHTLY
Qty: 70 G | Refills: 0 | Status: SHIPPED | OUTPATIENT
Start: 2021-01-11 | End: 2021-01-16

## 2021-01-19 LAB
HPV HR 12 DNA SPEC QL NAA+PROBE: NEGATIVE
HPV16 AG SPEC QL: NEGATIVE
HPV18 DNA SPEC QL NAA+PROBE: NEGATIVE

## 2021-02-04 LAB
FINAL PATHOLOGIC DIAGNOSIS: NORMAL
Lab: NORMAL

## 2021-02-15 ENCOUNTER — HOSPITAL ENCOUNTER (EMERGENCY)
Facility: HOSPITAL | Age: 36
Discharge: HOME | End: 2021-02-15
Attending: EMERGENCY MEDICINE
Payer: COMMERCIAL

## 2021-02-15 VITALS
BODY MASS INDEX: 27.8 KG/M2 | DIASTOLIC BLOOD PRESSURE: 74 MMHG | RESPIRATION RATE: 18 BRPM | SYSTOLIC BLOOD PRESSURE: 107 MMHG | OXYGEN SATURATION: 97 % | WEIGHT: 173 LBS | TEMPERATURE: 98 F | HEIGHT: 66 IN | HEART RATE: 74 BPM

## 2021-02-15 DIAGNOSIS — R55 VASOVAGAL RESPONSE: Primary | ICD-10-CM

## 2021-02-15 DIAGNOSIS — M70.61 TROCHANTERIC BURSITIS OF RIGHT HIP: ICD-10-CM

## 2021-02-15 LAB
BACTERIA #/AREA URNS AUTO: NORMAL /HPF
BILIRUB UR QL STRIP: NEGATIVE
CLARITY UR REFRACT.AUTO: ABNORMAL
COLOR UR AUTO: YELLOW
GLUCOSE UR QL STRIP: NEGATIVE
HGB UR QL STRIP: NEGATIVE
HYALINE CASTS UR QL AUTO: 0 /LPF
KETONES UR QL STRIP: NEGATIVE
LEUKOCYTE ESTERASE UR QL STRIP: NEGATIVE
MICROSCOPIC COMMENT: NORMAL
NITRITE UR QL STRIP: NEGATIVE
PH UR STRIP: 6 [PH] (ref 5–8)
PROT UR QL STRIP: ABNORMAL
RBC #/AREA URNS AUTO: 2 /HPF (ref 0–4)
SP GR UR STRIP: 1.02 (ref 1–1.03)
SQUAMOUS #/AREA URNS AUTO: 6 /HPF
URN SPEC COLLECT METH UR: ABNORMAL
WBC #/AREA URNS AUTO: 3 /HPF (ref 0–5)

## 2021-02-15 PROCEDURE — 99284 EMERGENCY DEPT VISIT MOD MDM: CPT | Mod: ,,, | Performed by: EMERGENCY MEDICINE

## 2021-02-15 PROCEDURE — 81001 URINALYSIS AUTO W/SCOPE: CPT

## 2021-02-15 PROCEDURE — 99284 EMERGENCY DEPT VISIT MOD MDM: CPT

## 2021-02-15 PROCEDURE — 25000003 PHARM REV CODE 250: Performed by: EMERGENCY MEDICINE

## 2021-02-15 PROCEDURE — 99284 PR EMERGENCY DEPT VISIT,LEVEL IV: ICD-10-PCS | Mod: ,,, | Performed by: EMERGENCY MEDICINE

## 2021-02-15 RX ORDER — CYCLOBENZAPRINE HCL 10 MG
10 TABLET ORAL 2 TIMES DAILY PRN
Qty: 15 TABLET | Refills: 0 | Status: SHIPPED | OUTPATIENT
Start: 2021-02-15 | End: 2021-02-28

## 2021-02-15 RX ORDER — GABAPENTIN 100 MG/1
100 CAPSULE ORAL 2 TIMES DAILY PRN
Qty: 30 CAPSULE | Refills: 0 | Status: SHIPPED | OUTPATIENT
Start: 2021-02-15 | End: 2022-03-16

## 2021-02-15 RX ORDER — IBUPROFEN 600 MG/1
600 TABLET ORAL EVERY 6 HOURS PRN
Qty: 20 TABLET | Refills: 0 | Status: SHIPPED | OUTPATIENT
Start: 2021-02-15 | End: 2021-04-07 | Stop reason: SDUPTHER

## 2021-02-15 RX ORDER — GABAPENTIN 300 MG/1
300 CAPSULE ORAL
Status: DISCONTINUED | OUTPATIENT
Start: 2021-02-15 | End: 2021-02-15 | Stop reason: HOSPADM

## 2021-02-15 RX ORDER — CYCLOBENZAPRINE HCL 10 MG
10 TABLET ORAL
Status: COMPLETED | OUTPATIENT
Start: 2021-02-15 | End: 2021-02-15

## 2021-02-15 RX ADMIN — CYCLOBENZAPRINE 10 MG: 10 TABLET, FILM COATED ORAL at 12:02

## 2021-03-22 ENCOUNTER — PATIENT MESSAGE (OUTPATIENT)
Dept: ORTHOPEDICS | Facility: CLINIC | Age: 36
End: 2021-03-22

## 2021-03-23 ENCOUNTER — OFFICE VISIT (OUTPATIENT)
Dept: ORTHOPEDICS | Facility: CLINIC | Age: 36
End: 2021-03-23
Payer: COMMERCIAL

## 2021-03-23 ENCOUNTER — HOSPITAL ENCOUNTER (OUTPATIENT)
Dept: RADIOLOGY | Facility: HOSPITAL | Age: 36
Discharge: HOME | End: 2021-03-23
Attending: ORTHOPAEDIC SURGERY
Payer: COMMERCIAL

## 2021-03-23 VITALS — WEIGHT: 170.19 LBS | HEIGHT: 66 IN | BODY MASS INDEX: 27.35 KG/M2

## 2021-03-23 DIAGNOSIS — M54.16 LUMBAR RADICULOPATHY: ICD-10-CM

## 2021-03-23 DIAGNOSIS — M51.36 DDD (DEGENERATIVE DISC DISEASE), LUMBAR: ICD-10-CM

## 2021-03-23 DIAGNOSIS — M51.36 DDD (DEGENERATIVE DISC DISEASE), LUMBAR: Primary | ICD-10-CM

## 2021-03-23 PROCEDURE — 72120 XR LUMBAR SPINE AP AND LAT WITH FLEX/EXT: ICD-10-PCS | Mod: 26,,, | Performed by: RADIOLOGY

## 2021-03-23 PROCEDURE — 1125F AMNT PAIN NOTED PAIN PRSNT: CPT | Mod: S$GLB,,, | Performed by: ORTHOPAEDIC SURGERY

## 2021-03-23 PROCEDURE — 1125F PR PAIN SEVERITY QUANTIFIED, PAIN PRESENT: ICD-10-PCS | Mod: S$GLB,,, | Performed by: ORTHOPAEDIC SURGERY

## 2021-03-23 PROCEDURE — 72120 X-RAY BEND ONLY L-S SPINE: CPT | Mod: 26,,, | Performed by: RADIOLOGY

## 2021-03-23 PROCEDURE — 99204 PR OFFICE/OUTPT VISIT, NEW, LEVL IV, 45-59 MIN: ICD-10-PCS | Mod: S$GLB,,, | Performed by: ORTHOPAEDIC SURGERY

## 2021-03-23 PROCEDURE — 72100 X-RAY EXAM L-S SPINE 2/3 VWS: CPT | Mod: 26,,, | Performed by: RADIOLOGY

## 2021-03-23 PROCEDURE — 99204 OFFICE O/P NEW MOD 45 MIN: CPT | Mod: S$GLB,,, | Performed by: ORTHOPAEDIC SURGERY

## 2021-03-23 PROCEDURE — 72100 XR LUMBAR SPINE AP AND LAT WITH FLEX/EXT: ICD-10-PCS | Mod: 26,,, | Performed by: RADIOLOGY

## 2021-03-23 PROCEDURE — 99999 PR PBB SHADOW E&M-EST. PATIENT-LVL III: ICD-10-PCS | Mod: PBBFAC,,, | Performed by: ORTHOPAEDIC SURGERY

## 2021-03-23 PROCEDURE — 72120 X-RAY BEND ONLY L-S SPINE: CPT | Mod: TC

## 2021-03-23 PROCEDURE — 99999 PR PBB SHADOW E&M-EST. PATIENT-LVL III: CPT | Mod: PBBFAC,,, | Performed by: ORTHOPAEDIC SURGERY

## 2021-03-23 PROCEDURE — 3008F BODY MASS INDEX DOCD: CPT | Mod: CPTII,S$GLB,, | Performed by: ORTHOPAEDIC SURGERY

## 2021-03-23 PROCEDURE — 3008F PR BODY MASS INDEX (BMI) DOCUMENTED: ICD-10-PCS | Mod: CPTII,S$GLB,, | Performed by: ORTHOPAEDIC SURGERY

## 2021-03-23 RX ORDER — MELOXICAM 7.5 MG/1
7.5 TABLET ORAL DAILY
Qty: 30 TABLET | Refills: 0 | Status: SHIPPED | OUTPATIENT
Start: 2021-03-23 | End: 2021-04-07

## 2021-03-24 ENCOUNTER — HOSPITAL ENCOUNTER (OUTPATIENT)
Dept: RADIOLOGY | Facility: HOSPITAL | Age: 36
Discharge: HOME | End: 2021-03-24
Attending: ORTHOPAEDIC SURGERY
Payer: COMMERCIAL

## 2021-03-24 DIAGNOSIS — M54.16 LUMBAR RADICULOPATHY: ICD-10-CM

## 2021-03-24 DIAGNOSIS — M51.36 DDD (DEGENERATIVE DISC DISEASE), LUMBAR: ICD-10-CM

## 2021-03-24 PROCEDURE — 72148 MRI LUMBAR SPINE WITHOUT CONTRAST: ICD-10-PCS | Mod: 26,,, | Performed by: RADIOLOGY

## 2021-03-24 PROCEDURE — 72148 MRI LUMBAR SPINE W/O DYE: CPT | Mod: TC

## 2021-03-24 PROCEDURE — 72148 MRI LUMBAR SPINE W/O DYE: CPT | Mod: 26,,, | Performed by: RADIOLOGY

## 2021-03-25 ENCOUNTER — PATIENT MESSAGE (OUTPATIENT)
Dept: ORTHOPEDICS | Facility: CLINIC | Age: 36
End: 2021-03-25

## 2021-03-25 ENCOUNTER — OFFICE VISIT (OUTPATIENT)
Dept: ORTHOPEDICS | Facility: CLINIC | Age: 36
End: 2021-03-25
Payer: COMMERCIAL

## 2021-03-25 VITALS — BODY MASS INDEX: 27.35 KG/M2 | WEIGHT: 170.19 LBS | HEIGHT: 66 IN

## 2021-03-25 DIAGNOSIS — G89.29 CHRONIC BILATERAL LOW BACK PAIN WITH BILATERAL SCIATICA: Primary | ICD-10-CM

## 2021-03-25 DIAGNOSIS — M54.42 CHRONIC BILATERAL LOW BACK PAIN WITH BILATERAL SCIATICA: Primary | ICD-10-CM

## 2021-03-25 DIAGNOSIS — M54.41 CHRONIC BILATERAL LOW BACK PAIN WITH BILATERAL SCIATICA: Primary | ICD-10-CM

## 2021-03-25 PROCEDURE — 1125F AMNT PAIN NOTED PAIN PRSNT: CPT | Mod: ,,, | Performed by: ORTHOPAEDIC SURGERY

## 2021-03-25 PROCEDURE — 99213 PR OFFICE/OUTPT VISIT, EST, LEVL III, 20-29 MIN: ICD-10-PCS | Mod: 95,,, | Performed by: ORTHOPAEDIC SURGERY

## 2021-03-25 PROCEDURE — 3008F PR BODY MASS INDEX (BMI) DOCUMENTED: ICD-10-PCS | Mod: CPTII,,, | Performed by: ORTHOPAEDIC SURGERY

## 2021-03-25 PROCEDURE — 99213 OFFICE O/P EST LOW 20 MIN: CPT | Mod: 95,,, | Performed by: ORTHOPAEDIC SURGERY

## 2021-03-25 PROCEDURE — 1125F PR PAIN SEVERITY QUANTIFIED, PAIN PRESENT: ICD-10-PCS | Mod: ,,, | Performed by: ORTHOPAEDIC SURGERY

## 2021-03-25 PROCEDURE — 3008F BODY MASS INDEX DOCD: CPT | Mod: CPTII,,, | Performed by: ORTHOPAEDIC SURGERY

## 2021-04-07 ENCOUNTER — PATIENT MESSAGE (OUTPATIENT)
Dept: FAMILY MEDICINE | Facility: CLINIC | Age: 36
End: 2021-04-07

## 2021-04-07 ENCOUNTER — TELEPHONE (OUTPATIENT)
Dept: ORTHOPEDICS | Facility: CLINIC | Age: 36
End: 2021-04-07

## 2021-04-07 ENCOUNTER — PATIENT MESSAGE (OUTPATIENT)
Dept: ORTHOPEDICS | Facility: CLINIC | Age: 36
End: 2021-04-07

## 2021-04-07 RX ORDER — IBUPROFEN 600 MG/1
600 TABLET ORAL EVERY 6 HOURS PRN
Qty: 20 TABLET | Refills: 0 | Status: SHIPPED | OUTPATIENT
Start: 2021-04-07 | End: 2021-04-07 | Stop reason: SDUPTHER

## 2021-04-07 RX ORDER — CELECOXIB 200 MG/1
200 CAPSULE ORAL 2 TIMES DAILY
Qty: 60 CAPSULE | Refills: 0 | Status: SHIPPED | OUTPATIENT
Start: 2021-04-07 | End: 2021-05-20 | Stop reason: SDUPTHER

## 2021-04-07 RX ORDER — IBUPROFEN 600 MG/1
600 TABLET ORAL EVERY 6 HOURS PRN
Qty: 20 TABLET | Refills: 0 | Status: SHIPPED | OUTPATIENT
Start: 2021-04-07 | End: 2022-05-05 | Stop reason: SDUPTHER

## 2021-04-08 ENCOUNTER — PATIENT MESSAGE (OUTPATIENT)
Dept: ORTHOPEDICS | Facility: CLINIC | Age: 36
End: 2021-04-08

## 2021-04-29 ENCOUNTER — PATIENT MESSAGE (OUTPATIENT)
Dept: OBSTETRICS AND GYNECOLOGY | Facility: CLINIC | Age: 36
End: 2021-04-29

## 2021-04-29 RX ORDER — FLUCONAZOLE 150 MG/1
150 TABLET ORAL DAILY
Qty: 2 TABLET | Refills: 0 | Status: SHIPPED | OUTPATIENT
Start: 2021-04-29 | End: 2021-05-01

## 2021-05-03 ENCOUNTER — IMMUNIZATION (OUTPATIENT)
Dept: PHARMACY | Facility: CLINIC | Age: 36
End: 2021-05-03
Payer: COMMERCIAL

## 2021-05-03 DIAGNOSIS — Z23 NEED FOR VACCINATION: Primary | ICD-10-CM

## 2021-05-13 ENCOUNTER — CLINICAL SUPPORT (OUTPATIENT)
Dept: OTHER | Facility: CLINIC | Age: 36
End: 2021-05-13
Payer: COMMERCIAL

## 2021-05-13 DIAGNOSIS — Z00.8 ENCOUNTER FOR OTHER GENERAL EXAMINATION: ICD-10-CM

## 2021-05-14 VITALS — BODY MASS INDEX: 27.47 KG/M2 | HEIGHT: 66 IN

## 2021-05-14 LAB
HDLC SERPL-MCNC: 65 MG/DL
POC CHOLESTEROL, LDL (DOCK): 89 MG/DL
POC CHOLESTEROL, TOTAL: 164 MG/DL
POC GLUCOSE, FASTING: 74 MG/DL (ref 60–110)
TRIGL SERPL-MCNC: 48 MG/DL

## 2021-05-21 ENCOUNTER — PATIENT MESSAGE (OUTPATIENT)
Dept: OBSTETRICS AND GYNECOLOGY | Facility: CLINIC | Age: 36
End: 2021-05-21

## 2021-05-21 RX ORDER — CELECOXIB 200 MG/1
200 CAPSULE ORAL 2 TIMES DAILY
Qty: 60 CAPSULE | Refills: 0 | Status: SHIPPED | OUTPATIENT
Start: 2021-05-21 | End: 2021-08-06 | Stop reason: SDUPTHER

## 2021-05-31 ENCOUNTER — IMMUNIZATION (OUTPATIENT)
Dept: PHARMACY | Facility: CLINIC | Age: 36
End: 2021-05-31
Payer: COMMERCIAL

## 2021-05-31 DIAGNOSIS — Z23 NEED FOR VACCINATION: Primary | ICD-10-CM

## 2021-06-24 ENCOUNTER — OFFICE VISIT (OUTPATIENT)
Dept: FAMILY MEDICINE | Facility: CLINIC | Age: 36
End: 2021-06-24
Attending: FAMILY MEDICINE
Payer: COMMERCIAL

## 2021-06-24 VITALS
SYSTOLIC BLOOD PRESSURE: 118 MMHG | BODY MASS INDEX: 27.74 KG/M2 | WEIGHT: 172.63 LBS | HEART RATE: 94 BPM | HEIGHT: 66 IN | OXYGEN SATURATION: 100 % | DIASTOLIC BLOOD PRESSURE: 84 MMHG

## 2021-06-24 DIAGNOSIS — F41.9 ANXIETY: Primary | ICD-10-CM

## 2021-06-24 PROCEDURE — 3008F PR BODY MASS INDEX (BMI) DOCUMENTED: ICD-10-PCS | Mod: CPTII,S$GLB,, | Performed by: FAMILY MEDICINE

## 2021-06-24 PROCEDURE — 99214 OFFICE O/P EST MOD 30 MIN: CPT | Mod: S$GLB,,, | Performed by: FAMILY MEDICINE

## 2021-06-24 PROCEDURE — 99999 PR PBB SHADOW E&M-EST. PATIENT-LVL III: CPT | Mod: PBBFAC,,, | Performed by: FAMILY MEDICINE

## 2021-06-24 PROCEDURE — 1126F PR PAIN SEVERITY QUANTIFIED, NO PAIN PRESENT: ICD-10-PCS | Mod: S$GLB,,, | Performed by: FAMILY MEDICINE

## 2021-06-24 PROCEDURE — 99999 PR PBB SHADOW E&M-EST. PATIENT-LVL III: ICD-10-PCS | Mod: PBBFAC,,, | Performed by: FAMILY MEDICINE

## 2021-06-24 PROCEDURE — 99214 PR OFFICE/OUTPT VISIT, EST, LEVL IV, 30-39 MIN: ICD-10-PCS | Mod: S$GLB,,, | Performed by: FAMILY MEDICINE

## 2021-06-24 PROCEDURE — 3008F BODY MASS INDEX DOCD: CPT | Mod: CPTII,S$GLB,, | Performed by: FAMILY MEDICINE

## 2021-06-24 PROCEDURE — 1126F AMNT PAIN NOTED NONE PRSNT: CPT | Mod: S$GLB,,, | Performed by: FAMILY MEDICINE

## 2021-06-24 RX ORDER — SERTRALINE HYDROCHLORIDE 25 MG/1
25 TABLET, FILM COATED ORAL DAILY
Qty: 30 TABLET | Refills: 2 | Status: SHIPPED | OUTPATIENT
Start: 2021-06-24 | End: 2021-08-23 | Stop reason: SDUPTHER

## 2021-07-02 RX ORDER — HYDROCHLOROTHIAZIDE 12.5 MG/1
12.5 TABLET ORAL DAILY PRN
Qty: 30 TABLET | Refills: 11 | Status: SHIPPED | OUTPATIENT
Start: 2021-07-02

## 2021-08-06 RX ORDER — CELECOXIB 200 MG/1
200 CAPSULE ORAL 2 TIMES DAILY
Qty: 60 CAPSULE | Refills: 0 | Status: SHIPPED | OUTPATIENT
Start: 2021-08-06 | End: 2022-05-04 | Stop reason: SDUPTHER

## 2021-08-23 ENCOUNTER — PATIENT MESSAGE (OUTPATIENT)
Dept: FAMILY MEDICINE | Facility: CLINIC | Age: 36
End: 2021-08-23

## 2021-08-23 DIAGNOSIS — F41.9 ANXIETY: ICD-10-CM

## 2021-08-23 RX ORDER — SERTRALINE HYDROCHLORIDE 50 MG/1
50 TABLET, FILM COATED ORAL DAILY
Qty: 30 TABLET | Refills: 11 | Status: SHIPPED | OUTPATIENT
Start: 2021-08-23 | End: 2021-11-02 | Stop reason: SDUPTHER

## 2021-09-06 ENCOUNTER — PATIENT MESSAGE (OUTPATIENT)
Dept: DERMATOLOGY | Facility: CLINIC | Age: 36
End: 2021-09-06

## 2021-09-08 RX ORDER — HYDROXYZINE HYDROCHLORIDE 25 MG/1
25 TABLET, FILM COATED ORAL 3 TIMES DAILY PRN
Qty: 30 TABLET | Refills: 0 | Status: SHIPPED | OUTPATIENT
Start: 2021-09-08 | End: 2023-01-06 | Stop reason: SDUPTHER

## 2021-09-24 ENCOUNTER — PATIENT MESSAGE (OUTPATIENT)
Dept: OBSTETRICS AND GYNECOLOGY | Facility: CLINIC | Age: 36
End: 2021-09-24

## 2021-10-08 ENCOUNTER — OFFICE VISIT (OUTPATIENT)
Dept: INTERNAL MEDICINE | Facility: CLINIC | Age: 36
End: 2021-10-08
Payer: COMMERCIAL

## 2021-10-08 VITALS
TEMPERATURE: 98 F | HEIGHT: 66 IN | BODY MASS INDEX: 29.09 KG/M2 | SYSTOLIC BLOOD PRESSURE: 116 MMHG | HEART RATE: 75 BPM | WEIGHT: 181 LBS | DIASTOLIC BLOOD PRESSURE: 86 MMHG

## 2021-10-08 DIAGNOSIS — B95.8 STAPH INFECTION: Primary | ICD-10-CM

## 2021-10-08 PROCEDURE — 3079F DIAST BP 80-89 MM HG: CPT | Mod: CPTII,S$GLB,, | Performed by: INTERNAL MEDICINE

## 2021-10-08 PROCEDURE — 1160F PR REVIEW ALL MEDS BY PRESCRIBER/CLIN PHARMACIST DOCUMENTED: ICD-10-PCS | Mod: CPTII,S$GLB,, | Performed by: INTERNAL MEDICINE

## 2021-10-08 PROCEDURE — 3079F PR MOST RECENT DIASTOLIC BLOOD PRESSURE 80-89 MM HG: ICD-10-PCS | Mod: CPTII,S$GLB,, | Performed by: INTERNAL MEDICINE

## 2021-10-08 PROCEDURE — 3008F BODY MASS INDEX DOCD: CPT | Mod: CPTII,S$GLB,, | Performed by: INTERNAL MEDICINE

## 2021-10-08 PROCEDURE — 3074F SYST BP LT 130 MM HG: CPT | Mod: CPTII,S$GLB,, | Performed by: INTERNAL MEDICINE

## 2021-10-08 PROCEDURE — 3074F PR MOST RECENT SYSTOLIC BLOOD PRESSURE < 130 MM HG: ICD-10-PCS | Mod: CPTII,S$GLB,, | Performed by: INTERNAL MEDICINE

## 2021-10-08 PROCEDURE — 99999 PR PBB SHADOW E&M-EST. PATIENT-LVL IV: CPT | Mod: PBBFAC,,, | Performed by: INTERNAL MEDICINE

## 2021-10-08 PROCEDURE — 1159F PR MEDICATION LIST DOCUMENTED IN MEDICAL RECORD: ICD-10-PCS | Mod: CPTII,S$GLB,, | Performed by: INTERNAL MEDICINE

## 2021-10-08 PROCEDURE — 3008F PR BODY MASS INDEX (BMI) DOCUMENTED: ICD-10-PCS | Mod: CPTII,S$GLB,, | Performed by: INTERNAL MEDICINE

## 2021-10-08 PROCEDURE — 99213 OFFICE O/P EST LOW 20 MIN: CPT | Mod: S$GLB,,, | Performed by: INTERNAL MEDICINE

## 2021-10-08 PROCEDURE — 99999 PR PBB SHADOW E&M-EST. PATIENT-LVL IV: ICD-10-PCS | Mod: PBBFAC,,, | Performed by: INTERNAL MEDICINE

## 2021-10-08 PROCEDURE — 1159F MED LIST DOCD IN RCRD: CPT | Mod: CPTII,S$GLB,, | Performed by: INTERNAL MEDICINE

## 2021-10-08 PROCEDURE — 99213 PR OFFICE/OUTPT VISIT, EST, LEVL III, 20-29 MIN: ICD-10-PCS | Mod: S$GLB,,, | Performed by: INTERNAL MEDICINE

## 2021-10-08 PROCEDURE — 1160F RVW MEDS BY RX/DR IN RCRD: CPT | Mod: CPTII,S$GLB,, | Performed by: INTERNAL MEDICINE

## 2021-10-08 RX ORDER — SULFAMETHOXAZOLE AND TRIMETHOPRIM 800; 160 MG/1; MG/1
1 TABLET ORAL 2 TIMES DAILY
Qty: 20 TABLET | Refills: 0 | Status: SHIPPED | OUTPATIENT
Start: 2021-10-08 | End: 2021-12-02 | Stop reason: SDUPTHER

## 2021-10-11 ENCOUNTER — PATIENT MESSAGE (OUTPATIENT)
Dept: GASTROENTEROLOGY | Facility: CLINIC | Age: 36
End: 2021-10-11

## 2021-10-12 ENCOUNTER — TELEPHONE (OUTPATIENT)
Dept: GASTROENTEROLOGY | Facility: CLINIC | Age: 36
End: 2021-10-12

## 2021-10-13 ENCOUNTER — PATIENT MESSAGE (OUTPATIENT)
Dept: INTERNAL MEDICINE | Facility: CLINIC | Age: 36
End: 2021-10-13

## 2021-10-13 ENCOUNTER — PATIENT MESSAGE (OUTPATIENT)
Dept: GASTROENTEROLOGY | Facility: CLINIC | Age: 36
End: 2021-10-13
Payer: COMMERCIAL

## 2021-10-13 DIAGNOSIS — K59.04 CHRONIC IDIOPATHIC CONSTIPATION: ICD-10-CM

## 2021-10-13 RX ORDER — CLOTRIMAZOLE AND BETAMETHASONE DIPROPIONATE 10; .64 MG/G; MG/G
CREAM TOPICAL 2 TIMES DAILY
Qty: 45 G | Refills: 0 | Status: SHIPPED | OUTPATIENT
Start: 2021-10-13 | End: 2021-10-13 | Stop reason: SDUPTHER

## 2021-10-14 DIAGNOSIS — R03.0 ELEVATED BP WITHOUT DIAGNOSIS OF HYPERTENSION: ICD-10-CM

## 2021-10-14 DIAGNOSIS — G44.219 EPISODIC TENSION-TYPE HEADACHE, NOT INTRACTABLE: ICD-10-CM

## 2021-10-14 DIAGNOSIS — K59.04 CHRONIC IDIOPATHIC CONSTIPATION: ICD-10-CM

## 2021-10-14 RX ORDER — PROPRANOLOL HYDROCHLORIDE 10 MG/1
10 TABLET ORAL 2 TIMES DAILY
Qty: 60 TABLET | Refills: 11 | Status: SHIPPED | OUTPATIENT
Start: 2021-10-14 | End: 2023-01-06 | Stop reason: SDUPTHER

## 2021-10-14 RX ORDER — CLOTRIMAZOLE AND BETAMETHASONE DIPROPIONATE 10; .64 MG/G; MG/G
CREAM TOPICAL 2 TIMES DAILY
Qty: 45 G | Refills: 0 | Status: SHIPPED | OUTPATIENT
Start: 2021-10-14

## 2021-11-04 ENCOUNTER — PATIENT MESSAGE (OUTPATIENT)
Dept: DERMATOLOGY | Facility: CLINIC | Age: 36
End: 2021-11-04

## 2021-11-04 ENCOUNTER — OFFICE VISIT (OUTPATIENT)
Dept: DERMATOLOGY | Facility: CLINIC | Age: 36
End: 2021-11-04
Payer: COMMERCIAL

## 2021-11-04 ENCOUNTER — PATIENT MESSAGE (OUTPATIENT)
Dept: DERMATOLOGY | Facility: CLINIC | Age: 36
End: 2021-11-04
Payer: COMMERCIAL

## 2021-11-04 DIAGNOSIS — L70.0 ACNE VULGARIS: Primary | ICD-10-CM

## 2021-11-04 PROCEDURE — 99214 PR OFFICE/OUTPT VISIT, EST, LEVL IV, 30-39 MIN: ICD-10-PCS | Mod: 95,,, | Performed by: DERMATOLOGY

## 2021-11-04 PROCEDURE — 1159F MED LIST DOCD IN RCRD: CPT | Mod: CPTII,95,, | Performed by: DERMATOLOGY

## 2021-11-04 PROCEDURE — 1160F RVW MEDS BY RX/DR IN RCRD: CPT | Mod: CPTII,95,, | Performed by: DERMATOLOGY

## 2021-11-04 PROCEDURE — 99214 OFFICE O/P EST MOD 30 MIN: CPT | Mod: 95,,, | Performed by: DERMATOLOGY

## 2021-11-04 PROCEDURE — 1159F PR MEDICATION LIST DOCUMENTED IN MEDICAL RECORD: ICD-10-PCS | Mod: CPTII,95,, | Performed by: DERMATOLOGY

## 2021-11-04 PROCEDURE — 1160F PR REVIEW ALL MEDS BY PRESCRIBER/CLIN PHARMACIST DOCUMENTED: ICD-10-PCS | Mod: CPTII,95,, | Performed by: DERMATOLOGY

## 2021-11-04 RX ORDER — TRIFAROTENE 50 UG/G
1 CREAM TOPICAL NIGHTLY
Qty: 45 G | Refills: 5 | Status: SHIPPED | OUTPATIENT
Start: 2021-11-04

## 2021-11-04 RX ORDER — SPIRONOLACTONE 50 MG/1
TABLET, FILM COATED ORAL
Qty: 60 TABLET | Refills: 3 | Status: SHIPPED | OUTPATIENT
Start: 2021-11-04 | End: 2023-01-06

## 2021-11-08 ENCOUNTER — PATIENT MESSAGE (OUTPATIENT)
Dept: PHARMACY | Facility: CLINIC | Age: 36
End: 2021-11-08
Payer: COMMERCIAL

## 2021-11-08 ENCOUNTER — TELEPHONE (OUTPATIENT)
Dept: PHARMACY | Facility: CLINIC | Age: 36
End: 2021-11-08
Payer: COMMERCIAL

## 2021-11-08 ENCOUNTER — TELEPHONE (OUTPATIENT)
Dept: DERMATOLOGY | Facility: CLINIC | Age: 36
End: 2021-11-08
Payer: COMMERCIAL

## 2021-11-16 ENCOUNTER — OFFICE VISIT (OUTPATIENT)
Dept: DERMATOLOGY | Facility: CLINIC | Age: 36
End: 2021-11-16
Payer: COMMERCIAL

## 2021-11-16 DIAGNOSIS — L70.0 ACNE VULGARIS: Primary | ICD-10-CM

## 2021-11-16 PROCEDURE — 99213 OFFICE O/P EST LOW 20 MIN: CPT | Mod: S$GLB,,, | Performed by: DERMATOLOGY

## 2021-11-16 PROCEDURE — 99999 PR PBB SHADOW E&M-EST. PATIENT-LVL III: ICD-10-PCS | Mod: PBBFAC,,, | Performed by: DERMATOLOGY

## 2021-11-16 PROCEDURE — 99999 PR PBB SHADOW E&M-EST. PATIENT-LVL III: CPT | Mod: PBBFAC,,, | Performed by: DERMATOLOGY

## 2021-11-16 PROCEDURE — 99213 PR OFFICE/OUTPT VISIT, EST, LEVL III, 20-29 MIN: ICD-10-PCS | Mod: S$GLB,,, | Performed by: DERMATOLOGY

## 2021-11-16 PROCEDURE — 1160F RVW MEDS BY RX/DR IN RCRD: CPT | Mod: CPTII,S$GLB,, | Performed by: DERMATOLOGY

## 2021-11-16 PROCEDURE — 1160F PR REVIEW ALL MEDS BY PRESCRIBER/CLIN PHARMACIST DOCUMENTED: ICD-10-PCS | Mod: CPTII,S$GLB,, | Performed by: DERMATOLOGY

## 2021-11-16 PROCEDURE — 1159F PR MEDICATION LIST DOCUMENTED IN MEDICAL RECORD: ICD-10-PCS | Mod: CPTII,S$GLB,, | Performed by: DERMATOLOGY

## 2021-11-16 PROCEDURE — 1159F MED LIST DOCD IN RCRD: CPT | Mod: CPTII,S$GLB,, | Performed by: DERMATOLOGY

## 2021-11-17 ENCOUNTER — PATIENT MESSAGE (OUTPATIENT)
Dept: OBSTETRICS AND GYNECOLOGY | Facility: CLINIC | Age: 36
End: 2021-11-17
Payer: COMMERCIAL

## 2021-11-18 ENCOUNTER — PATIENT MESSAGE (OUTPATIENT)
Dept: OBSTETRICS AND GYNECOLOGY | Facility: CLINIC | Age: 36
End: 2021-11-18
Payer: COMMERCIAL

## 2021-11-23 ENCOUNTER — PATIENT OUTREACH (OUTPATIENT)
Dept: ADMINISTRATIVE | Facility: OTHER | Age: 36
End: 2021-11-23
Payer: COMMERCIAL

## 2021-12-02 ENCOUNTER — OFFICE VISIT (OUTPATIENT)
Dept: OBSTETRICS AND GYNECOLOGY | Facility: CLINIC | Age: 36
End: 2021-12-02
Payer: COMMERCIAL

## 2021-12-02 VITALS
BODY MASS INDEX: 28.7 KG/M2 | WEIGHT: 178.56 LBS | DIASTOLIC BLOOD PRESSURE: 90 MMHG | SYSTOLIC BLOOD PRESSURE: 130 MMHG | HEIGHT: 66 IN

## 2021-12-02 DIAGNOSIS — L02.91 ABSCESS: Primary | ICD-10-CM

## 2021-12-02 DIAGNOSIS — L73.9 FOLLICULITIS: ICD-10-CM

## 2021-12-02 LAB
B-HCG UR QL: NEGATIVE
CTP QC/QA: YES

## 2021-12-02 PROCEDURE — 99213 PR OFFICE/OUTPT VISIT, EST, LEVL III, 20-29 MIN: ICD-10-PCS | Mod: 25,S$GLB,, | Performed by: OBSTETRICS & GYNECOLOGY

## 2021-12-02 PROCEDURE — 81025 URINE PREGNANCY TEST: CPT | Mod: S$GLB,,, | Performed by: OBSTETRICS & GYNECOLOGY

## 2021-12-02 PROCEDURE — 99999 PR PBB SHADOW E&M-EST. PATIENT-LVL III: ICD-10-PCS | Mod: PBBFAC,,, | Performed by: OBSTETRICS & GYNECOLOGY

## 2021-12-02 PROCEDURE — 81025 POCT URINE PREGNANCY: ICD-10-PCS | Mod: S$GLB,,, | Performed by: OBSTETRICS & GYNECOLOGY

## 2021-12-02 PROCEDURE — 99213 OFFICE O/P EST LOW 20 MIN: CPT | Mod: 25,S$GLB,, | Performed by: OBSTETRICS & GYNECOLOGY

## 2021-12-02 PROCEDURE — 99999 PR PBB SHADOW E&M-EST. PATIENT-LVL III: CPT | Mod: PBBFAC,,, | Performed by: OBSTETRICS & GYNECOLOGY

## 2021-12-02 RX ORDER — SULFAMETHOXAZOLE AND TRIMETHOPRIM 800; 160 MG/1; MG/1
1 TABLET ORAL 2 TIMES DAILY
Qty: 14 TABLET | Refills: 0 | Status: SHIPPED | OUTPATIENT
Start: 2021-12-02 | End: 2021-12-09

## 2021-12-16 ENCOUNTER — PATIENT MESSAGE (OUTPATIENT)
Dept: FAMILY MEDICINE | Facility: CLINIC | Age: 36
End: 2021-12-16
Payer: COMMERCIAL

## 2021-12-16 DIAGNOSIS — F41.9 ANXIETY: ICD-10-CM

## 2021-12-16 RX ORDER — SERTRALINE HYDROCHLORIDE 50 MG/1
75 TABLET, FILM COATED ORAL DAILY
Qty: 45 TABLET | Refills: 11 | Status: SHIPPED | OUTPATIENT
Start: 2021-12-16 | End: 2023-01-06 | Stop reason: SDUPTHER

## 2021-12-24 ENCOUNTER — PATIENT MESSAGE (OUTPATIENT)
Dept: DERMATOLOGY | Facility: CLINIC | Age: 36
End: 2021-12-24
Payer: COMMERCIAL

## 2022-01-04 ENCOUNTER — PATIENT MESSAGE (OUTPATIENT)
Dept: OBSTETRICS AND GYNECOLOGY | Facility: CLINIC | Age: 37
End: 2022-01-04
Payer: COMMERCIAL

## 2022-01-04 DIAGNOSIS — N76.0 ACUTE VAGINITIS: Primary | ICD-10-CM

## 2022-01-04 RX ORDER — FLUCONAZOLE 150 MG/1
150 TABLET ORAL DAILY
Qty: 2 TABLET | Refills: 0 | Status: SHIPPED | OUTPATIENT
Start: 2022-01-04 | End: 2022-02-13

## 2022-01-07 ENCOUNTER — IMMUNIZATION (OUTPATIENT)
Dept: INTERNAL MEDICINE | Facility: CLINIC | Age: 37
End: 2022-01-07
Payer: COMMERCIAL

## 2022-01-07 DIAGNOSIS — Z23 NEED FOR VACCINATION: Primary | ICD-10-CM

## 2022-01-07 PROCEDURE — 0064A COVID-19, MRNA, LNP-S, PF, 100 MCG/0.25 ML DOSE VACCINE (MODERNA BOOSTER): CPT | Mod: CV19,PBBFAC | Performed by: INTERNAL MEDICINE

## 2022-01-07 PROCEDURE — 91306 COVID-19, MRNA, LNP-S, PF, 100 MCG/0.25 ML DOSE VACCINE (MODERNA BOOSTER): CPT | Mod: PBBFAC | Performed by: INTERNAL MEDICINE

## 2022-02-02 ENCOUNTER — CLINICAL SUPPORT (OUTPATIENT)
Dept: OTHER | Facility: CLINIC | Age: 37
End: 2022-02-02

## 2022-02-02 DIAGNOSIS — Z00.8 ENCOUNTER FOR OTHER GENERAL EXAMINATION: ICD-10-CM

## 2022-02-03 VITALS — HEIGHT: 66 IN | BODY MASS INDEX: 28.82 KG/M2

## 2022-02-03 LAB
HDLC SERPL-MCNC: 60 MG/DL
POC CHOLESTEROL, TOTAL: 163 MG/DL
POC GLUCOSE, FASTING: 77 MG/DL (ref 60–110)
TRIGL SERPL-MCNC: 44 MG/DL

## 2022-02-09 ENCOUNTER — OFFICE VISIT (OUTPATIENT)
Dept: GASTROENTEROLOGY | Facility: CLINIC | Age: 37
End: 2022-02-09
Payer: COMMERCIAL

## 2022-02-09 VITALS — BODY MASS INDEX: 30.65 KG/M2 | WEIGHT: 190.69 LBS | HEIGHT: 66 IN

## 2022-02-09 DIAGNOSIS — K58.1 IRRITABLE BOWEL SYNDROME WITH CONSTIPATION: Primary | ICD-10-CM

## 2022-02-09 PROCEDURE — 99214 OFFICE O/P EST MOD 30 MIN: CPT | Mod: S$GLB,,, | Performed by: NURSE PRACTITIONER

## 2022-02-09 PROCEDURE — 3008F PR BODY MASS INDEX (BMI) DOCUMENTED: ICD-10-PCS | Mod: CPTII,S$GLB,, | Performed by: NURSE PRACTITIONER

## 2022-02-09 PROCEDURE — 99999 PR PBB SHADOW E&M-EST. PATIENT-LVL III: ICD-10-PCS | Mod: PBBFAC,,, | Performed by: NURSE PRACTITIONER

## 2022-02-09 PROCEDURE — 99214 PR OFFICE/OUTPT VISIT, EST, LEVL IV, 30-39 MIN: ICD-10-PCS | Mod: S$GLB,,, | Performed by: NURSE PRACTITIONER

## 2022-02-09 PROCEDURE — 1159F PR MEDICATION LIST DOCUMENTED IN MEDICAL RECORD: ICD-10-PCS | Mod: CPTII,S$GLB,, | Performed by: NURSE PRACTITIONER

## 2022-02-09 PROCEDURE — 1159F MED LIST DOCD IN RCRD: CPT | Mod: CPTII,S$GLB,, | Performed by: NURSE PRACTITIONER

## 2022-02-09 PROCEDURE — 99999 PR PBB SHADOW E&M-EST. PATIENT-LVL III: CPT | Mod: PBBFAC,,, | Performed by: NURSE PRACTITIONER

## 2022-02-09 PROCEDURE — 3008F BODY MASS INDEX DOCD: CPT | Mod: CPTII,S$GLB,, | Performed by: NURSE PRACTITIONER

## 2022-02-09 RX ORDER — LUBIPROSTONE 8 UG/1
8 CAPSULE ORAL 2 TIMES DAILY
Qty: 60 CAPSULE | Refills: 2 | Status: SHIPPED | OUTPATIENT
Start: 2022-02-09 | End: 2022-03-13

## 2022-02-09 NOTE — PROGRESS NOTES
GASTROENTEROLOGY CLINIC NOTE    Chief Complaint: The encounter diagnosis was Irritable bowel syndrome with constipation.  Referring provider/PCP: Babatunde Sunshine MD    HPI:  Doroteo Sun is a 36 y.o. female who is a new patient to me with a PMH that is significant for Acne, Anxiety, Chronic constipation, and Hypertension.  Patient is new to me but was previously established with Dr. Ho.  She is here to re-establish care for constipation.  I am the fourth provider patient will be seeing for this problem.  Constipation is not a new problem as it has been ongoing for the last 5-6 years.  She is currently taking Linzess 290mcg and was managing constipation until about 2 weeks ago.  She has 3 bowel movements per week that are described as small, hard li.  Bowel movements are accompanied by increased straining.  Last bowel movement where she felt completely empty was about 9 days ago.  She takes the Linzess 4 days a week.  Generally, she does not take the Linzess on days she has to work as it has caused frequent and loose bowel movements. She would like to try a different medication for constipation.  She has tried OCT laxatives, Miralax, Linzess, and Trulance I the past.  Trulance has helped with constipation but it is no longer covered by insurance. Reports increasing water and fiber intake also.     Prior Upper Endoscopy: No  Prior Colonoscopy: No  Family h/o Colon Cancer: No  Family h/o Crohn's Disease or Ulcerative Colitis: No  Abdominal Surgeries: No    GI ROS:  Reflux: No  Dysphagia: No   Constipation: Yes  Diarrhea: No  Rectal bleeding/Melena/hematemesis: No  NSAIDs: No  Anticoagulation or Antiplatelet: No      Review of Systems   Constitutional: Negative for weight loss.   HENT: Negative for sore throat.    Eyes: Negative for blurred vision.   Respiratory: Negative for cough.    Cardiovascular: Negative for chest pain.   Gastrointestinal: Positive for constipation. Negative for abdominal pain,  blood in stool, diarrhea, heartburn, melena, nausea and vomiting.   Genitourinary: Negative for dysuria.   Musculoskeletal: Negative for myalgias.   Skin: Negative for rash.   Neurological: Negative for headaches.   Endo/Heme/Allergies: Negative for environmental allergies.   Psychiatric/Behavioral: Negative for suicidal ideas. The patient is not nervous/anxious.        Past Medical History: has a past medical history of Acne, Anxiety, Chronic constipation, and Hypertension.    Past Surgical History: has no past surgical history on file.    Family History:family history includes Hypertension in her maternal grandmother.    Allergies:   Review of patient's allergies indicates:   Allergen Reactions    Apple        Social History: reports that she has never smoked. She has never used smokeless tobacco. She reports current alcohol use. She reports that she does not use drugs.    Home medications:   Current Outpatient Medications on File Prior to Visit   Medication Sig Dispense Refill    celecoxib (CELEBREX) 200 MG capsule Take 1 capsule (200 mg total) by mouth 2 (two) times daily. 60 capsule 0    clotrimazole-betamethasone 1-0.05% (LOTRISONE) cream Apply topically 2 (two) times daily. 45 g 0    hydroCHLOROthiazide (HYDRODIURIL) 12.5 MG Tab Take 1 tablet (12.5 mg total) by mouth daily as needed (for fluid retention in legs). 30 tablet 11    hydrOXYzine HCL (ATARAX) 25 MG tablet Take 1 tablet (25 mg total) by mouth 3 (three) times daily as needed. 30 tablet 0    linaCLOtide (LINZESS) 290 mcg Cap capsule Take 1 capsule (290 mcg total) by mouth once daily. 90 capsule 3    linaCLOtide (LINZESS) 290 mcg Cap capsule Take 1 capsule (290 mcg total) by mouth once daily. 90 capsule 3    propranoloL (INDERAL) 10 MG tablet Take 1 tablet (10 mg total) by mouth 2 (two) times daily. 60 tablet 11    sertraline (ZOLOFT) 50 MG tablet Take 1.5 tablets (75 mg total) by mouth once daily. 45 tablet 11    spironolactone (ALDACTONE)  "50 MG tablet Start with 1 tablet by mouth once a day, then increase to 2 tablets by mouth once a day as tolerated (Patient taking differently: Start with 1 tablet by mouth once a day, then increase to 2 tablets by mouth once a day as tolerated) 60 tablet 3    triamcinolone acetonide 0.1% (KENALOG) 0.1 % cream       trifarotene (AKLIEF) 0.005 % Crea Apply 1 Pump topically every evening. To entire face 45 g 5    gabapentin (NEURONTIN) 100 MG capsule Take 1 capsule (100 mg total) by mouth 2 (two) times daily as needed. (Patient not taking: Reported on 2/9/2022) 30 capsule 0    ibuprofen (ADVIL,MOTRIN) 600 MG tablet Take 1 tablet (600 mg total) by mouth every 6 (six) hours as needed for Pain. (Patient not taking: Reported on 2/9/2022) 20 tablet 0     No current facility-administered medications on file prior to visit.       Vital signs:  Ht 5' 6" (1.676 m)   Wt 86.5 kg (190 lb 11.2 oz)   LMP 01/31/2022   BMI 30.78 kg/m²     Physical Exam  Vitals reviewed.   Constitutional:       General: She is not in acute distress.     Appearance: Normal appearance. She is not ill-appearing.   HENT:      Head: Normocephalic.   Cardiovascular:      Rate and Rhythm: Normal rate and regular rhythm.      Heart sounds: Normal heart sounds. No murmur heard.      Pulmonary:      Effort: Pulmonary effort is normal. No respiratory distress.      Breath sounds: Normal breath sounds.   Chest:      Chest wall: No tenderness.   Abdominal:      General: Bowel sounds are normal. There is no distension.      Palpations: Abdomen is soft.      Tenderness: There is no abdominal tenderness. Negative signs include Alfaro's sign.      Hernia: No hernia is present.   Skin:     General: Skin is warm.   Neurological:      Mental Status: She is alert and oriented to person, place, and time.   Psychiatric:         Mood and Affect: Mood normal.         Behavior: Behavior normal.         Routine labs:  Lab Results   Component Value Date    WBC 4.82 " 07/22/2019    HGB 11.5 (L) 07/22/2019    HCT 38.3 07/22/2019    MCV 84 07/22/2019     07/22/2019     Lab Results   Component Value Date    INR 1.0 11/16/2016     No results found for: IRON, FERRITIN, TIBC, FESATURATED  Lab Results   Component Value Date     05/27/2019    K 4.1 05/27/2019     05/27/2019    CO2 28 05/27/2019    BUN 19 05/27/2019    CREATININE 0.8 05/27/2019     Lab Results   Component Value Date    ALBUMIN 3.5 07/22/2019    ALT 17 07/22/2019    AST 17 07/22/2019    ALKPHOS 71 07/22/2019    BILITOT 1.0 07/22/2019     No results found for: GLUCOSE  Lab Results   Component Value Date    TSH 0.997 04/29/2019     Lab Results   Component Value Date    CALCIUM 9.8 05/27/2019       Imaging:      I have reviewed prior labs, imaging, and notes.      Assessment:  1. Irritable bowel syndrome with constipation        Plan:  Orders Placed This Encounter    X-Ray Abdomen AP 1 View    POCT URINE PREGNANCY    lubiprostone (AMITIZA) 8 MCG Cap     Abdominal xray to evaluate stool burden.  Amitiza 8mcg BID  Continue increased water intake  Consider adding daily fiber supplement.  Consider increasing Amitiza dose in 8mcg is ineffective.  Recommend colonoscopy d/t prolonged h/o constipation. Patient declined.       Plan of care discussed with patient who is in agreement and verbalized understanding.       Follow Up: As Needed Pending Above Workup          Nydia Anderson, APRN,FNP-BC  [facility]

## 2022-02-10 ENCOUNTER — PATIENT MESSAGE (OUTPATIENT)
Dept: GASTROENTEROLOGY | Facility: CLINIC | Age: 37
End: 2022-02-10
Payer: COMMERCIAL

## 2022-02-10 ENCOUNTER — HOSPITAL ENCOUNTER (OUTPATIENT)
Dept: RADIOLOGY | Facility: HOSPITAL | Age: 37
Discharge: HOME | End: 2022-02-10
Attending: NURSE PRACTITIONER
Payer: COMMERCIAL

## 2022-02-10 DIAGNOSIS — K58.1 IRRITABLE BOWEL SYNDROME WITH CONSTIPATION: ICD-10-CM

## 2022-02-10 PROCEDURE — 74018 XR ABDOMEN AP 1 VIEW: ICD-10-PCS | Mod: 26,,, | Performed by: RADIOLOGY

## 2022-02-10 PROCEDURE — 74018 RADEX ABDOMEN 1 VIEW: CPT | Mod: 26,,, | Performed by: RADIOLOGY

## 2022-02-10 PROCEDURE — 74018 RADEX ABDOMEN 1 VIEW: CPT | Mod: TC

## 2022-02-17 ENCOUNTER — PATIENT MESSAGE (OUTPATIENT)
Dept: GASTROENTEROLOGY | Facility: CLINIC | Age: 37
End: 2022-02-17
Payer: COMMERCIAL

## 2022-03-16 ENCOUNTER — OFFICE VISIT (OUTPATIENT)
Dept: INTERNAL MEDICINE | Facility: CLINIC | Age: 37
End: 2022-03-16
Payer: COMMERCIAL

## 2022-03-16 ENCOUNTER — OFFICE VISIT (OUTPATIENT)
Dept: FAMILY MEDICINE | Facility: CLINIC | Age: 37
End: 2022-03-16
Payer: COMMERCIAL

## 2022-03-16 ENCOUNTER — TELEPHONE (OUTPATIENT)
Dept: FAMILY MEDICINE | Facility: CLINIC | Age: 37
End: 2022-03-16
Payer: COMMERCIAL

## 2022-03-16 VITALS
BODY MASS INDEX: 31.04 KG/M2 | HEIGHT: 66 IN | WEIGHT: 193.13 LBS | SYSTOLIC BLOOD PRESSURE: 118 MMHG | DIASTOLIC BLOOD PRESSURE: 83 MMHG | TEMPERATURE: 98 F

## 2022-03-16 DIAGNOSIS — J06.9 UPPER RESPIRATORY TRACT INFECTION, UNSPECIFIED TYPE: Primary | ICD-10-CM

## 2022-03-16 DIAGNOSIS — B34.9 VIRAL SYNDROME: ICD-10-CM

## 2022-03-16 DIAGNOSIS — J98.8 RESPIRATORY INFECTION: Primary | ICD-10-CM

## 2022-03-16 PROCEDURE — 99999 PR PBB SHADOW E&M-EST. PATIENT-LVL IV: CPT | Mod: PBBFAC,,, | Performed by: STUDENT IN AN ORGANIZED HEALTH CARE EDUCATION/TRAINING PROGRAM

## 2022-03-16 PROCEDURE — 99213 PR OFFICE/OUTPT VISIT, EST, LEVL III, 20-29 MIN: ICD-10-PCS | Mod: S$GLB,,, | Performed by: STUDENT IN AN ORGANIZED HEALTH CARE EDUCATION/TRAINING PROGRAM

## 2022-03-16 PROCEDURE — 99213 OFFICE O/P EST LOW 20 MIN: CPT | Mod: S$GLB,,, | Performed by: STUDENT IN AN ORGANIZED HEALTH CARE EDUCATION/TRAINING PROGRAM

## 2022-03-16 PROCEDURE — 99213 OFFICE O/P EST LOW 20 MIN: CPT | Mod: 95,,, | Performed by: FAMILY MEDICINE

## 2022-03-16 PROCEDURE — 99999 PR PBB SHADOW E&M-EST. PATIENT-LVL IV: ICD-10-PCS | Mod: PBBFAC,,, | Performed by: STUDENT IN AN ORGANIZED HEALTH CARE EDUCATION/TRAINING PROGRAM

## 2022-03-16 PROCEDURE — 99213 PR OFFICE/OUTPT VISIT, EST, LEVL III, 20-29 MIN: ICD-10-PCS | Mod: 95,,, | Performed by: FAMILY MEDICINE

## 2022-03-16 RX ORDER — AZITHROMYCIN 250 MG/1
250 TABLET, FILM COATED ORAL DAILY
Qty: 6 TABLET | Refills: 0 | Status: SHIPPED | OUTPATIENT
Start: 2022-03-18 | End: 2022-03-23

## 2022-03-16 RX ORDER — FLUTICASONE PROPIONATE 50 MCG
1 SPRAY, SUSPENSION (ML) NASAL DAILY
Qty: 16 G | Refills: 0 | Status: SHIPPED | OUTPATIENT
Start: 2022-03-16 | End: 2023-01-13

## 2022-03-16 RX ORDER — PREDNISONE 10 MG/1
10 TABLET ORAL 2 TIMES DAILY
Qty: 10 TABLET | Refills: 0 | Status: SHIPPED | OUTPATIENT
Start: 2022-03-16 | End: 2022-03-21

## 2022-03-16 RX ORDER — GUAIFENESIN AND DEXTROMETHORPHAN HYDROBROMIDE 600; 30 MG/1; MG/1
1 TABLET, EXTENDED RELEASE ORAL 2 TIMES DAILY
Qty: 15 TABLET | Refills: 0 | Status: SHIPPED | OUTPATIENT
Start: 2022-03-16 | End: 2022-03-21

## 2022-03-16 NOTE — PATIENT INSTRUCTIONS
Cough:  Nasal Saline:     A. Tilt head back and squirt into nostril 2-3 times until you taste saline in back of throat. Spit, and blow nose. Do this 4 times, alternating right and left nostril. Do this routine 2-3 times per day- at least once in the shower.    Gargle with warm salt water 2-3 times per day. About 1 cup, fairly warm, fairly salty    Benadryl/cetirizine daily     Flonase 2 sprays each nostril daily    Delsym cough syrup twice a day    Continue all of these things for 2-3 weeks    Headache/Body Aches/Fever:  Can take ibuprofen and tylenol and alternate every 3 hours (Ex: Tylenol 500mg at 6am, Ibuprofen 600mg at 9am, Tylenol at 12, etc)     Chest congestion:  - mucinex, drink with plenty of water    Nasal symptoms:  - pseudoephedrine       Call or email me in 2 weeks and let me know how you are doing.

## 2022-03-16 NOTE — PROGRESS NOTES
Internal Medicine Clinic Note  2022    Subjective   Patient Name: Doroteo Sun  : 1985    Chief Complaint:   Chief Complaint   Patient presents with    nasal drip    Sinus Problem       History of Present Illness:  Ms. Doroteo Sun is a 36 y.o. female with PMH anxiety presenting as a same day visit for 1 day hx of runny nose, watery eyes, and facial pressure. She has a young son who had a runny nose recently. Since her symptom onset, she has experienced clear rhinorrhea, right sided watery eye, and right sided facial pressure. She denies fevers, dyspnea, productive cough, ear pain, headache, sore throat. She has covid 1 year ago and has been vaccinated and boosted against covid. Since symptom onset she has tried acetaminophen cold/flu, tylenol sinus, and zyrtec which have not helped her symptoms.     Review of Systems   Constitutional: Positive for malaise/fatigue. Negative for fever.   HENT: Positive for congestion and sinus pain. Negative for ear pain and sore throat.    Eyes: Positive for discharge. Negative for pain.   Respiratory: Negative for shortness of breath.    Cardiovascular: Negative for chest pain.   Gastrointestinal: Negative for diarrhea.   Musculoskeletal: Negative for myalgias.   Skin: Negative for rash.       PAST HISTORY:     Past Medical History:   Diagnosis Date    Acne     Anxiety     Chronic constipation     Hypertension        No past surgical history on file.    Family History   Problem Relation Age of Onset    Hypertension Maternal Grandmother     Cancer Neg Hx     Diabetes Neg Hx     Breast cancer Neg Hx     Colon cancer Neg Hx     Ovarian cancer Neg Hx     Cervical cancer Neg Hx     Endometrial cancer Neg Hx     Vaginal cancer Neg Hx     Melanoma Neg Hx          MEDICATIONS & ALLERGIES:       Current Outpatient Medications:     celecoxib (CELEBREX) 200 MG capsule, Take 1 capsule (200 mg total) by mouth 2 (two) times daily., Disp: 60 capsule, Rfl:  "0    clotrimazole-betamethasone 1-0.05% (LOTRISONE) cream, Apply topically 2 (two) times daily., Disp: 45 g, Rfl: 0    dextromethorphan-guaiFENesin  mg (MUCINEX DM)  mg per 12 hr tablet, Take 1 tablet by mouth 2 (two) times daily. for 5 days, Disp: 15 tablet, Rfl: 0    fluticasone propionate (FLONASE) 50 mcg/actuation nasal spray, 1 spray (50 mcg total) by Each Nostril route once daily., Disp: 16 g, Rfl: 0    hydroCHLOROthiazide (HYDRODIURIL) 12.5 MG Tab, Take 1 tablet (12.5 mg total) by mouth daily as needed (for fluid retention in legs)., Disp: 30 tablet, Rfl: 11    hydrOXYzine HCL (ATARAX) 25 MG tablet, Take 1 tablet (25 mg total) by mouth 3 (three) times daily as needed., Disp: 30 tablet, Rfl: 0    ibuprofen (ADVIL,MOTRIN) 600 MG tablet, Take 1 tablet (600 mg total) by mouth every 6 (six) hours as needed for Pain. (Patient not taking: Reported on 2/9/2022), Disp: 20 tablet, Rfl: 0    propranoloL (INDERAL) 10 MG tablet, Take 1 tablet (10 mg total) by mouth 2 (two) times daily., Disp: 60 tablet, Rfl: 11    sertraline (ZOLOFT) 50 MG tablet, Take 1.5 tablets (75 mg total) by mouth once daily., Disp: 45 tablet, Rfl: 11    spironolactone (ALDACTONE) 50 MG tablet, Start with 1 tablet by mouth once a day, then increase to 2 tablets by mouth once a day as tolerated (Patient taking differently: Start with 1 tablet by mouth once a day, then increase to 2 tablets by mouth once a day as tolerated), Disp: 60 tablet, Rfl: 3    triamcinolone acetonide 0.1% (KENALOG) 0.1 % cream, , Disp: , Rfl:     trifarotene (AKLIEF) 0.005 % Crea, Apply 1 Pump topically every evening. To entire face, Disp: 45 g, Rfl: 5    Review of patient's allergies indicates:   Allergen Reactions    Apple        OBJECTIVE:     Vital Signs:  Vitals:    03/16/22 1509   BP: 118/83   Temp: 97.7 °F (36.5 °C)   Weight: 87.6 kg (193 lb 2 oz)   Height: 5' 6" (1.676 m)       Body mass index is 31.17 kg/m².     Physical Exam  Constitutional:  "      General: She is not in acute distress.     Appearance: She is not ill-appearing.   HENT:      Head: Normocephalic.      Right Ear: Tympanic membrane and ear canal normal.      Left Ear: Tympanic membrane and ear canal normal.      Nose: Rhinorrhea (clear\) present.      Mouth/Throat:      Mouth: Mucous membranes are moist.      Pharynx: No posterior oropharyngeal erythema.   Eyes:      Extraocular Movements: Extraocular movements intact.      Pupils: Pupils are equal, round, and reactive to light.   Cardiovascular:      Rate and Rhythm: Normal rate and regular rhythm.      Pulses: Normal pulses.   Pulmonary:      Effort: Pulmonary effort is normal.      Breath sounds: Normal breath sounds.   Lymphadenopathy:      Cervical: No cervical adenopathy.   Skin:     Capillary Refill: Capillary refill takes less than 2 seconds.         Laboratory  No results found for this or any previous visit (from the past 336 hour(s)).     Health Maintenance Due   Topic Date Due    Hepatitis C Screening  Never done    HIV Screening  Never done       ASSESSMENT & PLAN:       Upper respiratory tract infection, unspecified type  1 day hx of rhinorrhea, watery nose, facial pressure. No fever, productive sputum, and lungs CTAB so overall presentation is less concerning for bacterial etiology. Suspect viral URI that can take up to 10-14 days to resolve. She has been vaccinated against covid x3.  - continue conservative management: anti-histamines qd, flonase, mucinex DM, acetaminophen/ibuprofen, saline nasal spray  - discussed alarm symptoms: fever, change in sputum production, dyspnea that may require re-evaluation, persistent sx >10 days  - fluticasone propionate (FLONASE) 50 mcg/actuation nasal spray; 1 spray (50 mcg total) by Each Nostril route once daily.  Dispense: 9.9 mL; Refill: 0  - dextromethorphan-guaiFENesin  mg (MUCINEX DM)  mg per 12 hr tablet; Take 1 tablet by mouth 2 (two) times daily. for 5 days  Dispense:  15 tablet; Refill: 0            RTC PRN    Discussed with Dr. Mata Dhillon,   Internal Medicine PGY-2

## 2022-03-16 NOTE — TELEPHONE ENCOUNTER
Spoke to pt. who reports she is having congestion and headaches. Pt is an [employer] employee, advised pt to contact employee health and/or go to nearest [facility] as Dr. Sunshine did not have any availability today. Pt verbalized an understanding.

## 2022-03-16 NOTE — TELEPHONE ENCOUNTER
----- Message from Parul Harley sent at 3/16/2022 12:31 PM CDT -----  Regarding: call back  Contact: [phone]  Type:  Needs Medical Advice    Who Called: PT   Symptoms (please be specific): Sinus infection pressure   How long has patient had these symptoms:  yesterday   Pharmacy name and phone #:  [facility]   Phone:  [phone] Fax:  [phone]  Would the patient rather a call back or a response via [facility]? Call back   Best Call Back Number: [phone]  Additional Information:

## 2022-03-16 NOTE — PROGRESS NOTES
Chief Complaint   Patient presents with    Sinus Problem        HPI:  Seen earlier today for URI - reporting 3 day history of severe rhinorrhea, R eye tearing, and nasal congestion and sinus pain.    ROS: sneezing, nasal congestion, sinus pressure      General:  AOx3, well nourished and developed in no acute distress  Eyes:  PERRLA, EOMI, vision intact grossly  Neck:  trachea midline with no masses or thyromegaly  Musculoskeletal:  Normal posture.  Normal muscular development.  Neurological:  CN II-XII grossly intact based off of video interaction  Psych:  Normal mood and affect.  Able to demonstrate good judgement and personal insight.      Assessment/Plan:    Respiratory infection       Discussed with patient likely etiology of symptoms.  Most likely viral, so will treat with short course of steroids today.  Fill antibiotics if not better in 2-3 days    The patient location is: work  The chief complaint leading to consultation is: sinus issues    Visit type: audiovisual    Face to Face time with patient: 15  15 minutes of total time spent on the encounter, which includes face to face time and non-face to face time preparing to see the patient (eg, review of tests), Obtaining and/or reviewing separately obtained history, Documenting clinical information in the electronic or other health record, Independently interpreting results (not separately reported) and communicating results to the patient/family/caregiver, or Care coordination (not separately reported).         Each patient to whom he or she provides medical services by telemedicine is:  (1) informed of the relationship between the physician and patient and the respective role of any other health care provider with respect to management of the patient; and (2) notified that he or she may decline to receive medical services by telemedicine and may withdraw from such care at any time.    Notes:       Answers for HPI/ROS submitted by the patient on  3/16/2022  activity change: No  unexpected weight change: No  neck pain: No  hearing loss: No  rhinorrhea: Yes  trouble swallowing: No  eye discharge: Yes  visual disturbance: No  chest tightness: No  wheezing: No  chest pain: No  palpitations: No  blood in stool: No  constipation: No  vomiting: No  diarrhea: No  polydipsia: No  polyuria: No  difficulty urinating: No  hematuria: No  menstrual problem: No  dysuria: No  joint swelling: No  arthralgias: No  headaches: No  weakness: No  confusion: No  dysphoric mood: No

## 2022-03-16 NOTE — LETTER
March 16, 2022    Doroteo Sun  [address]  [place] [address]         [facility]  [address]  [place] [address]  Phone: [phone]  Fax: [phone] March 16, 2022     Patient: Doroteo Sun   YOB: 1985   Date of Visit: 3/16/2022       To Whom It May Concern:    It is my medical opinion that Doroteo Sun should remain out of work until 3/18/22. I evaluated her in clinic on 3/16/22.     If you have any questions or concerns, please don't hesitate to call.    Sincerely,        Kate Dhillon, DO

## 2022-05-04 RX ORDER — CELECOXIB 200 MG/1
200 CAPSULE ORAL 2 TIMES DAILY
Qty: 60 CAPSULE | Refills: 0 | Status: SHIPPED | OUTPATIENT
Start: 2022-05-04 | End: 2022-07-02 | Stop reason: SDUPTHER

## 2022-05-05 ENCOUNTER — PATIENT MESSAGE (OUTPATIENT)
Dept: FAMILY MEDICINE | Facility: CLINIC | Age: 37
End: 2022-05-05
Payer: COMMERCIAL

## 2022-05-05 RX ORDER — GABAPENTIN 100 MG/1
100 CAPSULE ORAL 2 TIMES DAILY PRN
Qty: 30 CAPSULE | Refills: 0 | Status: SHIPPED | OUTPATIENT
Start: 2022-05-05 | End: 2022-06-30 | Stop reason: SDUPTHER

## 2022-05-05 RX ORDER — IBUPROFEN 600 MG/1
600 TABLET ORAL EVERY 6 HOURS PRN
Qty: 20 TABLET | Refills: 0 | Status: SHIPPED | OUTPATIENT
Start: 2022-05-05 | End: 2023-08-01

## 2022-05-05 NOTE — TELEPHONE ENCOUNTER
Patient sent a request for ibuprofen, also a portal message requesting gabapentin due to bursitis flare. Portal message also forwarded.

## 2022-05-30 ENCOUNTER — PATIENT MESSAGE (OUTPATIENT)
Dept: OBSTETRICS AND GYNECOLOGY | Facility: CLINIC | Age: 37
End: 2022-05-30

## 2022-05-31 ENCOUNTER — PATIENT MESSAGE (OUTPATIENT)
Dept: OBSTETRICS AND GYNECOLOGY | Facility: CLINIC | Age: 37
End: 2022-05-31

## 2022-06-16 ENCOUNTER — PATIENT MESSAGE (OUTPATIENT)
Dept: OBSTETRICS AND GYNECOLOGY | Facility: CLINIC | Age: 37
End: 2022-06-16

## 2022-06-20 ENCOUNTER — OFFICE VISIT (OUTPATIENT)
Dept: INTERNAL MEDICINE | Facility: CLINIC | Age: 37
End: 2022-06-20
Payer: COMMERCIAL

## 2022-06-20 VITALS
SYSTOLIC BLOOD PRESSURE: 118 MMHG | BODY MASS INDEX: 27.76 KG/M2 | OXYGEN SATURATION: 99 % | WEIGHT: 172.75 LBS | HEART RATE: 106 BPM | DIASTOLIC BLOOD PRESSURE: 80 MMHG | HEIGHT: 66 IN

## 2022-06-20 DIAGNOSIS — L73.9 FOLLICULITIS: Primary | ICD-10-CM

## 2022-06-20 DIAGNOSIS — F41.9 ANXIETY: ICD-10-CM

## 2022-06-20 DIAGNOSIS — L05.91 PILONIDAL CYST: ICD-10-CM

## 2022-06-20 DIAGNOSIS — R03.0 ELEVATED BP WITHOUT DIAGNOSIS OF HYPERTENSION: ICD-10-CM

## 2022-06-20 PROCEDURE — 99999 PR PBB SHADOW E&M-EST. PATIENT-LVL V: CPT | Mod: PBBFAC,,, | Performed by: PHYSICIAN ASSISTANT

## 2022-06-20 PROCEDURE — 3008F PR BODY MASS INDEX (BMI) DOCUMENTED: ICD-10-PCS | Mod: CPTII,S$GLB,, | Performed by: PHYSICIAN ASSISTANT

## 2022-06-20 PROCEDURE — 1159F PR MEDICATION LIST DOCUMENTED IN MEDICAL RECORD: ICD-10-PCS | Mod: CPTII,S$GLB,, | Performed by: PHYSICIAN ASSISTANT

## 2022-06-20 PROCEDURE — 99214 OFFICE O/P EST MOD 30 MIN: CPT | Mod: S$GLB,,, | Performed by: PHYSICIAN ASSISTANT

## 2022-06-20 PROCEDURE — 3079F PR MOST RECENT DIASTOLIC BLOOD PRESSURE 80-89 MM HG: ICD-10-PCS | Mod: CPTII,S$GLB,, | Performed by: PHYSICIAN ASSISTANT

## 2022-06-20 PROCEDURE — 99999 PR PBB SHADOW E&M-EST. PATIENT-LVL V: ICD-10-PCS | Mod: PBBFAC,,, | Performed by: PHYSICIAN ASSISTANT

## 2022-06-20 PROCEDURE — 1160F PR REVIEW ALL MEDS BY PRESCRIBER/CLIN PHARMACIST DOCUMENTED: ICD-10-PCS | Mod: CPTII,S$GLB,, | Performed by: PHYSICIAN ASSISTANT

## 2022-06-20 PROCEDURE — 1160F RVW MEDS BY RX/DR IN RCRD: CPT | Mod: CPTII,S$GLB,, | Performed by: PHYSICIAN ASSISTANT

## 2022-06-20 PROCEDURE — 1159F MED LIST DOCD IN RCRD: CPT | Mod: CPTII,S$GLB,, | Performed by: PHYSICIAN ASSISTANT

## 2022-06-20 PROCEDURE — 3074F SYST BP LT 130 MM HG: CPT | Mod: CPTII,S$GLB,, | Performed by: PHYSICIAN ASSISTANT

## 2022-06-20 PROCEDURE — 3074F PR MOST RECENT SYSTOLIC BLOOD PRESSURE < 130 MM HG: ICD-10-PCS | Mod: CPTII,S$GLB,, | Performed by: PHYSICIAN ASSISTANT

## 2022-06-20 PROCEDURE — 3079F DIAST BP 80-89 MM HG: CPT | Mod: CPTII,S$GLB,, | Performed by: PHYSICIAN ASSISTANT

## 2022-06-20 PROCEDURE — 3008F BODY MASS INDEX DOCD: CPT | Mod: CPTII,S$GLB,, | Performed by: PHYSICIAN ASSISTANT

## 2022-06-20 PROCEDURE — 99214 PR OFFICE/OUTPT VISIT, EST, LEVL IV, 30-39 MIN: ICD-10-PCS | Mod: S$GLB,,, | Performed by: PHYSICIAN ASSISTANT

## 2022-06-20 RX ORDER — DOXYCYCLINE 100 MG/1
100 CAPSULE ORAL EVERY 12 HOURS
Qty: 20 CAPSULE | Refills: 0 | Status: SHIPPED | OUTPATIENT
Start: 2022-06-20 | End: 2022-07-01

## 2022-06-20 RX ORDER — MUPIROCIN 20 MG/G
OINTMENT TOPICAL 2 TIMES DAILY
Qty: 22 G | Refills: 0 | Status: SHIPPED | OUTPATIENT
Start: 2022-06-20

## 2022-06-20 NOTE — PROGRESS NOTES
"Subjective:       Patient ID: Doroteo Sun is a 36 y.o. female.        Chief Complaint: Recurrent Skin Infections    Doroteo Sun is an established patient of Babatunde Sunshine MD here today for urgent care visit.    She notes a "hair bump" in vaginal area, recurrent issue.  She stopped shaving and now is waxing only to try to prevent folliculitis.  She applied tea tree oil per the advice of her mother, which seems to have helped.      Then she developed a lump at the apex of buttocks, painful, enlarging.  She has never had this before.         Review of Systems   Constitutional: Negative for chills, diaphoresis, fatigue and fever.   HENT: Negative for congestion and sore throat.    Eyes: Negative for visual disturbance.   Respiratory: Negative for cough, chest tightness and shortness of breath.    Cardiovascular: Negative for chest pain, palpitations and leg swelling.   Gastrointestinal: Negative for abdominal pain, blood in stool, constipation, diarrhea, nausea and vomiting.   Genitourinary: Negative for dysuria, frequency, hematuria and urgency.   Musculoskeletal: Negative for arthralgias and back pain.   Skin: Negative for rash.   Neurological: Negative for dizziness, syncope, weakness and headaches.   Psychiatric/Behavioral: Negative for dysphoric mood and sleep disturbance. The patient is not nervous/anxious.        Objective:      Physical Exam  Vitals and nursing note reviewed.   Constitutional:       Appearance: Normal appearance. She is well-developed.   HENT:      Head: Normocephalic.      Right Ear: External ear normal.      Left Ear: External ear normal.   Eyes:      Pupils: Pupils are equal, round, and reactive to light.   Cardiovascular:      Rate and Rhythm: Normal rate and regular rhythm.      Heart sounds: Normal heart sounds. No murmur heard.    No friction rub. No gallop.   Pulmonary:      Effort: Pulmonary effort is normal. No respiratory distress.      Breath sounds: Normal breath sounds. " "  Abdominal:      Palpations: Abdomen is soft.      Tenderness: There is no abdominal tenderness.   Genitourinary:      Skin:     General: Skin is warm and dry.          Neurological:      Mental Status: She is alert.         Assessment:       1. Folliculitis    2. Pilonidal cyst    3. Anxiety    4. Elevated BP without diagnosis of hypertension        Plan:       Doroteo was seen today for recurrent skin infections.    Diagnoses and all orders for this visit:    Folliculitis  -     doxycycline (VIBRAMYCIN) 100 MG Cap; Take 1 capsule (100 mg total) by mouth every 12 (twelve) hours.  -     mupirocin (BACTROBAN) 2 % ointment; Apply topically 2 (two) times daily.    Pilonidal cyst  -     doxycycline (VIBRAMYCIN) 100 MG Cap; Take 1 capsule (100 mg total) by mouth every 12 (twelve) hours.  -     Ambulatory referral/consult to Colorectal Surgery; Future    Anxiety - stable    Elevated BP without diagnosis of hypertension - BP is fine today  BP Readings from Last 5 Encounters:   06/20/22 118/80   03/16/22 118/83   12/02/21 (!) 130/90   10/08/21 116/86   06/24/21 118/84      Refer to CRS    Pt has been given instructions populated from [employer] database and has verbalized understanding of the after visit summary and information contained wherein.    Follow up with a primary care provider. May go to ER for acute shortness of breath, lightheadedness, fever, or any other emergent complaints or changes in condition.    "This note will be shared with the patient"    Future Appointments   Date Time Provider Department Center   6/28/2022  8:00 AM Nneka Tom NP [facility]   9/20/2022  2:15 PM Diana Montemayor MD [facility]                 "

## 2022-06-30 RX ORDER — GABAPENTIN 100 MG/1
100 CAPSULE ORAL 2 TIMES DAILY PRN
Qty: 30 CAPSULE | Refills: 11 | Status: SHIPPED | OUTPATIENT
Start: 2022-06-30

## 2022-06-30 NOTE — TELEPHONE ENCOUNTER
Care Due:                  Date            Visit Type   Department     Provider  --------------------------------------------------------------------------------                                MYCHART                              FOLLOWUP/OF  [facility] FAMILY    Jose J Wilson  Last Visit: 06-      FICE VISIT   MEDICINE       Mounika  Next Visit: None Scheduled  None         None Found                                                            Last  Test          Frequency    Reason                     Performed    Due Date  --------------------------------------------------------------------------------    Office Visit  12 months..  hydroCHLOROthiazide,       06- 06-                             propranoloL, sertraline..    CMP.........  12 months..  hydroCHLOROthiazide......  Not Found    Overdue    [employer] Embedded Care Gaps. Reference number: [id number]. 6/30/2022   9:25:40 AM CDT

## 2022-07-01 ENCOUNTER — PATIENT MESSAGE (OUTPATIENT)
Dept: GASTROENTEROLOGY | Facility: CLINIC | Age: 37
End: 2022-07-01

## 2022-07-01 ENCOUNTER — OFFICE VISIT (OUTPATIENT)
Dept: INTERNAL MEDICINE | Facility: CLINIC | Age: 37
End: 2022-07-01
Payer: COMMERCIAL

## 2022-07-01 VITALS
TEMPERATURE: 98 F | BODY MASS INDEX: 27.81 KG/M2 | DIASTOLIC BLOOD PRESSURE: 80 MMHG | WEIGHT: 173.06 LBS | HEIGHT: 66 IN | SYSTOLIC BLOOD PRESSURE: 110 MMHG

## 2022-07-01 DIAGNOSIS — J02.8 PHARYNGITIS DUE TO OTHER ORGANISM: Primary | ICD-10-CM

## 2022-07-01 LAB — GROUP A STREP, MOLECULAR: NEGATIVE

## 2022-07-01 PROCEDURE — 1159F PR MEDICATION LIST DOCUMENTED IN MEDICAL RECORD: ICD-10-PCS | Mod: CPTII,S$GLB,, | Performed by: INTERNAL MEDICINE

## 2022-07-01 PROCEDURE — 1159F MED LIST DOCD IN RCRD: CPT | Mod: CPTII,S$GLB,, | Performed by: INTERNAL MEDICINE

## 2022-07-01 PROCEDURE — 3079F DIAST BP 80-89 MM HG: CPT | Mod: CPTII,S$GLB,, | Performed by: INTERNAL MEDICINE

## 2022-07-01 PROCEDURE — 3074F PR MOST RECENT SYSTOLIC BLOOD PRESSURE < 130 MM HG: ICD-10-PCS | Mod: CPTII,S$GLB,, | Performed by: INTERNAL MEDICINE

## 2022-07-01 PROCEDURE — 87651 STREP A DNA AMP PROBE: CPT | Performed by: INTERNAL MEDICINE

## 2022-07-01 PROCEDURE — 99214 OFFICE O/P EST MOD 30 MIN: CPT | Mod: S$GLB,,, | Performed by: INTERNAL MEDICINE

## 2022-07-01 PROCEDURE — 3008F PR BODY MASS INDEX (BMI) DOCUMENTED: ICD-10-PCS | Mod: CPTII,S$GLB,, | Performed by: INTERNAL MEDICINE

## 2022-07-01 PROCEDURE — 99214 PR OFFICE/OUTPT VISIT, EST, LEVL IV, 30-39 MIN: ICD-10-PCS | Mod: S$GLB,,, | Performed by: INTERNAL MEDICINE

## 2022-07-01 PROCEDURE — 3074F SYST BP LT 130 MM HG: CPT | Mod: CPTII,S$GLB,, | Performed by: INTERNAL MEDICINE

## 2022-07-01 PROCEDURE — 99999 PR PBB SHADOW E&M-EST. PATIENT-LVL IV: CPT | Mod: PBBFAC,,, | Performed by: INTERNAL MEDICINE

## 2022-07-01 PROCEDURE — 99999 PR PBB SHADOW E&M-EST. PATIENT-LVL IV: ICD-10-PCS | Mod: PBBFAC,,, | Performed by: INTERNAL MEDICINE

## 2022-07-01 PROCEDURE — 3079F PR MOST RECENT DIASTOLIC BLOOD PRESSURE 80-89 MM HG: ICD-10-PCS | Mod: CPTII,S$GLB,, | Performed by: INTERNAL MEDICINE

## 2022-07-01 PROCEDURE — 3008F BODY MASS INDEX DOCD: CPT | Mod: CPTII,S$GLB,, | Performed by: INTERNAL MEDICINE

## 2022-07-01 RX ORDER — DEXAMETHASONE 6 MG/1
6 TABLET ORAL EVERY 12 HOURS
Qty: 6 TABLET | Refills: 0 | Status: SHIPPED | OUTPATIENT
Start: 2022-07-01 | End: 2022-07-04

## 2022-07-01 NOTE — PROGRESS NOTES
INTERNAL MEDICINE CLINIC - SAME DAY APPOINTMENT  Progress Note    PRESENTING HISTORY     PCP: Babatunde Sunshine MD    Chief Complaint/Reason for Visit:   Sore throat    History of Present Illness & ROS : Ms. Doroteo Sun is a 36 y.o. female.     Son has viral pharyngitis for one week (negative COVID and strep)'  She started having sore throat since yesterday morning.  6/10. Moderate pain on swallowing .  No fever.  Mild cough. No sinus congestion. No SOB. No chest.  Took Aleve and Cold/Flu. Crooks cough droops.       PAST HISTORY:     Past Medical History:   Diagnosis Date    Acne     Anxiety     Chronic constipation     Herpes labialis 12/7/2016    Hypertension        No past surgical history on file.    Family History   Problem Relation Age of Onset    Hypertension Maternal Grandmother     Cancer Neg Hx     Diabetes Neg Hx     Breast cancer Neg Hx     Colon cancer Neg Hx     Ovarian cancer Neg Hx     Cervical cancer Neg Hx     Endometrial cancer Neg Hx     Vaginal cancer Neg Hx     Melanoma Neg Hx        Social History     Socioeconomic History    Marital status: Single   Occupational History     Comment: [employer]   Tobacco Use    Smoking status: Never Smoker    Smokeless tobacco: Never Used   Substance and Sexual Activity    Alcohol use: Yes     Alcohol/week: 0.0 standard drinks     Comment: Socially.    Drug use: No    Sexual activity: Yes     Partners: Male     Birth control/protection: Condom       MEDICATIONS & ALLERGIES:     Current Outpatient Medications on File Prior to Visit   Medication Sig Dispense Refill    celecoxib (CELEBREX) 200 MG capsule Take 1 capsule (200 mg total) by mouth 2 (two) times daily. 60 capsule 0    clotrimazole-betamethasone 1-0.05% (LOTRISONE) cream Apply topically 2 (two) times daily. 45 g 0    fluticasone propionate (FLONASE) 50 mcg/actuation nasal spray 1 spray (50 mcg total) by Each Nostril route once daily. 16 g 0    gabapentin (NEURONTIN)  100 MG capsule Take 1 capsule (100 mg total) by mouth 2 (two) times daily as needed. 30 capsule 11    hydroCHLOROthiazide (HYDRODIURIL) 12.5 MG Tab Take 1 tablet (12.5 mg total) by mouth daily as needed (for fluid retention in legs). 30 tablet 11    hydrOXYzine HCL (ATARAX) 25 MG tablet Take 1 tablet (25 mg total) by mouth 3 (three) times daily as needed. 30 tablet 0    ibuprofen (ADVIL,MOTRIN) 600 MG tablet Take 1 tablet (600 mg total) by mouth every 6 (six) hours as needed for Pain. 20 tablet 0    mupirocin (BACTROBAN) 2 % ointment Apply topically 2 (two) times daily. 22 g 0    propranoloL (INDERAL) 10 MG tablet Take 1 tablet (10 mg total) by mouth 2 (two) times daily. 60 tablet 11    sertraline (ZOLOFT) 50 MG tablet Take 1.5 tablets (75 mg total) by mouth once daily. 45 tablet 11    spironolactone (ALDACTONE) 50 MG tablet Start with 1 tablet by mouth once a day, then increase to 2 tablets by mouth once a day as tolerated 60 tablet 3    triamcinolone acetonide 0.1% (KENALOG) 0.1 % cream       trifarotene (AKLIEF) 0.005 % Crea Apply 1 Pump topically every evening. To entire face 45 g 5       Review of patient's allergies indicates:   Allergen Reactions    Apple        Medications Reconciliation:   I have reconciled the patient's home medications with the patient/family. I have updated all changes.  Refer to After-Visit Medication List.    OBJECTIVE:     Vital Signs:  Vitals:    07/01/22 0805   BP: 110/80   Temp: 98.1 °F (36.7 °C)     Wt Readings from Last 3 Encounters:   07/01/22 0805 78.5 kg (173 lb 1 oz)   06/20/22 0839 78.4 kg (172 lb 11.7 oz)   03/16/22 1509 87.6 kg (193 lb 2 oz)     Body mass index is 27.93 kg/m².     Physical Exam:  General: Well developed, well nourished. No distress.  HEENT: Head is normocephalic, atraumatic; ears are normal.  TM clear.  Mild erythema on pharynx and tonsil areas. No exudate.  Eyes: Clear conjunctiva.  Neck: Supple, symmetrical neck; trachea midline.  Lungs: Clear  to auscultation bilaterally and normal respiratory effort.  Cardiovascular: Heart with regular rate and rhythm.    Extremities: No LE edema.  Abdomen: Abdomen is soft  Skin: Skin color, texture, turgor normal. No rashes.  Musculoskeletal: Normal gait.   Lymph Nodes: No cervical or supraclavicular adenopathy.  Psychiatric: Normal affect. Alert.    Laboratory  Lab Results   Component Value Date    WBC 4.82 07/22/2019    HGB 11.5 (L) 07/22/2019    HCT 38.3 07/22/2019     07/22/2019    CHOL 156 07/22/2019    TRIG 62 07/22/2019    HDL 52 07/22/2019    ALT 17 07/22/2019    AST 17 07/22/2019     05/27/2019    K 4.1 05/27/2019     05/27/2019    CREATININE 0.8 05/27/2019    BUN 19 05/27/2019    CO2 28 05/27/2019    TSH 0.997 04/29/2019    INR 1.0 11/16/2016    HGBA1C 4.9 04/13/2018       ASSESSMENT & PLAN:     Pharyngitis due to other organism  -     Group A Strep, Molecular    -     dexAMETHasone (DECADRON) 6 MG tablet; Take 1 tablet (6 mg total) by mouth every 12 (twelve) hours. for 3 days  Dispense: 6 tablet; Refill: 0  -     diphenhydrAMINE 12.5 mg/5 mL Liqd 50 mg, aluminum-magnesium hydroxide-simethicone 200-200-20 mg/5 mL Susp 20 mL, LIDOcaine HCl 2% 2 % Soln 20 mL; Swish and spit 15 mLs 4 (four) times daily. for 7 days  Dispense: 500 mL; Refill: 0      Scheduled Follow-up :  Future Appointments   Date Time Provider Department Center   7/15/2022  1:00 PM Lynsey Correia MD [facility]   9/20/2022  2:15 PM Diana Montemayor MD [facility]       After Visit Medication List :     Medication List          Accurate as of July 1, 2022  8:25 AM. If you have any questions, ask your nurse or doctor.            START taking these medications    dexAMETHasone 6 MG tablet  Commonly known as: DECADRON  Take 1 tablet (6 mg total) by mouth every 12 (twelve) hours. for 3 days  Started by: Jean Louise MD     diphenhydrAMINE 12.5 mg/5 mL Liqd 50 mg, aluminum-magnesium hydroxide-simethicone  200-200-20 mg/5 mL Susp 20 mL, LIDOcaine HCl 2% 2 % Soln 20 mL  Swish and spit 15 mLs 4 (four) times daily. for 7 days  Started by: Jean Louise MD        CONTINUE taking these medications    AKLIEF 0.005 % Crea  Generic drug: trifarotene  Apply 1 Pump topically every evening. To entire face     celecoxib 200 MG capsule  Commonly known as: CeleBREX  Take 1 capsule (200 mg total) by mouth 2 (two) times daily.     clotrimazole-betamethasone 1-0.05% cream  Commonly known as: LOTRISONE  Apply topically 2 (two) times daily.     fluticasone propionate 50 mcg/actuation nasal spray  Commonly known as: FLONASE  1 spray (50 mcg total) by Each Nostril route once daily.     gabapentin 100 MG capsule  Commonly known as: NEURONTIN  Take 1 capsule (100 mg total) by mouth 2 (two) times daily as needed.     hydroCHLOROthiazide 12.5 MG Tab  Commonly known as: HYDRODIURIL  Take 1 tablet (12.5 mg total) by mouth daily as needed (for fluid retention in legs).     hydrOXYzine HCL 25 MG tablet  Commonly known as: ATARAX  Take 1 tablet (25 mg total) by mouth 3 (three) times daily as needed.     ibuprofen 600 MG tablet  Commonly known as: ADVIL,MOTRIN  Take 1 tablet (600 mg total) by mouth every 6 (six) hours as needed for Pain.     mupirocin 2 % ointment  Commonly known as: BACTROBAN  Apply topically 2 (two) times daily.     propranoloL 10 MG tablet  Commonly known as: INDERAL  Take 1 tablet (10 mg total) by mouth 2 (two) times daily.     sertraline 50 MG tablet  Commonly known as: ZOLOFT  Take 1.5 tablets (75 mg total) by mouth once daily.     spironolactone 50 MG tablet  Commonly known as: ALDACTONE  Start with 1 tablet by mouth once a day, then increase to 2 tablets by mouth once a day as tolerated     triamcinolone acetonide 0.1% 0.1 % cream  Commonly known as: KENALOG        STOP taking these medications    doxycycline 100 MG Cap  Commonly known as: VIBRAMYCIN  Stopped by: Jean Louise MD           Where to Get Your Medications       These medications were sent to [facility]  [address] [facility] [address]    Hours: Mon-Fri 7a-7p, Sat-Sun 10a-4p Phone: [phone]   · dexAMETHasone 6 MG tablet     You can get these medications from any pharmacy    Bring a paper prescription for each of these medications  · diphenhydrAMINE 12.5 mg/5 mL Liqd 50 mg, aluminum-magnesium hydroxide-simethicone 200-200-20 mg/5 mL Susp 20 mL, LIDOcaine HCl 2% 2 % Soln 20 mL         Signing Physician:  Jean Louise MD

## 2022-07-05 RX ORDER — CELECOXIB 200 MG/1
200 CAPSULE ORAL 2 TIMES DAILY
Qty: 60 CAPSULE | Refills: 0 | Status: SHIPPED | OUTPATIENT
Start: 2022-07-05 | End: 2023-07-08 | Stop reason: SDUPTHER

## 2022-07-11 ENCOUNTER — PATIENT MESSAGE (OUTPATIENT)
Dept: FAMILY MEDICINE | Facility: CLINIC | Age: 37
End: 2022-07-11

## 2022-07-12 RX ORDER — BENZONATATE 200 MG/1
200 CAPSULE ORAL 3 TIMES DAILY PRN
Qty: 30 CAPSULE | Refills: 1 | Status: SHIPPED | OUTPATIENT
Start: 2022-07-12 | End: 2022-07-22

## 2022-07-20 ENCOUNTER — PATIENT MESSAGE (OUTPATIENT)
Dept: FAMILY MEDICINE | Facility: CLINIC | Age: 37
End: 2022-07-20

## 2022-07-21 RX ORDER — CLINDAMYCIN PHOSPHATE 11.9 MG/ML
SOLUTION TOPICAL 2 TIMES DAILY
Qty: 30 ML | Refills: 0 | Status: SHIPPED | OUTPATIENT
Start: 2022-07-21

## 2022-07-21 NOTE — TELEPHONE ENCOUNTER
I can send over clindamycin solution that you can apply twice a day to the area along with using warm compresses to help facilitate drainage if possible.  If symptoms are not improving, we probably need to see you in the office to further evaluate.          Medications Ordered This Encounter   Medications    clindamycin (CLEOCIN T) 1 % external solution       Sig: Apply topically 2 (two) times daily.       Dispense:  30 mL       Refill:  0         [facility]  [address] [facility] [address]  Phone: [phone] Fax: [phone]      This Patient Portal message has not been read.     Doroteo Sun 10 hours ago (10:41 PM)            Pic 2   Attachments     [id number]         Doroteo Sun 10 hours ago (10:40 PM)            Hi    I have a another inflamed follicle that I was seen for in June. I now have another painful one can something be called in for this Im currently using  Mupirocin  Is there any this else that can make pop or go away.

## 2022-07-26 DIAGNOSIS — J02.9 SORE THROAT: Primary | ICD-10-CM

## 2022-07-26 LAB
CTP QC/QA: YES
SARS-COV-2 AG RESP QL IA.RAPID: POSITIVE

## 2022-09-19 ENCOUNTER — PATIENT MESSAGE (OUTPATIENT)
Dept: OBSTETRICS AND GYNECOLOGY | Facility: CLINIC | Age: 37
End: 2022-09-19

## 2022-09-27 ENCOUNTER — OFFICE VISIT (OUTPATIENT)
Dept: PSYCHIATRY | Facility: CLINIC | Age: 37
End: 2022-09-27
Payer: COMMERCIAL

## 2022-09-27 DIAGNOSIS — F41.1 GAD (GENERALIZED ANXIETY DISORDER): Primary | ICD-10-CM

## 2022-09-27 PROCEDURE — 90791 PSYCH DIAGNOSTIC EVALUATION: CPT | Mod: S$GLB,,, | Performed by: PSYCHOLOGIST

## 2022-09-27 PROCEDURE — 90791 PR PSYCHIATRIC DIAGNOSTIC EVALUATION: ICD-10-PCS | Mod: S$GLB,,, | Performed by: PSYCHOLOGIST

## 2022-09-27 NOTE — PROGRESS NOTES
"Psychiatry Initial Visit (PhD/LCSW)  Diagnostic Interview - CPT 08088    Date: 9/27/2022    Site: [facility]    Referral source: self    Clinical status of patient: Outpatient    Doroteo Sun, a 37 y.o. female, for initial evaluation visit.  Met with patient.    Chief complaint/reason for encounter: anxiety    History of present illness: Patient reported symptoms of anxiety since age 27, but noted that symptoms have increased over the last six years.  Symptoms include excessive anxiety/worry, restlessness/keyed up, irritability, muscle tension, rapid thoughts, and insomnia.  Patient reported stressors including opening a new , her son's wellbeing, her 's wellbeing after the loss of both of his parents, and her mother needing surgery.  She discussed feeling that although she wants to open her own business, she is afraid of failure as it would result in financial loss.  Patient denied history of self-harm behaviors.  Patient denied current suicidal and homicidal ideations.     Pain: noncontributory    Symptoms:   Mood: insomnia  Anxiety: excessive anxiety/worry, restlessness/keyed up, irritability, and muscle tension  Substance abuse: denied  Cognitive functioning: denied  Health behaviors: noncontributory    Psychiatric history: psychotropic management by PCP    Medical history: none    Family history of psychiatric illness: none    Social history (marriage, employment, etc.): Patient reported growing up in [place], [place] living with her mother and maternal grandmother.  She noted that her parents  when she was [age] years old at which time he stopped coming to visit patient.  Patient reported that she did not have a relationship with her father prior to his death last year.  She has a close relationship with her mother in which she considers her mother to be "her best friend."  Patient is an only child.  She graduated from high school.  Her highest level of education is a CDB " "certification.  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  She has been  for two years and considers her  to be supportive.  She has one child (six year old son).    Substance use:   Alcohol: social - weekend outings up to two to three drinks per instance   Drugs: none   Tobacco: none   Caffeine: Sodas - 10 ounces daily    Current medications and drug reactions (include OTC, herbal): see medication list     Strengths and liabilities: Strength: Patient is expressive/articulate., Liability: Patient lacks coping skills.    Current Evaluation:     Mental Status Exam:  General Appearance:  unremarkable, age appropriate   Speech: normal tone, normal rate, normal pitch, normal volume      Level of Cooperation: cooperative      Thought Processes: normal and logical   Mood: anxious      Thought Content: normal, no suicidality, no homicidality, delusions, or paranoia   Affect: congruent and appropriate   Orientation: Oriented x3   Memory: recent >  words after brief delay: 3 of 3 words, remote >  intact   Attention Span & Concentration: unable to spell "WORLD" backwards, unable to complete serial 7's   Fund of General Knowledge: intact and appropriate to age and level of education   Judgment & Insight: fair     Language  intact     Diagnostic Impression - Plan:       ICD-10-CM ICD-9-CM   1. LOBITO (generalized anxiety disorder)  F41.1 300.02       Plan:individual psychotherapy and consult psychiatrist for medication evaluation    Return to Clinic: 1 week    Length of Service (minutes): 60    "

## 2022-10-04 ENCOUNTER — TELEPHONE (OUTPATIENT)
Dept: PSYCHIATRY | Facility: CLINIC | Age: 37
End: 2022-10-04

## 2022-10-14 ENCOUNTER — OFFICE VISIT (OUTPATIENT)
Dept: OPTOMETRY | Facility: CLINIC | Age: 37
End: 2022-10-14
Payer: COMMERCIAL

## 2022-10-14 DIAGNOSIS — Z97.3 WEARS CONTACT LENSES: ICD-10-CM

## 2022-10-14 DIAGNOSIS — H52.13 MYOPIA OF BOTH EYES: Primary | ICD-10-CM

## 2022-10-14 PROCEDURE — 99999 PR PBB SHADOW E&M-EST. PATIENT-LVL II: ICD-10-PCS | Mod: PBBFAC,,, | Performed by: OPTOMETRIST

## 2022-10-14 PROCEDURE — 92015 DETERMINE REFRACTIVE STATE: CPT | Mod: S$GLB,,, | Performed by: OPTOMETRIST

## 2022-10-14 PROCEDURE — 92014 COMPRE OPH EXAM EST PT 1/>: CPT | Mod: S$GLB,,, | Performed by: OPTOMETRIST

## 2022-10-14 PROCEDURE — 92310 CONTACT LENS FITTING OU: CPT | Mod: S$GLB,,, | Performed by: OPTOMETRIST

## 2022-10-14 PROCEDURE — 92014 PR EYE EXAM, EST PATIENT,COMPREHESV: ICD-10-PCS | Mod: S$GLB,,, | Performed by: OPTOMETRIST

## 2022-10-14 PROCEDURE — 92015 PR REFRACTION: ICD-10-PCS | Mod: S$GLB,,, | Performed by: OPTOMETRIST

## 2022-10-14 PROCEDURE — 92310 PR CONTACT LENS FITTING (NO CHANGE): ICD-10-PCS | Mod: S$GLB,,, | Performed by: OPTOMETRIST

## 2022-10-14 PROCEDURE — 99999 PR PBB SHADOW E&M-EST. PATIENT-LVL II: CPT | Mod: PBBFAC,,, | Performed by: OPTOMETRIST

## 2022-10-14 NOTE — PROGRESS NOTES
HPI    Annual eye exam    DLS- 07/08/20    37 y.o. is here for annual eye exam  Pt states feels her vision is the same as previous   Pt worn cl in the past, PTW DW  Pt want to try cl again  Pt want to get a new rx for gl and cl    (+)Flashes (+)Floaters Occasionally   (-)Itch, (-)tear, (-)burn, (-)Dryness.  (-) OTC Drops  (-)Photophobia(-)Glare   (-) Headaches (-) Eye Pain (-) Double vision    Eye Medications: None    No Past Ocular history     No Family Ocular history     Last edited by Jono Tyson, OD on 10/14/2022 10:42 AM.            Assessment /Plan     For exam results, see Encounter Report.    Myopia of both eyes  Wears contact lenses  Eyeglass Final Rx       Eyeglass Final Rx         Sphere Cylinder [place] [employer] [facility]    Right -1.50 +0.50 010 20/20    Left -1.50 Sphere  20/20      Type: SVL    Expiration Date: 10/14/2023                  Contact Lens Final Rx       Final Contact Lens Rx         Brand Base Curve Diameter Sphere Cylinder    Right Dailies Total 1 8.5 14.1 -1.25 Sphere    Left Dailies Total 1 8.5 14.1 -1.25 Sphere      Expiration Date: 10/14/2023    Wearing Schedule: Daily Wear                  PTW Dialies      RTC 1 yr

## 2022-10-17 ENCOUNTER — PATIENT MESSAGE (OUTPATIENT)
Dept: OPTOMETRY | Facility: CLINIC | Age: 37
End: 2022-10-17

## 2022-10-24 ENCOUNTER — PATIENT MESSAGE (OUTPATIENT)
Dept: GASTROENTEROLOGY | Facility: CLINIC | Age: 37
End: 2022-10-24

## 2022-10-24 DIAGNOSIS — K58.1 IRRITABLE BOWEL SYNDROME WITH CONSTIPATION: Primary | ICD-10-CM

## 2022-10-24 DIAGNOSIS — G44.219 EPISODIC TENSION-TYPE HEADACHE, NOT INTRACTABLE: ICD-10-CM

## 2022-10-24 DIAGNOSIS — R03.0 ELEVATED BP WITHOUT DIAGNOSIS OF HYPERTENSION: ICD-10-CM

## 2022-10-25 ENCOUNTER — PATIENT MESSAGE (OUTPATIENT)
Dept: GASTROENTEROLOGY | Facility: CLINIC | Age: 37
End: 2022-10-25

## 2022-10-25 ENCOUNTER — TELEPHONE (OUTPATIENT)
Dept: GASTROENTEROLOGY | Facility: CLINIC | Age: 37
End: 2022-10-25

## 2022-10-25 ENCOUNTER — PATIENT MESSAGE (OUTPATIENT)
Dept: FAMILY MEDICINE | Facility: CLINIC | Age: 37
End: 2022-10-25

## 2022-10-25 RX ORDER — LUBIPROSTONE 8 UG/1
8 CAPSULE ORAL 2 TIMES DAILY
Qty: 180 CAPSULE | Refills: 3 | Status: SHIPPED | OUTPATIENT
Start: 2022-10-25 | End: 2023-10-25

## 2022-10-25 RX ORDER — CELECOXIB 200 MG/1
200 CAPSULE ORAL 2 TIMES DAILY
Qty: 60 CAPSULE | Refills: 0 | OUTPATIENT
Start: 2022-10-25

## 2022-10-25 RX ORDER — PROPRANOLOL HYDROCHLORIDE 10 MG/1
10 TABLET ORAL 2 TIMES DAILY
Qty: 60 TABLET | Refills: 11 | OUTPATIENT
Start: 2022-10-25 | End: 2023-10-25

## 2022-10-25 NOTE — TELEPHONE ENCOUNTER
Care Due:                  Date            Visit Type   Department     Provider  --------------------------------------------------------------------------------                                MYCHART                              FOLLOWUP/OF  [facility] FAMILY    Jose J Wilson  Last Visit: 06-      FICE VISIT   MEDICINE       Mounika  Next Visit: None Scheduled  None         None Found                                                            Last  Test          Frequency    Reason                     Performed    Due Date  --------------------------------------------------------------------------------    Office Visit  12 months..  hydroCHLOROthiazide,       06- 06-                             propranoloL, sertraline..    CMP.........  12 months..  hydroCHLOROthiazide......  Not Found    Overdue    [employer] Embedded Care Gaps. Reference number: [id number]. 10/24/2022   8:33:15 PM CDT

## 2022-10-26 RX ORDER — HYDROXYZINE HYDROCHLORIDE 25 MG/1
25 TABLET, FILM COATED ORAL 3 TIMES DAILY PRN
Qty: 30 TABLET | Refills: 0 | OUTPATIENT
Start: 2022-10-26

## 2022-12-12 ENCOUNTER — PATIENT MESSAGE (OUTPATIENT)
Dept: FAMILY MEDICINE | Facility: CLINIC | Age: 37
End: 2022-12-12
Payer: MEDICAID

## 2023-01-06 ENCOUNTER — OFFICE VISIT (OUTPATIENT)
Dept: FAMILY MEDICINE | Facility: CLINIC | Age: 38
End: 2023-01-06
Payer: MEDICAID

## 2023-01-06 ENCOUNTER — PATIENT MESSAGE (OUTPATIENT)
Dept: FAMILY MEDICINE | Facility: CLINIC | Age: 38
End: 2023-01-06

## 2023-01-06 VITALS
HEIGHT: 66 IN | DIASTOLIC BLOOD PRESSURE: 86 MMHG | SYSTOLIC BLOOD PRESSURE: 130 MMHG | WEIGHT: 181.88 LBS | HEART RATE: 92 BPM | BODY MASS INDEX: 29.23 KG/M2 | OXYGEN SATURATION: 99 %

## 2023-01-06 DIAGNOSIS — Z11.4 SCREENING FOR HIV WITHOUT PRESENCE OF RISK FACTORS: ICD-10-CM

## 2023-01-06 DIAGNOSIS — G44.219 EPISODIC TENSION-TYPE HEADACHE, NOT INTRACTABLE: ICD-10-CM

## 2023-01-06 DIAGNOSIS — F41.9 ANXIETY: ICD-10-CM

## 2023-01-06 DIAGNOSIS — Z11.59 ENCOUNTER FOR HEPATITIS C SCREENING TEST FOR LOW RISK PATIENT: ICD-10-CM

## 2023-01-06 DIAGNOSIS — R03.0 ELEVATED BP WITHOUT DIAGNOSIS OF HYPERTENSION: ICD-10-CM

## 2023-01-06 DIAGNOSIS — Z00.00 ROUTINE GENERAL MEDICAL EXAMINATION AT A HEALTH CARE FACILITY: Primary | ICD-10-CM

## 2023-01-06 PROCEDURE — 3075F PR MOST RECENT SYSTOLIC BLOOD PRESS GE 130-139MM HG: ICD-10-PCS | Mod: CPTII,,, | Performed by: FAMILY MEDICINE

## 2023-01-06 PROCEDURE — 3008F PR BODY MASS INDEX (BMI) DOCUMENTED: ICD-10-PCS | Mod: CPTII,,, | Performed by: FAMILY MEDICINE

## 2023-01-06 PROCEDURE — 99395 PR PREVENTIVE VISIT,EST,18-39: ICD-10-PCS | Mod: S$PBB,,, | Performed by: FAMILY MEDICINE

## 2023-01-06 PROCEDURE — 3079F DIAST BP 80-89 MM HG: CPT | Mod: CPTII,,, | Performed by: FAMILY MEDICINE

## 2023-01-06 PROCEDURE — 1159F PR MEDICATION LIST DOCUMENTED IN MEDICAL RECORD: ICD-10-PCS | Mod: CPTII,,, | Performed by: FAMILY MEDICINE

## 2023-01-06 PROCEDURE — 99395 PREV VISIT EST AGE 18-39: CPT | Mod: S$PBB,,, | Performed by: FAMILY MEDICINE

## 2023-01-06 PROCEDURE — 1159F MED LIST DOCD IN RCRD: CPT | Mod: CPTII,,, | Performed by: FAMILY MEDICINE

## 2023-01-06 PROCEDURE — 3075F SYST BP GE 130 - 139MM HG: CPT | Mod: CPTII,,, | Performed by: FAMILY MEDICINE

## 2023-01-06 PROCEDURE — 99213 OFFICE O/P EST LOW 20 MIN: CPT | Mod: PBBFAC,PO | Performed by: FAMILY MEDICINE

## 2023-01-06 PROCEDURE — 3079F PR MOST RECENT DIASTOLIC BLOOD PRESSURE 80-89 MM HG: ICD-10-PCS | Mod: CPTII,,, | Performed by: FAMILY MEDICINE

## 2023-01-06 PROCEDURE — 99999 PR PBB SHADOW E&M-EST. PATIENT-LVL III: CPT | Mod: PBBFAC,,, | Performed by: FAMILY MEDICINE

## 2023-01-06 PROCEDURE — 99999 PR PBB SHADOW E&M-EST. PATIENT-LVL III: ICD-10-PCS | Mod: PBBFAC,,, | Performed by: FAMILY MEDICINE

## 2023-01-06 PROCEDURE — 3008F BODY MASS INDEX DOCD: CPT | Mod: CPTII,,, | Performed by: FAMILY MEDICINE

## 2023-01-06 RX ORDER — SERTRALINE HYDROCHLORIDE 100 MG/1
200 TABLET, FILM COATED ORAL DAILY
Qty: 60 TABLET | Refills: 11 | Status: SHIPPED | OUTPATIENT
Start: 2023-01-06 | End: 2024-03-06

## 2023-01-06 RX ORDER — HYDROXYZINE HYDROCHLORIDE 25 MG/1
25 TABLET, FILM COATED ORAL 3 TIMES DAILY PRN
Qty: 30 TABLET | Refills: 5 | Status: SHIPPED | OUTPATIENT
Start: 2023-01-06 | End: 2024-03-06

## 2023-01-06 RX ORDER — PROPRANOLOL HYDROCHLORIDE 10 MG/1
10 TABLET ORAL 2 TIMES DAILY
Qty: 60 TABLET | Refills: 11 | Status: SHIPPED | OUTPATIENT
Start: 2023-01-06 | End: 2024-02-08 | Stop reason: SDUPTHER

## 2023-01-06 NOTE — PROGRESS NOTES
The patient location is: home  The chief complaint leading to consultation is:    Chief Complaint   Patient presents with    Annual Exam        HPI:  Last visit 2020     Anxiety, was on Lexapro but stop secondary to weight gain.  Saw Dr. Ribera in 2021, started on Zoloft.  Recently saw psychologist 09/27/2022.  Currently on Zoloft taking 100 mg daily.  Did start a new business, lots of stress.  She does feel like she needs an increase in medication    Propranolol occasionally for anxiety and headaches     Hydrochlorothiazide for fluid retention    Linzess for IBS with chronic constipation, has seen GI February 2022    Acne, followed by Dermatology     Past Medical History:   Diagnosis Date    Acne     Anxiety     Chronic constipation     Herpes labialis 12/7/2016    Hypertension        No past surgical history on file.    Family History   Problem Relation Age of Onset    Hypertension Maternal Grandmother     Cancer Neg Hx     Diabetes Neg Hx     Breast cancer Neg Hx     Colon cancer Neg Hx     Ovarian cancer Neg Hx     Cervical cancer Neg Hx     Endometrial cancer Neg Hx     Vaginal cancer Neg Hx     Melanoma Neg Hx        Social History     Socioeconomic History    Marital status: Single   Occupational History     Comment: [employer]plant- [employer]   Tobacco Use    Smoking status: Never    Smokeless tobacco: Never   Substance and Sexual Activity    Alcohol use: Yes     Alcohol/week: 0.0 standard drinks     Comment: Socially.    Drug use: No    Sexual activity: Yes     Partners: Male     Birth control/protection: Condom     Lab Results   Component Value Date    WBC 4.82 07/22/2019    HGB 11.5 (L) 07/22/2019    HCT 38.3 07/22/2019    MCV 84 07/22/2019     07/22/2019    CHOL 156 07/22/2019    TRIG 62 07/22/2019    HDL 52 07/22/2019    ALT 17 07/22/2019    AST 17 07/22/2019    BILITOT 1.0 07/22/2019    ALKPHOS 71 07/22/2019     05/27/2019    K 4.1 05/27/2019     05/27/2019    CREATININE 0.8 05/27/2019     ESTGFRAFRICA >60.0 05/27/2019    EGFRNONAA >60.0 05/27/2019    CALCIUM 9.8 05/27/2019    ALBUMIN 3.5 07/22/2019    BUN 19 05/27/2019    CO2 28 05/27/2019    TSH 0.997 04/29/2019    INR 1.0 11/16/2016    HGBA1C 4.9 04/13/2018    LDLCALC 91.6 07/22/2019    GLU 91 05/27/2019                Vital signs reviewed  PE:   APPEARANCE: Well nourished, well developed, in no acute distress.    HEAD: Normocephalic, atraumatic.  EYES: PERRL. EOMI.   Conjunctivae noninjected.  EARS: TM's intact. Light reflex normal. No retraction or perforation  NOSE: Mucosa pink. Airway clear.  MOUTH & THROAT: No tonsillar enlargement. No pharyngeal erythema or exudate.   NECK: Supple with no cervical lymphadenopathy.    CHEST: Good inspiratory effort. Lungs clear to auscultation with no wheezes or crackles.  CARDIOVASCULAR: Normal S1, S2. No rubs, murmurs, or gallops.  ABDOMEN: Bowel sounds normal. Not distended. Soft. No tenderness or masses. No organomegaly.  EXTREMITIES: No edema          IMPRESSION  1. Routine general medical examination at a health care facility    2. Anxiety    3. Encounter for hepatitis C screening test for low risk patient    4. Screening for HIV without presence of risk factors              PLAN     Medications Ordered This Encounter   Medications    hydrOXYzine HCL (ATARAX) 25 MG tablet     Sig: Take 1 tablet (25 mg total) by mouth 3 (three) times daily as needed for Itching.     Dispense:  30 tablet     Refill:  5    propranoloL (INDERAL) 10 MG tablet     Sig: Take 1 tablet (10 mg total) by mouth 2 (two) times daily.     Dispense:  60 tablet     Refill:  11     .    sertraline (ZOLOFT) 100 MG tablet     Sig: Take 2 tablets (200 mg total) by mouth once daily.     Dispense:  60 tablet     Refill:  11     Orders Placed This Encounter   Procedures    CBC Auto Differential    Comprehensive Metabolic Panel    Lipid Panel    TSH    Hemoglobin A1C    Hepatitis C Antibody    HIV 1/2 Ag/Ab (4th Gen)     Screening labs above      Blood pressure stable     Anxiety: Increase Zoloft to 150 mg daily.  Over the next few months if no improvement can increase to 200 mg daily.  Was given some other resources to look for counseling as she feels like she would like to see another therapist    Refill propranolol and hydroxyzine (takes occasionally for hives)      SCREENINGS  Pap Dr. Montemayor 1/6/21     [facility]  tdap 2016  COVID: X3 , encourage booster  Flu , has not had, declines today

## 2023-01-06 NOTE — PATIENT INSTRUCTIONS
Opportunities to get set up for counseling services       1. Employee Assistance Program (EAP) through work (can check with your human resources dept to see what counseling services are available through this.)    2. Through insurance company list of covered counselors/social workers    3. [employer] website database of mental health providers    4. Counseling through [facility] Contact Information (looking for psychologist or  for talk therapy)  Phone        [phone]        Address [address] [facility] [address]  [facility] - [address]      List of some mental health providers in the area

## 2023-01-12 ENCOUNTER — LAB VISIT (OUTPATIENT)
Dept: LAB | Facility: HOSPITAL | Age: 38
End: 2023-01-12
Attending: FAMILY MEDICINE
Payer: MEDICAID

## 2023-01-12 DIAGNOSIS — Z11.59 ENCOUNTER FOR HEPATITIS C SCREENING TEST FOR LOW RISK PATIENT: ICD-10-CM

## 2023-01-12 DIAGNOSIS — Z11.4 SCREENING FOR HIV WITHOUT PRESENCE OF RISK FACTORS: ICD-10-CM

## 2023-01-12 DIAGNOSIS — Z00.00 ROUTINE GENERAL MEDICAL EXAMINATION AT A HEALTH CARE FACILITY: ICD-10-CM

## 2023-01-12 DIAGNOSIS — F41.9 ANXIETY: ICD-10-CM

## 2023-01-12 LAB
ALBUMIN SERPL BCP-MCNC: 3.7 G/DL (ref 3.5–5.2)
ALP SERPL-CCNC: 60 U/L (ref 55–135)
ALT SERPL W/O P-5'-P-CCNC: 12 U/L (ref 10–44)
ANION GAP SERPL CALC-SCNC: 7 MMOL/L (ref 8–16)
AST SERPL-CCNC: 18 U/L (ref 10–40)
BASOPHILS # BLD AUTO: 0.02 K/UL (ref 0–0.2)
BASOPHILS NFR BLD: 0.6 % (ref 0–1.9)
BILIRUB SERPL-MCNC: 1.1 MG/DL (ref 0.1–1)
BUN SERPL-MCNC: 10 MG/DL (ref 6–20)
CALCIUM SERPL-MCNC: 9.4 MG/DL (ref 8.7–10.5)
CHLORIDE SERPL-SCNC: 105 MMOL/L (ref 95–110)
CHOLEST SERPL-MCNC: 181 MG/DL (ref 120–199)
CHOLEST/HDLC SERPL: 2.7 {RATIO} (ref 2–5)
CO2 SERPL-SCNC: 25 MMOL/L (ref 23–29)
CREAT SERPL-MCNC: 0.8 MG/DL (ref 0.5–1.4)
DIFFERENTIAL METHOD: ABNORMAL
EOSINOPHIL # BLD AUTO: 0.3 K/UL (ref 0–0.5)
EOSINOPHIL NFR BLD: 8.2 % (ref 0–8)
ERYTHROCYTE [DISTWIDTH] IN BLOOD BY AUTOMATED COUNT: 13.8 % (ref 11.5–14.5)
EST. GFR  (NO RACE VARIABLE): >60 ML/MIN/1.73 M^2
ESTIMATED AVG GLUCOSE: 88 MG/DL (ref 68–131)
GLUCOSE SERPL-MCNC: 80 MG/DL (ref 70–110)
HBA1C MFR BLD: 4.7 % (ref 4–5.6)
HCT VFR BLD AUTO: 38.7 % (ref 37–48.5)
HCV AB SERPL QL IA: NORMAL
HDLC SERPL-MCNC: 66 MG/DL (ref 40–75)
HDLC SERPL: 36.5 % (ref 20–50)
HGB BLD-MCNC: 12.2 G/DL (ref 12–16)
HIV 1+2 AB+HIV1 P24 AG SERPL QL IA: NORMAL
IMM GRANULOCYTES # BLD AUTO: 0.02 K/UL (ref 0–0.04)
IMM GRANULOCYTES NFR BLD AUTO: 0.6 % (ref 0–0.5)
LDLC SERPL CALC-MCNC: 106.2 MG/DL (ref 63–159)
LYMPHOCYTES # BLD AUTO: 0.9 K/UL (ref 1–4.8)
LYMPHOCYTES NFR BLD: 27.3 % (ref 18–48)
MCH RBC QN AUTO: 25.9 PG (ref 27–31)
MCHC RBC AUTO-ENTMCNC: 31.5 G/DL (ref 32–36)
MCV RBC AUTO: 82 FL (ref 82–98)
MONOCYTES # BLD AUTO: 0.4 K/UL (ref 0.3–1)
MONOCYTES NFR BLD: 10.9 % (ref 4–15)
NEUTROPHILS # BLD AUTO: 1.8 K/UL (ref 1.8–7.7)
NEUTROPHILS NFR BLD: 52.4 % (ref 38–73)
NONHDLC SERPL-MCNC: 115 MG/DL
NRBC BLD-RTO: 0 /100 WBC
PLATELET # BLD AUTO: 357 K/UL (ref 150–450)
PMV BLD AUTO: 9.2 FL (ref 9.2–12.9)
POTASSIUM SERPL-SCNC: 4.4 MMOL/L (ref 3.5–5.1)
PROT SERPL-MCNC: 7.7 G/DL (ref 6–8.4)
RBC # BLD AUTO: 4.71 M/UL (ref 4–5.4)
SODIUM SERPL-SCNC: 137 MMOL/L (ref 136–145)
TRIGL SERPL-MCNC: 44 MG/DL (ref 30–150)
TSH SERPL DL<=0.005 MIU/L-ACNC: 1.22 UIU/ML (ref 0.4–4)
WBC # BLD AUTO: 3.41 K/UL (ref 3.9–12.7)

## 2023-01-12 PROCEDURE — 85025 COMPLETE CBC W/AUTO DIFF WBC: CPT | Performed by: FAMILY MEDICINE

## 2023-01-12 PROCEDURE — 36415 COLL VENOUS BLD VENIPUNCTURE: CPT | Performed by: FAMILY MEDICINE

## 2023-01-12 PROCEDURE — 80061 LIPID PANEL: CPT | Performed by: FAMILY MEDICINE

## 2023-01-12 PROCEDURE — 80053 COMPREHEN METABOLIC PANEL: CPT | Performed by: FAMILY MEDICINE

## 2023-01-12 PROCEDURE — 87389 HIV-1 AG W/HIV-1&-2 AB AG IA: CPT | Performed by: FAMILY MEDICINE

## 2023-01-12 PROCEDURE — 83036 HEMOGLOBIN GLYCOSYLATED A1C: CPT | Performed by: FAMILY MEDICINE

## 2023-01-12 PROCEDURE — 84443 ASSAY THYROID STIM HORMONE: CPT | Performed by: FAMILY MEDICINE

## 2023-01-12 PROCEDURE — 86803 HEPATITIS C AB TEST: CPT | Performed by: FAMILY MEDICINE

## 2023-01-13 ENCOUNTER — PATIENT MESSAGE (OUTPATIENT)
Dept: FAMILY MEDICINE | Facility: CLINIC | Age: 38
End: 2023-01-13

## 2023-01-13 ENCOUNTER — E-VISIT (OUTPATIENT)
Dept: FAMILY MEDICINE | Facility: CLINIC | Age: 38
End: 2023-01-13
Payer: MEDICAID

## 2023-01-13 DIAGNOSIS — J06.9 UPPER RESPIRATORY TRACT INFECTION, UNSPECIFIED TYPE: Primary | ICD-10-CM

## 2023-01-13 PROCEDURE — 99421 PR E&M, ONLINE DIGIT, EST, < 7 DAYS, 5-10 MINS: ICD-10-PCS | Mod: ,,, | Performed by: FAMILY MEDICINE

## 2023-01-13 PROCEDURE — 99421 OL DIG E/M SVC 5-10 MIN: CPT | Mod: ,,, | Performed by: FAMILY MEDICINE

## 2023-01-13 RX ORDER — FLUTICASONE PROPIONATE 50 MCG
2 SPRAY, SUSPENSION (ML) NASAL DAILY
Qty: 16 G | Refills: 2 | Status: SHIPPED | OUTPATIENT
Start: 2023-01-13

## 2023-01-13 NOTE — PROGRESS NOTES
Patient ID: Doroteo Sun is a 37 y.o. female.    Chief Complaint: URI    The patient initiated a request through [facility] on 1/13/2023 for evaluation and management with a chief complaint of URI     I evaluated the questionnaire submission on 01/13/2023    Hi received your recent questionnaire regarding upper respiratory symptoms including headache, nasal congestion, runny nose since 01/11/2023.  I saw that you had mention you took a COVID test which was negativeAnd also a flu test which was negative.  We have been seeing a lot of upper respiratory viral infections around this time.  These usually contribute to symptoms like headaches, pressure nasal congestion, runny nose, coughing and may include other things like sore throat sometimes body aches and low-grade temperature.  If you took the COVID test early from home it may not be a bad idea retest after a day because we have been seeing some false negative testing if done too early in the course of the infection.  If you have a positive COVID test on follow-up, there is antiviral that might be useful.  If it is negative COVID still on the 2nd time around, it could be 1 of the other respiratory viruses that have been circulating around.  I have attached some information that breakdown by symptom below that you can try.  In addition to the medications listed below, we can try some Flonase 2 sprays each nostril once a day.  I saw that you had an old prescription for this, not sure if you still have this at home but if not I will send another prescription to the pharmacy for you.  I would recommend doing this regularly for the next week or 2.  Additionally you can try Neti pot sinus rinses.  Very temporarily could do would over-the-counter decongestant nasal spray like Afrin that may help more immediately help with congestion and sinus pressure but I would not do this for more than 3 days or so because it can cause some rebound congestion of done beyond that.  If  you are having symptoms lasting longer the 7-10 days, having high fevers developing, or other symptoms like increasing shortness of breath, you might need further evaluation to check for other infection like pneumonia.        Medications Ordered This Encounter   Medications    fluticasone propionate (FLONASE) 50 mcg/actuation nasal spray     Si sprays (100 mcg total) by Each Nostril route once daily.     Dispense:  16 g     Refill:  2       [facility] [address] [place] [id number]  Phone: [phone] Fax: [phone]        Viral Upper respiratory tract infections    Most upper respiratory infections are in fact caused by viruses.  Unfortunately as there is no treatment to eradicate these viruses, treatment for upper respiratory infections relies on control of symptoms.      SORE THROAT:  You may take throat lozenges such as Halls cough drops for sore throat pain.  Also, gargling with warm salt water may help with pain symptoms also.  Chloraseptic Spray is an over-the-counter anesthetic spray that may provide relief for sore throat pain. Over-the-counter pain relievers such as Tylenol (acetaminophen), Motrin/Advil  (ibuprofen), or Aleve (naproxen) may also provide relief for sore throat pain. These medications will also help for body aches and/or fever.      NASAL CONGESTION  You may try an oral decongestant such as pseudoephedrine ( brand-name Sudafed).  Note that this medication is available only behind the pharmacy counter.  There are some over-the-counter decongestants with a medicine called phenylephrine but these may not be as effective.  There are some antihistamine/decongestant combination medications (for example, Claritin-D, Zyrtec-D, Allegra-D).  Since these medications already have pseudoephedrine in them, you may take these in place of plain Sudafed.  These may be effective additionally if there is early element of allergy symptoms  contributing to your nasal congestion. Although there is a caution  about using decongestants in individuals with hypertension, if you are stable on blood pressure medications and her blood pressure is well-controlled, temporary use of these medications should be okay just as long as you continue to monitor your blood pressures.  If your blood pressure is not controlled on medication therapy, you may need to avoid these pseudoephedrine-containing medications and try antihistamines alone such as plain Claritin, Zyrtec, Allegra.     You may also use a device called a Neti Pot to help with nasal congestion.  This is an effective treatment for helping clear out nasal congestion and drainage.  A Neti pot is a device in which a saltwater solution is flushed through the nasal passages to help clear out any congestion and help relieve swelling.  You may buy this at any pharmacy.   It is safe to take even if you  have uncontrolled hypertension or are taking certain medications and cannot take other therapies as listed above. However, it is important that she is sterile or distilled water to mix with the solution given to avoid any chance of contamination during the flushing process.    Cough  Cough is a common symptom with most upper respiratory tract infections. Many times this can occur late in the process of a cold and may be the last symptom to resolve.  This is usually a typical pattern for the upper respiratory virus, and usually not a sign that you have a chest infection.  You may have some coughing with mucus.  While no treatments are proven to be absolutely beneficial for cough, some available therapies include guaifenesin with dextromethorphan ( Mucinex DM, Robitussin-DM). This may help to settle the cough and to thin out any mucus associated with the cough.  It is important to stay well hydrated to avoid having the mucus become very thick and difficult to cough up. Many times the cough aspect of an upper  respiratory infection may take up to 2 weeks to completely clear.       Most viral upper respiratory infections may take up to 7-10 days to clear.  Many people get better before this time, but some may take the full 7-10 days to completely get better.        Signs that may indicate something more than an upper respiratory viral infection may include:    - Persistent high fever above 100.4 ( note that early low-grade fever may even occur with a viral upper respiratory infection but usually gets better within a few days)    - While discolored mucus such as yellow or green drainage from the nose is not a sign by itself of a bacterial infection, if you find that symptoms are lasting longer than 7-10 days and nasal drainage is becoming progressively more thick and discolored, this may be indicative of a bacterial sinus infection    - If overall your congestion and headache symptoms get worse beyond the 7-10 day time frame    - If your symptoms started to improve by the  7-10 day time frame but then suddenly got worse      [facility] Peq Evisit Upper Respitatory/Cough Questionnaire    1/13/2023 12:30 PM CST - Filed by Patient   Do you agree to participate in an E-Visit? Yes   If you have any of the following symptoms, please present to your local ER or call 911:  I acknowledge   What is the main issue that you would like for your doctor to address today? Sinuses   Are you able to take your vital signs? No   What symptoms do you currently have?  Headache;  Nasal Congestion;  Runny nose   Have you had a fever? No   When did your symptoms first appear? 1/11/2023   In the last two weeks, have you been in close contact with someone who has COVID-19 or the Flu? No   In the last two weeks, have you worked or volunteered in a healthcare facility or as a ? Healthcare facilities include a hospital, medical or dental clinic, long-term care facility, or nursing home No   Do you live in a long-term care facility, nursing home,  or homeless shelter? No   List what you have done or taken to help your symptoms. Cold & flu   How severe are your symptoms? Severe   Have you taken an at home Covid test? Yes   What were the results? Negative   Have you taken a Flu test? Yes   What were the results? Negative   Have you been fully vaccinated for COVID? (2 Pfizer, 2 Moderna or 1 Bryan & Bryan vaccine injections) Yes   Have you received a booster? No   Have you recieved a Flu shot? No   Do you have transportation to get tested for COVID if it is indicated and ordered for you at an [facility] location? Yes   Are you currently pregnant, could you be pregnant, or are you breast feeding? None of the above   Provide any information you feel is important to your history not asked above    Please attach any relevant images or files           Active Problem List with Overview Notes    Diagnosis Date Noted    Wears contact lenses 10/14/2022    Chronic idiopathic constipation 05/17/2018    Anxiety     Herpes labialis 12/07/2016    Elevated BP without diagnosis of hypertension 11/16/2016      Recent Labs Obtained:  Lab Visit on 01/12/2023   Component Date Value Ref Range Status    WBC 01/12/2023 3.41 (L)  3.90 - 12.70 K/uL Final    RBC 01/12/2023 4.71  4.00 - 5.40 M/uL Final    Hemoglobin 01/12/2023 12.2  12.0 - 16.0 g/dL Final    Hematocrit 01/12/2023 38.7  37.0 - 48.5 % Final    MCV 01/12/2023 82  82 - 98 fL Final    MCH 01/12/2023 25.9 (L)  27.0 - 31.0 pg Final    MCHC 01/12/2023 31.5 (L)  32.0 - 36.0 g/dL Final    RDW 01/12/2023 13.8  11.5 - 14.5 % Final    Platelets 01/12/2023 357  150 - 450 K/uL Final    MPV 01/12/2023 9.2  9.2 - 12.9 fL Final    Immature Granulocytes 01/12/2023 0.6 (H)  0.0 - 0.5 % Final    Gran # (ANC) 01/12/2023 1.8  1.8 - 7.7 K/uL Final    Immature Grans (Abs) 01/12/2023 0.02  0.00 - 0.04 K/uL Final    Comment: Mild elevation in immature granulocytes is non specific and   can be seen in a variety of conditions including stress  response,   acute inflammation, trauma and pregnancy. Correlation with other   laboratory and clinical findings is essential.      Lymph # 01/12/2023 0.9 (L)  1.0 - 4.8 K/uL Final    Mono # 01/12/2023 0.4  0.3 - 1.0 K/uL Final    Eos # 01/12/2023 0.3  0.0 - 0.5 K/uL Final    Baso # 01/12/2023 0.02  0.00 - 0.20 K/uL Final    nRBC 01/12/2023 0  0 /100 WBC Final    Gran % 01/12/2023 52.4  38.0 - 73.0 % Final    Lymph % 01/12/2023 27.3  18.0 - 48.0 % Final    Mono % 01/12/2023 10.9  4.0 - 15.0 % Final    Eosinophil % 01/12/2023 8.2 (H)  0.0 - 8.0 % Final    Basophil % 01/12/2023 0.6  0.0 - 1.9 % Final    Differential Method 01/12/2023 Automated   Final    Sodium 01/12/2023 137  136 - 145 mmol/L Final    Potassium 01/12/2023 4.4  3.5 - 5.1 mmol/L Final    Chloride 01/12/2023 105  95 - 110 mmol/L Final    CO2 01/12/2023 25  23 - 29 mmol/L Final    Glucose 01/12/2023 80  70 - 110 mg/dL Final    BUN 01/12/2023 10  6 - 20 mg/dL Final    Creatinine 01/12/2023 0.8  0.5 - 1.4 mg/dL Final    Calcium 01/12/2023 9.4  8.7 - 10.5 mg/dL Final    Total Protein 01/12/2023 7.7  6.0 - 8.4 g/dL Final    Albumin 01/12/2023 3.7  3.5 - 5.2 g/dL Final    Total Bilirubin 01/12/2023 1.1 (H)  0.1 - 1.0 mg/dL Final    Comment: For infants and newborns, interpretation of results should be based  on gestational age, weight and in agreement with clinical  observations.    Premature Infant recommended reference ranges:  Up to 24 hours.............<8.0 mg/dL  Up to 48 hours............<12.0 mg/dL  3-5 days..................<15.0 mg/dL  6-29 days.................<15.0 mg/dL      Alkaline Phosphatase 01/12/2023 60  55 - 135 U/L Final    AST 01/12/2023 18  10 - 40 U/L Final    ALT 01/12/2023 12  10 - 44 U/L Final    Anion Gap 01/12/2023 7 (L)  8 - 16 mmol/L Final    eGFR 01/12/2023 >60.0  >60 mL/min/1.73 m^2 Final    Cholesterol 01/12/2023 181  120 - 199 mg/dL Final    Comment: The National Cholesterol Education Program (NCEP) has set the  following  guidelines (reference ranges) for Cholesterol:  Optimal.....................<200 mg/dL  Borderline High.............200-239 mg/dL  High........................> or = 240 mg/dL      Triglycerides 01/12/2023 44  30 - 150 mg/dL Final    Comment: The National Cholesterol Education Program (NCEP) has set the  following guidelines (reference values) for triglycerides:  Normal......................<150 mg/dL  Borderline High.............150-199 mg/dL  High........................200-499 mg/dL      HDL 01/12/2023 66  40 - 75 mg/dL Final    Comment: The National Cholesterol Education Program (NCEP) has set the  following guidelines (reference values) for HDL Cholesterol:  Low...............<40 mg/dL  Optimal...........>60 mg/dL      LDL Cholesterol 01/12/2023 106.2  63.0 - 159.0 mg/dL Final    Comment: The National Cholesterol Education Program (NCEP) has set the  following guidelines (reference values) for LDL Cholesterol:  Optimal.......................<130 mg/dL  Borderline High...............130-159 mg/dL  High..........................160-189 mg/dL  Very High.....................>190 mg/dL      HDL/Cholesterol Ratio 01/12/2023 36.5  20.0 - 50.0 % Final    Total Cholesterol/HDL Ratio 01/12/2023 2.7  2.0 - 5.0 Final    Non-HDL Cholesterol 01/12/2023 115  mg/dL Final    Comment: Risk category and Non-HDL cholesterol goals:  Coronary heart disease (CHD)or equivalent (10-year risk of CHD >20%):  Non-HDL cholesterol goal     <130 mg/dL  Two or more CHD risk factors and 10-year risk of CHD <= 20%:  Non-HDL cholesterol goal     <160 mg/dL  0 to 1 CHD risk factor:  Non-HDL cholesterol goal     <190 mg/dL      TSH 01/12/2023 1.218  0.400 - 4.000 uIU/mL Final    Hemoglobin A1C 01/12/2023 4.7  4.0 - 5.6 % Final    Comment: ADA Screening Guidelines:  5.7-6.4%  Consistent with prediabetes  >or=6.5%  Consistent with diabetes    High levels of fetal hemoglobin interfere with the HbA1C  assay. Heterozygous hemoglobin variants (HbS, HgC,  etc)do  not significantly interfere with this assay.   However, presence of multiple variants may affect accuracy.      Estimated Avg Glucose 2023 88  68 - 131 mg/dL Final    Hepatitis C Ab 2023 Non-reactive  Non-reactive Final    HIV 1/2 Ag/Ab 2023 Non-reactive  Non-reactive Final       Encounter Diagnosis   Name Primary?    Upper respiratory tract infection, unspecified type Yes        No orders of the defined types were placed in this encounter.     Medications Ordered This Encounter   Medications    fluticasone propionate (FLONASE) 50 mcg/actuation nasal spray     Si sprays (100 mcg total) by Each Nostril route once daily.     Dispense:  16 g     Refill:  2        E-Visit Time Tracking:    Day 1 Time (in minutes): 5     Total Time (in minutes): 5

## 2023-01-17 RX ORDER — OFLOXACIN 3 MG/ML
2 SOLUTION/ DROPS OPHTHALMIC 4 TIMES DAILY
Qty: 5 ML | Refills: 0 | Status: SHIPPED | OUTPATIENT
Start: 2023-01-17 | End: 2023-01-22

## 2023-03-10 ENCOUNTER — OFFICE VISIT (OUTPATIENT)
Dept: FAMILY MEDICINE | Facility: CLINIC | Age: 38
End: 2023-03-10
Payer: MEDICAID

## 2023-03-10 DIAGNOSIS — F41.9 ANXIETY: Primary | ICD-10-CM

## 2023-03-10 DIAGNOSIS — I10 HYPERTENSION, UNSPECIFIED TYPE: ICD-10-CM

## 2023-03-10 PROCEDURE — 3044F PR MOST RECENT HEMOGLOBIN A1C LEVEL <7.0%: ICD-10-PCS | Mod: CPTII,95,, | Performed by: FAMILY MEDICINE

## 2023-03-10 PROCEDURE — 99214 OFFICE O/P EST MOD 30 MIN: CPT | Mod: 95,,, | Performed by: FAMILY MEDICINE

## 2023-03-10 PROCEDURE — 3044F HG A1C LEVEL LT 7.0%: CPT | Mod: CPTII,95,, | Performed by: FAMILY MEDICINE

## 2023-03-10 PROCEDURE — 99214 PR OFFICE/OUTPT VISIT, EST, LEVL IV, 30-39 MIN: ICD-10-PCS | Mod: 95,,, | Performed by: FAMILY MEDICINE

## 2023-03-10 NOTE — PROGRESS NOTES
The patient location is: home  The chief complaint leading to consultation is: anxiety f/u    Visit type: Virtual visit with synchronous audio and video  Total time spent with patient:  10 min  Each patient to whom he or she provides medical services by telemedicine is:  (1) informed of the relationship between the physician and patient and the respective role of any other health care provider with respect to management of the patient; and (2) notified that he or she may decline to receive medical services by telemedicine and may withdraw from such care at any time.    (Portions of this note were dictated using voice recognition software and may contain dictation related errors in spelling/grammar/syntax not found on text review)         HPI: 37 y.o. female seen in January for annual exam.  Had anxiety concerns with new business, lots of stress.  On Zoloft which we increased to 150 mg daily with option to go up to 200 mg daily.        Prior was on Lexapro but stopped secondary to weight gain    Doing well with zoloft 150, mood stable. Was having urticaria likely from anxiety but this has been better. Has hydroxyzine for itching in case she needs it.  BP stable. (Propranolol and hctz).       Past Medical History:   Diagnosis Date    Acne     Anxiety     Chronic constipation     Herpes labialis 12/7/2016    Hypertension        No past surgical history on file.    Family History   Problem Relation Age of Onset    Hypertension Maternal Grandmother     Cancer Neg Hx     Diabetes Neg Hx     Breast cancer Neg Hx     Colon cancer Neg Hx     Ovarian cancer Neg Hx     Cervical cancer Neg Hx     Endometrial cancer Neg Hx     Vaginal cancer Neg Hx     Melanoma Neg Hx        Social History     Tobacco Use    Smoking status: Never    Smokeless tobacco: Never   Substance Use Topics    Alcohol use: Yes     Alcohol/week: 0.0 standard drinks     Comment: Socially.    Drug use: No       Lab Results   Component Value Date    WBC 3.41  (L) 01/12/2023    HGB 12.2 01/12/2023    HCT 38.7 01/12/2023    MCV 82 01/12/2023     01/12/2023    CHOL 181 01/12/2023    TRIG 44 01/12/2023    HDL 66 01/12/2023    ALT 12 01/12/2023    AST 18 01/12/2023    BILITOT 1.1 (H) 01/12/2023    ALKPHOS 60 01/12/2023     01/12/2023    K 4.4 01/12/2023     01/12/2023    CREATININE 0.8 01/12/2023    ESTGFRAFRICA >60.0 05/27/2019    EGFRNONAA >60.0 05/27/2019    CALCIUM 9.4 01/12/2023    ALBUMIN 3.7 01/12/2023    BUN 10 01/12/2023    CO2 25 01/12/2023    TSH 1.218 01/12/2023    INR 1.0 11/16/2016    HGBA1C 4.7 01/12/2023    LDLCALC 106.2 01/12/2023    GLU 80 01/12/2023                PE (elements able to be captured on virtual encounter)  APPEARANCE: Well nourished, well developed, in no acute distress.    HEAD: Normocephalic, atraumatic.  EYES:  .   Conjunctivae noninjected.  RESPIRATORY:  No increased work of breathing or objective visual signs of dyspnea  PSYCHIATRIC:  Normal mood and affect, alert and oriented, appropriate answers to questions      IMPRESSION  1. Anxiety    2. Hypertension, unspecified type            PLAN  Continue zoloft 150 mg daily  Continue bp check  Hydroxyzine prn itching  Reviewed labs above, stable.   Can rtc prn for annual wellness or sooner if needed.       Answers submitted by the patient for this visit:  Review of Systems Questionnaire (Submitted on 3/10/2023)  activity change: No  unexpected weight change: No  neck pain: No  hearing loss: No  rhinorrhea: No  trouble swallowing: No  eye discharge: No  visual disturbance: No  chest tightness: No  wheezing: No  chest pain: No  palpitations: No  blood in stool: No  constipation: No  vomiting: No  diarrhea: No  polydipsia: No  polyuria: No  difficulty urinating: No  hematuria: No  menstrual problem: No  dysuria: No  joint swelling: No  arthralgias: No  headaches: No  weakness: No  confusion: No  dysphoric mood: No

## 2023-08-01 DIAGNOSIS — G89.29 CHRONIC BILATERAL LOW BACK PAIN WITHOUT SCIATICA: Primary | ICD-10-CM

## 2023-08-01 DIAGNOSIS — M54.50 CHRONIC BILATERAL LOW BACK PAIN WITHOUT SCIATICA: Primary | ICD-10-CM

## 2023-08-01 RX ORDER — CELECOXIB 200 MG/1
200 CAPSULE ORAL DAILY PRN
Qty: 60 CAPSULE | Refills: 2 | Status: SHIPPED | OUTPATIENT
Start: 2023-08-01 | End: 2023-08-02

## 2023-08-02 ENCOUNTER — PATIENT MESSAGE (OUTPATIENT)
Dept: FAMILY MEDICINE | Facility: CLINIC | Age: 38
End: 2023-08-02
Payer: MEDICAID

## 2023-08-02 RX ORDER — IBUPROFEN 800 MG/1
800 TABLET ORAL 3 TIMES DAILY PRN
Qty: 60 TABLET | Refills: 1 | Status: SHIPPED | OUTPATIENT
Start: 2023-08-02 | End: 2024-02-08 | Stop reason: SDUPTHER

## 2023-10-05 ENCOUNTER — PATIENT MESSAGE (OUTPATIENT)
Dept: FAMILY MEDICINE | Facility: CLINIC | Age: 38
End: 2023-10-05
Payer: MEDICAID

## 2023-10-06 ENCOUNTER — OFFICE VISIT (OUTPATIENT)
Dept: FAMILY MEDICINE | Facility: CLINIC | Age: 38
End: 2023-10-06
Attending: FAMILY MEDICINE
Payer: MEDICAID

## 2023-10-06 VITALS
SYSTOLIC BLOOD PRESSURE: 118 MMHG | HEIGHT: 66 IN | TEMPERATURE: 98 F | RESPIRATION RATE: 18 BRPM | DIASTOLIC BLOOD PRESSURE: 84 MMHG | WEIGHT: 192.44 LBS | BODY MASS INDEX: 30.93 KG/M2

## 2023-10-06 DIAGNOSIS — R42 DIZZINESS: ICD-10-CM

## 2023-10-06 DIAGNOSIS — F41.9 ANXIETY: ICD-10-CM

## 2023-10-06 DIAGNOSIS — R42 VERTIGO: Primary | ICD-10-CM

## 2023-10-06 PROCEDURE — 1159F MED LIST DOCD IN RCRD: CPT | Mod: CPTII,,, | Performed by: FAMILY MEDICINE

## 2023-10-06 PROCEDURE — 99214 OFFICE O/P EST MOD 30 MIN: CPT | Mod: PBBFAC,PO | Performed by: FAMILY MEDICINE

## 2023-10-06 PROCEDURE — 99999 PR PBB SHADOW E&M-EST. PATIENT-LVL IV: CPT | Mod: PBBFAC,,, | Performed by: FAMILY MEDICINE

## 2023-10-06 PROCEDURE — 3079F PR MOST RECENT DIASTOLIC BLOOD PRESSURE 80-89 MM HG: ICD-10-PCS | Mod: CPTII,,, | Performed by: FAMILY MEDICINE

## 2023-10-06 PROCEDURE — 3044F HG A1C LEVEL LT 7.0%: CPT | Mod: CPTII,,, | Performed by: FAMILY MEDICINE

## 2023-10-06 PROCEDURE — 3044F PR MOST RECENT HEMOGLOBIN A1C LEVEL <7.0%: ICD-10-PCS | Mod: CPTII,,, | Performed by: FAMILY MEDICINE

## 2023-10-06 PROCEDURE — 1160F RVW MEDS BY RX/DR IN RCRD: CPT | Mod: CPTII,,, | Performed by: FAMILY MEDICINE

## 2023-10-06 PROCEDURE — 3008F BODY MASS INDEX DOCD: CPT | Mod: CPTII,,, | Performed by: FAMILY MEDICINE

## 2023-10-06 PROCEDURE — 3074F PR MOST RECENT SYSTOLIC BLOOD PRESSURE < 130 MM HG: ICD-10-PCS | Mod: CPTII,,, | Performed by: FAMILY MEDICINE

## 2023-10-06 PROCEDURE — 1159F PR MEDICATION LIST DOCUMENTED IN MEDICAL RECORD: ICD-10-PCS | Mod: CPTII,,, | Performed by: FAMILY MEDICINE

## 2023-10-06 PROCEDURE — 99999 PR PBB SHADOW E&M-EST. PATIENT-LVL IV: ICD-10-PCS | Mod: PBBFAC,,, | Performed by: FAMILY MEDICINE

## 2023-10-06 PROCEDURE — 3079F DIAST BP 80-89 MM HG: CPT | Mod: CPTII,,, | Performed by: FAMILY MEDICINE

## 2023-10-06 PROCEDURE — 99214 PR OFFICE/OUTPT VISIT, EST, LEVL IV, 30-39 MIN: ICD-10-PCS | Mod: S$PBB,,, | Performed by: FAMILY MEDICINE

## 2023-10-06 PROCEDURE — 3074F SYST BP LT 130 MM HG: CPT | Mod: CPTII,,, | Performed by: FAMILY MEDICINE

## 2023-10-06 PROCEDURE — 1160F PR REVIEW ALL MEDS BY PRESCRIBER/CLIN PHARMACIST DOCUMENTED: ICD-10-PCS | Mod: CPTII,,, | Performed by: FAMILY MEDICINE

## 2023-10-06 PROCEDURE — 99214 OFFICE O/P EST MOD 30 MIN: CPT | Mod: S$PBB,,, | Performed by: FAMILY MEDICINE

## 2023-10-06 PROCEDURE — 3008F PR BODY MASS INDEX (BMI) DOCUMENTED: ICD-10-PCS | Mod: CPTII,,, | Performed by: FAMILY MEDICINE

## 2023-10-06 RX ORDER — MECLIZINE HYDROCHLORIDE 25 MG/1
25 TABLET ORAL 3 TIMES DAILY PRN
Qty: 10 TABLET | Refills: 0 | Status: SHIPPED | OUTPATIENT
Start: 2023-10-06

## 2023-10-06 NOTE — PROGRESS NOTES
Subjective     Patient ID: Doroteo Sun is a 38 y.o. female.    Chief Complaint: Dizziness    38 yr old pleasant female with anxiety and other co morbidities presents today as new patient to me and for evaluation of dizziness/vertigo. Onset 1-2 weeks ago and intermittent. Worse with position changes and change in head motions. No nausea, headache, chest pain, SOB and LOC/seizures. Details as follows -    Dizziness:   Chronicity:  New  Onset:  1 to 4 weeks ago  Progression since onset:  Unchanged  Frequency:  Every few hours  Pain Scale:  3/10  Severity:  Mild  Duration:  Off/on all day  Dizziness characteristics:  Spacial disorientationno hearing loss, no fever, no headaches, no nausea, no vomiting, no diaphoresis, no weakness, no palpitations, no slurred speech and no chest pain.  Treatments tried:  Rest and body position changes  Improvements on treatment:  Mildno strokes, no head trauma, no ear trauma, no head trauma, no anxiety, no ear tubes, no environmental allergies and no CT head.    Review of Systems   Constitutional: Negative.  Negative for activity change, diaphoresis, fever and unexpected weight change.   HENT: Negative.  Negative for nasal congestion, ear discharge, hearing loss, rhinorrhea, sore throat and voice change.    Eyes: Negative.  Negative for pain, discharge and visual disturbance.   Respiratory: Negative.  Negative for chest tightness, shortness of breath and wheezing.    Cardiovascular: Negative.  Negative for chest pain and palpitations.   Gastrointestinal: Negative.  Negative for abdominal distention, anal bleeding, constipation, nausea and vomiting.   Endocrine: Negative.  Negative for cold intolerance, polydipsia and polyuria.   Genitourinary: Negative.  Negative for decreased urine volume, difficulty urinating, dysuria, frequency, menstrual problem and vaginal pain.   Musculoskeletal: Negative.  Negative for arthralgias, gait problem and myalgias.   Integumentary:  Negative for  color change, pallor and wound. Negative.   Allergic/Immunologic: Negative.  Negative for environmental allergies and immunocompromised state.   Neurological:  Positive for dizziness. Negative for tremors, seizures, speech difficulty, weakness and headaches.   Hematological: Negative.  Negative for adenopathy. Does not bruise/bleed easily.   Psychiatric/Behavioral: Negative.  Negative for agitation, confusion, decreased concentration, hallucinations, self-injury and suicidal ideas. The patient is not nervous/anxious.        Past Medical History:   Diagnosis Date    Acne     Anxiety     Chronic constipation     Herpes labialis 12/7/2016    Hypertension        History reviewed. No pertinent surgical history.    Family History   Problem Relation Age of Onset    Hypertension Maternal Grandmother     Cancer Neg Hx     Diabetes Neg Hx     Breast cancer Neg Hx     Colon cancer Neg Hx     Ovarian cancer Neg Hx     Cervical cancer Neg Hx     Endometrial cancer Neg Hx     Vaginal cancer Neg Hx     Melanoma Neg Hx        Social History     Socioeconomic History    Marital status: Single   Occupational History     Comment: Transplant- [employer]   Tobacco Use    Smoking status: Never    Smokeless tobacco: Never   Substance and Sexual Activity    Alcohol use: Yes     Alcohol/week: 0.0 standard drinks of alcohol     Comment: Socially.    Drug use: No    Sexual activity: Yes     Partners: Male     Birth control/protection: Condom       Current Outpatient Medications   Medication Sig Dispense Refill    clindamycin (CLEOCIN T) 1 % external solution Apply topically 2 (two) times daily. 30 mL 0    clotrimazole-betamethasone 1-0.05% (LOTRISONE) cream Apply topically 2 (two) times daily. 45 g 0    fluticasone propionate (FLONASE) 50 mcg/actuation nasal spray 2 sprays (100 mcg total) by Each Nostril route once daily. 16 g 2    gabapentin (NEURONTIN) 100 MG capsule Take 1 capsule (100 mg total) by mouth 2 (two) times daily as needed. 30  capsule 11    hydroCHLOROthiazide (HYDRODIURIL) 12.5 MG Tab Take 1 tablet (12.5 mg total) by mouth daily as needed (for fluid retention in legs). 30 tablet 11    hydrOXYzine HCL (ATARAX) 25 MG tablet Take 1 tablet (25 mg total) by mouth 3 (three) times daily as needed for Itching. 30 tablet 5    ibuprofen (ADVIL,MOTRIN) 800 MG tablet Take 1 tablet (800 mg total) by mouth 3 (three) times daily as needed for Pain. 60 tablet 1    linaCLOtide (LINZESS) 290 mcg Cap capsule Take 1 capsule (290 mcg total) by mouth before breakfast. 90 capsule 3    lubiprostone (AMITIZA) 8 MCG Cap Take 1 capsule (8 mcg total) by mouth 2 (two) times daily. 180 capsule 3    meclizine (ANTIVERT) 25 mg tablet Take 1 tablet (25 mg total) by mouth 3 (three) times daily as needed for Dizziness. 10 tablet 0    mupirocin (BACTROBAN) 2 % ointment Apply topically 2 (two) times daily. 22 g 0    propranoloL (INDERAL) 10 MG tablet Take 1 tablet (10 mg total) by mouth 2 (two) times daily. 60 tablet 11    sertraline (ZOLOFT) 100 MG tablet Take 2 tablets (200 mg total) by mouth once daily. 60 tablet 11    triamcinolone acetonide 0.1% (KENALOG) 0.1 % cream       trifarotene (AKLIEF) 0.005 % Crea Apply 1 Pump topically every evening. To entire face 45 g 5     No current facility-administered medications for this visit.       Review of patient's allergies indicates:   Allergen Reactions    Apple           Objective   Vitals:    10/06/23 1022   BP: 118/84   Resp: 18   Temp: 98 °F (36.7 °C)       Physical Exam  Constitutional:       General: She is not in acute distress.     Appearance: She is well-developed. She is not diaphoretic.   HENT:      Head: Normocephalic and atraumatic.      Right Ear: External ear normal.      Left Ear: External ear normal.      Nose: Nose normal.      Mouth/Throat:      Pharynx: No oropharyngeal exudate.   Eyes:      General: No scleral icterus.        Right eye: No discharge.         Left eye: No discharge.      Conjunctiva/sclera:  Conjunctivae normal.      Pupils: Pupils are equal, round, and reactive to light.   Neck:      Thyroid: No thyromegaly.      Vascular: No JVD.      Trachea: No tracheal deviation.   Cardiovascular:      Rate and Rhythm: Normal rate and regular rhythm.      Heart sounds: Normal heart sounds. No murmur heard.     No friction rub. No gallop.   Pulmonary:      Effort: Pulmonary effort is normal.      Breath sounds: Normal breath sounds. No stridor. No wheezing or rales.   Chest:      Chest wall: No tenderness.   Abdominal:      General: Bowel sounds are normal. There is no distension.      Palpations: Abdomen is soft. There is no mass.      Tenderness: There is no abdominal tenderness. There is no guarding or rebound.      Hernia: No hernia is present.   Musculoskeletal:         General: No tenderness. Normal range of motion.      Cervical back: Normal range of motion and neck supple.   Lymphadenopathy:      Cervical: No cervical adenopathy.   Skin:     General: Skin is warm and dry.      Coloration: Skin is not pale.      Findings: No erythema or rash.   Neurological:      Mental Status: She is alert and oriented to person, place, and time.      Cranial Nerves: No cranial nerve deficit.      Motor: No abnormal muscle tone.      Coordination: Coordination normal.      Deep Tendon Reflexes: Reflexes are normal and symmetric. Reflexes normal.   Psychiatric:         Behavior: Behavior normal.         Thought Content: Thought content normal.         Judgment: Judgment normal.            Assessment and Plan     1. Vertigo  -     meclizine (ANTIVERT) 25 mg tablet; Take 1 tablet (25 mg total) by mouth 3 (three) times daily as needed for Dizziness.  Dispense: 10 tablet; Refill: 0    2. Anxiety    3. Dizziness  -     meclizine (ANTIVERT) 25 mg tablet; Take 1 tablet (25 mg total) by mouth 3 (three) times daily as needed for Dizziness.  Dispense: 10 tablet; Refill: 0        Vertigo/dizziness  -home remedies  -trial of meclizine  prn.Side effects of medications have been discussed and patient agreed to proceed with treatment and understands the risks and benefits.  HEad CT, EKG, echo precautions given  -ENT and neurology and ER precautions given    Anxiety  -needs better control    Spent adequate time in obtaining history and explaining differentials      30 minutes spent during this visit of which greater than 50% devoted to face-face counseling and coordination of care regarding diagnosis and management plan    Follow up if symptoms worsen or fail to improve.           Follow up if symptoms worsen or fail to improve.

## 2023-10-07 ENCOUNTER — PATIENT MESSAGE (OUTPATIENT)
Dept: FAMILY MEDICINE | Facility: CLINIC | Age: 38
End: 2023-10-07
Payer: MEDICAID

## 2023-10-07 DIAGNOSIS — R42 DIZZINESS: Primary | ICD-10-CM

## 2023-10-07 DIAGNOSIS — R42 DIZZINESS AND GIDDINESS: ICD-10-CM

## 2023-10-07 DIAGNOSIS — R42 VERTIGO: ICD-10-CM

## 2023-10-09 ENCOUNTER — ON-DEMAND VIRTUAL (OUTPATIENT)
Dept: URGENT CARE | Facility: CLINIC | Age: 38
End: 2023-10-09
Payer: MEDICAID

## 2023-10-09 DIAGNOSIS — R42 VERTIGO: Primary | ICD-10-CM

## 2023-10-09 PROCEDURE — 99212 OFFICE O/P EST SF 10 MIN: CPT | Mod: 95,,,

## 2023-10-09 PROCEDURE — 99212 PR OFFICE/OUTPT VISIT, EST, LEVL II, 10-19 MIN: ICD-10-PCS | Mod: 95,,,

## 2023-10-09 NOTE — PROGRESS NOTES
Subjective:      Patient ID: Doroteo Sun is a 38 y.o. female.    Vitals:  vitals were not taken for this visit.     Chief Complaint: No chief complaint on file.      Visit Type: TELE AUDIOVISUAL    Present with the patient at the time of consultation: TELEMED PRESENT WITH PATIENT: None    Past Medical History:   Diagnosis Date    Acne     Anxiety     Chronic constipation     Herpes labialis 12/7/2016    Hypertension      History reviewed. No pertinent surgical history.  Review of patient's allergies indicates:   Allergen Reactions    Apple      Current Outpatient Medications on File Prior to Visit   Medication Sig Dispense Refill    clindamycin (CLEOCIN T) 1 % external solution Apply topically 2 (two) times daily. 30 mL 0    clotrimazole-betamethasone 1-0.05% (LOTRISONE) cream Apply topically 2 (two) times daily. 45 g 0    fluticasone propionate (FLONASE) 50 mcg/actuation nasal spray 2 sprays (100 mcg total) by Each Nostril route once daily. 16 g 2    gabapentin (NEURONTIN) 100 MG capsule Take 1 capsule (100 mg total) by mouth 2 (two) times daily as needed. 30 capsule 11    hydroCHLOROthiazide (HYDRODIURIL) 12.5 MG Tab Take 1 tablet (12.5 mg total) by mouth daily as needed (for fluid retention in legs). 30 tablet 11    hydrOXYzine HCL (ATARAX) 25 MG tablet Take 1 tablet (25 mg total) by mouth 3 (three) times daily as needed for Itching. 30 tablet 5    ibuprofen (ADVIL,MOTRIN) 800 MG tablet Take 1 tablet (800 mg total) by mouth 3 (three) times daily as needed for Pain. 60 tablet 1    linaCLOtide (LINZESS) 290 mcg Cap capsule Take 1 capsule (290 mcg total) by mouth before breakfast. 90 capsule 3    lubiprostone (AMITIZA) 8 MCG Cap Take 1 capsule (8 mcg total) by mouth 2 (two) times daily. 180 capsule 3    meclizine (ANTIVERT) 25 mg tablet Take 1 tablet (25 mg total) by mouth 3 (three) times daily as needed for Dizziness. 10 tablet 0    mupirocin (BACTROBAN) 2 % ointment Apply topically 2 (two) times daily. 22 g 0     propranoloL (INDERAL) 10 MG tablet Take 1 tablet (10 mg total) by mouth 2 (two) times daily. 60 tablet 11    sertraline (ZOLOFT) 100 MG tablet Take 2 tablets (200 mg total) by mouth once daily. 60 tablet 11    triamcinolone acetonide 0.1% (KENALOG) 0.1 % cream       trifarotene (AKLIEF) 0.005 % Crea Apply 1 Pump topically every evening. To entire face 45 g 5     No current facility-administered medications on file prior to visit.     Family History   Problem Relation Age of Onset    Hypertension Maternal Grandmother     Cancer Neg Hx     Diabetes Neg Hx     Breast cancer Neg Hx     Colon cancer Neg Hx     Ovarian cancer Neg Hx     Cervical cancer Neg Hx     Endometrial cancer Neg Hx     Vaginal cancer Neg Hx     Melanoma Neg Hx            [facility] Peq Odvv Intake    10/9/2023 10:46 AM CDT - Filed by Patient   Describe your reason for todays visit Consistent dizziness   What is your current physical address in the event of a medical emergency?    Are you able to take your vital signs? Yes   Systolic Blood Pressure: 121   Diastolic Blood Pressure: 84   Weight: 191   Height:    Pulse: 88   Temperature: 97.6   Respiration rate:    Pulse Oxygen:    Please attach any relevant images or files          Patient states that she has been having dizziness. Patient states that she was seen Friday for this and states that she is continuing to have these symptoms. Patient is requesting to have a CT performed and blood work. Patient states that she has made numerous attempts to get in touch with her PCP and states that no one will contact her back.         Constitution: Negative.   HENT: Negative.     Neck: neck negative.   Cardiovascular: Negative.    Eyes: Negative.    Respiratory: Negative.     Gastrointestinal: Negative.    Endocrine: negative.   Genitourinary: Negative.    Musculoskeletal: Negative.    Skin: Negative.    Allergic/Immunologic: Negative.    Neurological:  Positive for dizziness and history of vertigo.    Hematologic/Lymphatic: Negative.    Psychiatric/Behavioral: Negative.          Objective:   The physical exam was conducted virtually.  Physical Exam   Eyes: Conjunctivae are normal. Pupils are equal, round, and reactive to light. Extraocular movement intact   Neck: Neck supple.   Pulmonary/Chest: Effort normal.   Musculoskeletal: Normal range of motion.         General: Normal range of motion.   Neurological: no focal deficit. She is alert.   Skin: Skin is warm.   Psychiatric: Her behavior is normal. Mood, judgment and thought content normal.       Assessment:     1. Vertigo        Plan:       Vertigo

## 2023-10-09 NOTE — PATIENT INSTRUCTIONS
You must understand that you've received an Urgent Care treatment only and that you may be released before all your medical problems are known or treated. You, the patient, will arrange for follow up care as instructed.  Follow up with your PCP or specialty clinic as directed in the next 1-2 weeks if not improved or as needed.  You can call [phone] to schedule an appointment with the appropriate provider.  If your condition worsens we recommend that you receive another evaluation at the emergency room immediately or contact your primary medical clinics after hours call service to discuss your concerns.  Please return here or go to the Emergency Department for any concerns or worsening of condition.  Please if you smoke please consider quitting. [facility] Smoke cessation hotline number is [phone], available at this number is free counseling and medications to live a healthier life!      Follow up with your PCP for further testing request.

## 2023-10-11 ENCOUNTER — HOSPITAL ENCOUNTER (OUTPATIENT)
Dept: RADIOLOGY | Facility: HOSPITAL | Age: 38
Discharge: HOME | End: 2023-10-11
Attending: FAMILY MEDICINE
Payer: MEDICAID

## 2023-10-11 DIAGNOSIS — R42 VERTIGO: ICD-10-CM

## 2023-10-11 DIAGNOSIS — R42 DIZZINESS: ICD-10-CM

## 2023-10-11 DIAGNOSIS — R42 DIZZINESS AND GIDDINESS: ICD-10-CM

## 2023-10-11 PROCEDURE — 93880 US CAROTID BILATERAL: ICD-10-PCS | Mod: 26,,, | Performed by: INTERNAL MEDICINE

## 2023-10-11 PROCEDURE — 70450 CT HEAD WITHOUT CONTRAST: ICD-10-PCS | Mod: 26,,, | Performed by: RADIOLOGY

## 2023-10-11 PROCEDURE — 70450 CT HEAD/BRAIN W/O DYE: CPT | Mod: 26,,, | Performed by: RADIOLOGY

## 2023-10-11 PROCEDURE — 70450 CT HEAD/BRAIN W/O DYE: CPT | Mod: TC

## 2023-10-11 PROCEDURE — 93880 EXTRACRANIAL BILAT STUDY: CPT | Mod: 26,,, | Performed by: INTERNAL MEDICINE

## 2023-10-11 PROCEDURE — 93880 EXTRACRANIAL BILAT STUDY: CPT | Mod: TC

## 2023-10-12 ENCOUNTER — LAB VISIT (OUTPATIENT)
Dept: LAB | Facility: HOSPITAL | Age: 38
End: 2023-10-12
Attending: FAMILY MEDICINE
Payer: MEDICAID

## 2023-10-12 DIAGNOSIS — R42 DIZZINESS AND GIDDINESS: ICD-10-CM

## 2023-10-12 DIAGNOSIS — R42 DIZZINESS: ICD-10-CM

## 2023-10-12 DIAGNOSIS — R42 VERTIGO: ICD-10-CM

## 2023-10-12 LAB
ALBUMIN SERPL BCP-MCNC: 3.5 G/DL (ref 3.5–5.2)
ALP SERPL-CCNC: 49 U/L (ref 55–135)
ALT SERPL W/O P-5'-P-CCNC: 13 U/L (ref 10–44)
ANION GAP SERPL CALC-SCNC: 7 MMOL/L (ref 8–16)
AST SERPL-CCNC: 19 U/L (ref 10–40)
BASOPHILS # BLD AUTO: 0.03 K/UL (ref 0–0.2)
BASOPHILS NFR BLD: 0.7 % (ref 0–1.9)
BILIRUB SERPL-MCNC: 0.6 MG/DL (ref 0.1–1)
BUN SERPL-MCNC: 14 MG/DL (ref 6–20)
CALCIUM SERPL-MCNC: 9.3 MG/DL (ref 8.7–10.5)
CHLORIDE SERPL-SCNC: 103 MMOL/L (ref 95–110)
CO2 SERPL-SCNC: 27 MMOL/L (ref 23–29)
CREAT SERPL-MCNC: 0.9 MG/DL (ref 0.5–1.4)
DIFFERENTIAL METHOD: ABNORMAL
EOSINOPHIL # BLD AUTO: 0.1 K/UL (ref 0–0.5)
EOSINOPHIL NFR BLD: 2 % (ref 0–8)
ERYTHROCYTE [DISTWIDTH] IN BLOOD BY AUTOMATED COUNT: 14.6 % (ref 11.5–14.5)
EST. GFR  (NO RACE VARIABLE): >60 ML/MIN/1.73 M^2
GLUCOSE SERPL-MCNC: 73 MG/DL (ref 70–110)
HCT VFR BLD AUTO: 37.8 % (ref 37–48.5)
HGB BLD-MCNC: 11.4 G/DL (ref 12–16)
IMM GRANULOCYTES # BLD AUTO: 0.05 K/UL (ref 0–0.04)
IMM GRANULOCYTES NFR BLD AUTO: 1.2 % (ref 0–0.5)
LYMPHOCYTES # BLD AUTO: 1.1 K/UL (ref 1–4.8)
LYMPHOCYTES NFR BLD: 27 % (ref 18–48)
MAGNESIUM SERPL-MCNC: 1.8 MG/DL (ref 1.6–2.6)
MCH RBC QN AUTO: 26.4 PG (ref 27–31)
MCHC RBC AUTO-ENTMCNC: 30.2 G/DL (ref 32–36)
MCV RBC AUTO: 88 FL (ref 82–98)
MONOCYTES # BLD AUTO: 0.5 K/UL (ref 0.3–1)
MONOCYTES NFR BLD: 11.2 % (ref 4–15)
NEUTROPHILS # BLD AUTO: 2.3 K/UL (ref 1.8–7.7)
NEUTROPHILS NFR BLD: 57.9 % (ref 38–73)
NRBC BLD-RTO: 0 /100 WBC
PHOSPHATE SERPL-MCNC: 2.5 MG/DL (ref 2.7–4.5)
PLATELET # BLD AUTO: 343 K/UL (ref 150–450)
PMV BLD AUTO: 9.7 FL (ref 9.2–12.9)
POTASSIUM SERPL-SCNC: 4 MMOL/L (ref 3.5–5.1)
PROT SERPL-MCNC: 7.3 G/DL (ref 6–8.4)
RBC # BLD AUTO: 4.32 M/UL (ref 4–5.4)
SODIUM SERPL-SCNC: 137 MMOL/L (ref 136–145)
WBC # BLD AUTO: 4.03 K/UL (ref 3.9–12.7)

## 2023-10-12 PROCEDURE — 83735 ASSAY OF MAGNESIUM: CPT | Performed by: FAMILY MEDICINE

## 2023-10-12 PROCEDURE — 36415 COLL VENOUS BLD VENIPUNCTURE: CPT | Performed by: FAMILY MEDICINE

## 2023-10-12 PROCEDURE — 84100 ASSAY OF PHOSPHORUS: CPT | Performed by: FAMILY MEDICINE

## 2023-10-12 PROCEDURE — 85025 COMPLETE CBC W/AUTO DIFF WBC: CPT | Performed by: FAMILY MEDICINE

## 2023-10-12 PROCEDURE — 80053 COMPREHEN METABOLIC PANEL: CPT | Performed by: FAMILY MEDICINE

## 2024-01-25 ENCOUNTER — TELEPHONE (OUTPATIENT)
Dept: FAMILY MEDICINE | Facility: CLINIC | Age: 39
End: 2024-01-25
Payer: MEDICAID

## 2024-01-25 NOTE — TELEPHONE ENCOUNTER
----- Message from Valeri Sow sent at 1/25/2024  3:29 PM CST -----  Type:  Patient Returning Call    Who Called: pt  Who Left Message for Patient: Moriah Kam MA  Does the patient know what this is regarding?:  Would the patient rather a call back or a response via [facility]? Call   Best Call Back Number: [phone]  Additional Information:

## 2024-01-25 NOTE — TELEPHONE ENCOUNTER
Spoke to pt and stated that she has a sinus infection. There were no available appts at [facility], [facility] or [facility]. Pt was informed that she could go to Urgent Care.

## 2024-02-08 DIAGNOSIS — R03.0 ELEVATED BP WITHOUT DIAGNOSIS OF HYPERTENSION: ICD-10-CM

## 2024-02-08 DIAGNOSIS — G44.219 EPISODIC TENSION-TYPE HEADACHE, NOT INTRACTABLE: ICD-10-CM

## 2024-02-08 RX ORDER — PROPRANOLOL HYDROCHLORIDE 10 MG/1
10 TABLET ORAL 2 TIMES DAILY
Qty: 60 TABLET | Refills: 11 | Status: SHIPPED | OUTPATIENT
Start: 2024-02-08 | End: 2025-02-07

## 2024-02-08 RX ORDER — IBUPROFEN 800 MG/1
800 TABLET ORAL 3 TIMES DAILY PRN
Qty: 60 TABLET | Refills: 1 | Status: SHIPPED | OUTPATIENT
Start: 2024-02-08 | End: 2024-05-06 | Stop reason: SDUPTHER

## 2024-02-08 NOTE — TELEPHONE ENCOUNTER
No care due was identified.  [facility] Embedded Care Due Messages. Reference number: [id number].   2/08/2024 1:25:45 PM CST

## 2024-02-08 NOTE — TELEPHONE ENCOUNTER
No care due was identified.  [facility] Embedded Care Due Messages. Reference number: [id number].   2/08/2024 1:26:32 PM CST

## 2024-03-06 DIAGNOSIS — F41.9 ANXIETY: ICD-10-CM

## 2024-03-06 RX ORDER — HYDROXYZINE HYDROCHLORIDE 25 MG/1
25 TABLET, FILM COATED ORAL
Qty: 30 TABLET | Refills: 0 | Status: SHIPPED | OUTPATIENT
Start: 2024-03-06

## 2024-03-06 RX ORDER — SERTRALINE HYDROCHLORIDE 100 MG/1
200 TABLET, FILM COATED ORAL
Qty: 180 TABLET | Refills: 0 | Status: SHIPPED | OUTPATIENT
Start: 2024-03-06

## 2024-03-06 NOTE — TELEPHONE ENCOUNTER
Refill Routing Note   Medication(s) are not appropriate for processing by [facility] for the following reason(s):        Outside of protocol    [facility] action(s):  Approve  Route               Appointments  past 12m or future 3m with PCP    Date Provider   Last Visit   3/10/2023 Babatunde Sunshine MD   Next Visit   Visit date not found Babatunde Sunshine MD   ED visits in past 90 days: 0        Note composed:12:40 PM 03/06/2024

## 2024-03-06 NOTE — TELEPHONE ENCOUNTER
No care due was identified.  [facility] Embedded Care Due Messages. Reference number: [id number].   3/06/2024 8:20:31 AM CST

## 2024-05-06 ENCOUNTER — OFFICE VISIT (OUTPATIENT)
Dept: FAMILY MEDICINE | Facility: CLINIC | Age: 39
End: 2024-05-06
Payer: MEDICAID

## 2024-05-06 VITALS — BODY MASS INDEX: 31.99 KG/M2 | HEIGHT: 66 IN | WEIGHT: 199.06 LBS

## 2024-05-06 DIAGNOSIS — K59.09 CHRONIC CONSTIPATION: ICD-10-CM

## 2024-05-06 DIAGNOSIS — M77.11 LATERAL EPICONDYLITIS OF RIGHT ELBOW: Primary | ICD-10-CM

## 2024-05-06 PROCEDURE — 1159F MED LIST DOCD IN RCRD: CPT | Mod: CPTII,,, | Performed by: FAMILY MEDICINE

## 2024-05-06 PROCEDURE — 99999 PR PBB SHADOW E&M-EST. PATIENT-LVL III: CPT | Mod: PBBFAC,,, | Performed by: FAMILY MEDICINE

## 2024-05-06 PROCEDURE — 3008F BODY MASS INDEX DOCD: CPT | Mod: CPTII,,, | Performed by: FAMILY MEDICINE

## 2024-05-06 PROCEDURE — 99213 OFFICE O/P EST LOW 20 MIN: CPT | Mod: PBBFAC,PO | Performed by: FAMILY MEDICINE

## 2024-05-06 PROCEDURE — 99214 OFFICE O/P EST MOD 30 MIN: CPT | Mod: S$PBB,,, | Performed by: FAMILY MEDICINE

## 2024-05-06 RX ORDER — IBUPROFEN 800 MG/1
800 TABLET ORAL 3 TIMES DAILY PRN
Qty: 60 TABLET | Refills: 0 | Status: SHIPPED | OUTPATIENT
Start: 2024-05-06

## 2024-05-06 NOTE — PROGRESS NOTES
(Portions of this note were dictated using voice recognition software and may contain dictation related errors in spelling/grammar/syntax not found on text review)    CC:   Chief Complaint   Patient presents with    Elbow Injury     Pt noted since 2 weeks    Constipation     Pt stated she would like to go back to [facility]       HPI: 38 y.o. female pt of Dr Sunshine presented for evaluation of elbow pain and medication refill as a new patient to me.  She has medical history significant for anxiety, chronic idiopathic constipation, hypertension, herpes labialis.      Patient reports having elbow pain for the last 2 weeks, started suddenly without any inciting factor, located on lateral side of the elbow, describes as throbbing and sharp pain, symptoms worsens with physical activity, she has been dropping the objects due to sharp pain with , denies having any trauma/fall.      She recalls 2 months ago she did deep cleaning of whole house during which she over stretched and overexerted herself.  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    Her other concern is having constipation, she has chronic constipation for which you used to take Linzess 290 mg, script  and she was unable to get the refills, reports symptoms came back and she would like to have prescription refills.    No other symptoms or concerns.    Past Medical History:   Diagnosis Date    Acne     Anxiety     Chronic constipation     Herpes labialis 2016    Hypertension        No past surgical history on file.    Family History   Problem Relation Name Age of Onset    Hypertension Maternal Grandmother      Cancer Neg Hx      Diabetes Neg Hx      Breast cancer Neg Hx      Colon cancer Neg Hx      Ovarian cancer Neg Hx      Cervical cancer Neg Hx      Endometrial cancer Neg Hx      Vaginal cancer Neg Hx      Melanoma Neg Hx         Social  History     Tobacco Use    Smoking status: Never    Smokeless tobacco: Never   Substance Use Topics    Alcohol use: Yes     Alcohol/week: 0.0 standard drinks of alcohol     Comment: Socially.    Drug use: No       Lab Results   Component Value Date    WBC 4.03 10/12/2023    HGB 11.4 (L) 10/12/2023    HCT 37.8 10/12/2023    MCV 88 10/12/2023     10/12/2023    CHOL 181 01/12/2023    TRIG 44 01/12/2023    HDL 66 01/12/2023    ALT 13 10/12/2023    AST 19 10/12/2023    BILITOT 0.6 10/12/2023    ALKPHOS 49 (L) 10/12/2023     10/12/2023    K 4.0 10/12/2023     10/12/2023    CREATININE 0.9 10/12/2023    ESTGFRAFRICA >60.0 05/27/2019    EGFRNONAA >60.0 05/27/2019    CALCIUM 9.3 10/12/2023    ALBUMIN 3.5 10/12/2023    BUN 14 10/12/2023    CO2 27 10/12/2023    TSH 1.218 01/12/2023    INR 1.0 11/16/2016    HGBA1C 4.7 01/12/2023    LDLCALC 106.2 01/12/2023    GLU 73 10/12/2023             Vital signs reviewed  PE:   APPEARANCE: Well nourished, well developed, in no acute distress.    HEAD: Normocephalic, atraumatic.  EYES: EOMI.  Conjunctivae noninjected.  NOSE: Mucosa pink. Airway clear.  NECK: Supple with no cervical lymphadenopathy.    CHEST: Good inspiratory effort. Lungs clear to auscultation with no wheezes or crackles.  CARDIOVASCULAR: Normal S1, S2. No rubs, murmurs, or gallops.  ABDOMEN: Bowel sounds normal. Not distended. Soft. No tenderness or masses. No organomegaly.  EXTREMITIES: No edema, cyanosis, or clubbing.  Elbow: TTP on lateral epicondyle and tendon beneath it, ROM normal but restricted in extension of arm and wrist due to pain      Review of Systems   Constitutional:  Negative for chills, fatigue and fever.   HENT: Negative.     Respiratory:  Negative for cough, shortness of breath and wheezing.    Cardiovascular:  Negative for chest pain, palpitations and leg swelling.   Gastrointestinal: Negative.    Genitourinary: Negative.    Musculoskeletal:  Positive for arthralgias.   Neurological:  Negative.    Psychiatric/Behavioral: Negative.     All other systems reviewed and are negative.      IMPRESSION  1. Lateral epicondylitis of right elbow    2. Chronic constipation            PLAN      1. Chronic constipation    - linaCLOtide (LINZESS) 290 mcg Cap capsule; Take 1 capsule (290 mcg total) by mouth before breakfast.  Dispense: 90 capsule; Refill: 2      2. Lateral epicondylitis of right elbow    Conservative treatment recommended     Oral and topical NSAIDs advised, ibuprofen prescription sent     Advised to use ice, rest and activity modification     Can used elbow brace for support    - ibuprofen (ADVIL,MOTRIN) 800 MG tablet; Take 1 tablet (800 mg total) by mouth 3 (three) times daily as needed for Pain.  Dispense: 60 tablet; Refill: 0       Exercises discussed to treat it, offered to send print outs, she would like to do search online    Referral to PT/OT or with evaluation with Orthopedics/injectables can be considered if symptoms do not improve with conservative treatment      Return ED/urgent care precautions discussed    Age/demographic appropriate health maintenance:    Health Maintenance Due   Topic Date Due    COVID-19 Vaccine (4 - 2023-24 season) 09/01/2023           Spent adequate time in obtaining history and explaining differentials     30 minutes spent during this visit of which greater than 50% devoted to face-face counseling and coordination of care regarding diagnosis and management plan       Sussy Ortiz   5/6/2024

## 2024-07-17 DIAGNOSIS — M77.11 LATERAL EPICONDYLITIS OF RIGHT ELBOW: ICD-10-CM

## 2024-07-17 DIAGNOSIS — R03.0 ELEVATED BP WITHOUT DIAGNOSIS OF HYPERTENSION: ICD-10-CM

## 2024-07-17 DIAGNOSIS — G44.219 EPISODIC TENSION-TYPE HEADACHE, NOT INTRACTABLE: ICD-10-CM

## 2024-07-18 RX ORDER — PROPRANOLOL HYDROCHLORIDE 10 MG/1
10 TABLET ORAL 2 TIMES DAILY
Qty: 60 TABLET | Refills: 11 | Status: SHIPPED | OUTPATIENT
Start: 2024-07-18 | End: 2025-07-18

## 2024-07-18 RX ORDER — HYDROXYZINE HYDROCHLORIDE 25 MG/1
25 TABLET, FILM COATED ORAL 3 TIMES DAILY PRN
Qty: 30 TABLET | Refills: 0 | Status: SHIPPED | OUTPATIENT
Start: 2024-07-18

## 2024-07-18 RX ORDER — IBUPROFEN 800 MG/1
800 TABLET ORAL 3 TIMES DAILY PRN
Qty: 60 TABLET | Refills: 0 | Status: SHIPPED | OUTPATIENT
Start: 2024-07-18

## 2024-07-18 NOTE — TELEPHONE ENCOUNTER
No care due was identified.  [facility] Embedded Care Due Messages. Reference number: [id number].   7/17/2024 8:21:56 PM CDT

## 2024-09-13 DIAGNOSIS — L70.0 ACNE VULGARIS: ICD-10-CM

## 2024-09-16 RX ORDER — TRIFAROTENE 50 UG/G
1 CREAM TOPICAL NIGHTLY
Qty: 45 G | Refills: 5 | OUTPATIENT
Start: 2024-09-16

## 2024-09-26 ENCOUNTER — TELEPHONE (OUTPATIENT)
Dept: OPTOMETRY | Facility: CLINIC | Age: 39
End: 2024-09-26
Payer: MEDICAID

## 2024-09-26 NOTE — TELEPHONE ENCOUNTER
----- Message from Ashley Correa sent at 9/26/2024  9:09 AM CDT -----  Type:   Call    Who Called:pt  Does the patient know what this is regarding?:Rescheduling Appt scheduled for today  Would the patient rather a call back or a response via [facility]? call  Best Call Back Number: [phone]  Additional Information:

## 2024-11-01 ENCOUNTER — PATIENT MESSAGE (OUTPATIENT)
Dept: OPTOMETRY | Facility: CLINIC | Age: 39
End: 2024-11-01
Payer: MEDICAID

## 2024-11-01 ENCOUNTER — TELEPHONE (OUTPATIENT)
Dept: OPTOMETRY | Facility: CLINIC | Age: 39
End: 2024-11-01
Payer: MEDICAID

## 2024-11-15 ENCOUNTER — TELEPHONE (OUTPATIENT)
Dept: OPTOMETRY | Facility: CLINIC | Age: 39
End: 2024-11-15
Payer: MEDICAID

## 2024-11-22 DIAGNOSIS — F41.9 ANXIETY: ICD-10-CM

## 2024-11-22 RX ORDER — SERTRALINE HYDROCHLORIDE 100 MG/1
200 TABLET, FILM COATED ORAL
Qty: 180 TABLET | Refills: 0 | Status: SHIPPED | OUTPATIENT
Start: 2024-11-22

## 2024-11-22 NOTE — TELEPHONE ENCOUNTER
Care Due:                  Date            Visit Type   Department     Provider  --------------------------------------------------------------------------------                                EP -                              PRIMARY      [facility] FAMILY    Jose J Wilson  Last Visit: 10-      CARE ([facility])   MEDICINE       Mounika  Next Visit: None Scheduled  None         None Found                                                            Last  Test          Frequency    Reason                     Performed    Due Date  --------------------------------------------------------------------------------    Office Visit  12 months..  ibuprofen, propranoloL,    10-   09-                             sertraline...............    CBC.........  12 months..  ibuprofen................  10-   10-    Cr..........  12 months..  ibuprofen................  10-   10-    [facility] Embedded Care Due Messages. Reference number: [id number].   11/22/2024 8:53:01 AM CST

## 2025-01-24 RX ORDER — HYDROXYZINE HYDROCHLORIDE 25 MG/1
25 TABLET, FILM COATED ORAL
Qty: 30 TABLET | Refills: 0 | Status: SHIPPED | OUTPATIENT
Start: 2025-01-24

## 2025-02-17 ENCOUNTER — TELEPHONE (OUTPATIENT)
Dept: FAMILY MEDICINE | Facility: CLINIC | Age: 40
End: 2025-02-17
Payer: MEDICAID

## 2025-02-17 NOTE — TELEPHONE ENCOUNTER
Contacted pt regarding appointment request for a physical. Informed pt the next available was not until March and pt stated that was fine, and she prefers a morning appointment. Appt. Scheduled for 03/24/25 @10:00AM. Pt verbalized understanding.

## 2025-03-24 ENCOUNTER — OFFICE VISIT (OUTPATIENT)
Dept: FAMILY MEDICINE | Facility: CLINIC | Age: 40
End: 2025-03-24
Payer: MEDICAID

## 2025-03-24 ENCOUNTER — RESULTS FOLLOW-UP (OUTPATIENT)
Dept: FAMILY MEDICINE | Facility: CLINIC | Age: 40
End: 2025-03-24

## 2025-03-24 ENCOUNTER — LAB VISIT (OUTPATIENT)
Dept: LAB | Facility: HOSPITAL | Age: 40
End: 2025-03-24
Attending: FAMILY MEDICINE
Payer: MEDICAID

## 2025-03-24 VITALS
TEMPERATURE: 98 F | WEIGHT: 181.69 LBS | OXYGEN SATURATION: 96 % | HEIGHT: 66 IN | HEART RATE: 96 BPM | BODY MASS INDEX: 29.2 KG/M2 | SYSTOLIC BLOOD PRESSURE: 110 MMHG | DIASTOLIC BLOOD PRESSURE: 82 MMHG

## 2025-03-24 DIAGNOSIS — I10 HYPERTENSION, UNSPECIFIED TYPE: ICD-10-CM

## 2025-03-24 DIAGNOSIS — Z23 NEED FOR DIPHTHERIA-TETANUS-PERTUSSIS (TDAP) VACCINE: ICD-10-CM

## 2025-03-24 DIAGNOSIS — K59.04 CHRONIC IDIOPATHIC CONSTIPATION: ICD-10-CM

## 2025-03-24 DIAGNOSIS — Z00.00 ROUTINE GENERAL MEDICAL EXAMINATION AT A HEALTH CARE FACILITY: ICD-10-CM

## 2025-03-24 DIAGNOSIS — F41.9 ANXIETY: ICD-10-CM

## 2025-03-24 DIAGNOSIS — Z00.00 ROUTINE GENERAL MEDICAL EXAMINATION AT A HEALTH CARE FACILITY: Primary | ICD-10-CM

## 2025-03-24 LAB
ABSOLUTE EOSINOPHIL (OHS): 0.16 K/UL
ABSOLUTE MONOCYTE (OHS): 0.67 K/UL (ref 0.3–1)
ABSOLUTE NEUTROPHIL COUNT (OHS): 2.36 K/UL (ref 1.8–7.7)
ALBUMIN SERPL BCP-MCNC: 3.5 G/DL (ref 3.5–5.2)
ALP SERPL-CCNC: 55 UNIT/L (ref 40–150)
ALT SERPL W/O P-5'-P-CCNC: 12 UNIT/L (ref 10–44)
ANION GAP (OHS): 8 MMOL/L (ref 8–16)
AST SERPL-CCNC: 18 UNIT/L (ref 11–45)
BASOPHILS # BLD AUTO: 0.03 K/UL
BASOPHILS NFR BLD AUTO: 0.7 %
BILIRUB SERPL-MCNC: 0.8 MG/DL (ref 0.1–1)
BUN SERPL-MCNC: 13 MG/DL (ref 6–20)
CALCIUM SERPL-MCNC: 9.3 MG/DL (ref 8.7–10.5)
CHLORIDE SERPL-SCNC: 106 MMOL/L (ref 95–110)
CHOLEST SERPL-MCNC: 170 MG/DL (ref 120–199)
CHOLEST/HDLC SERPL: 3.4 {RATIO} (ref 2–5)
CO2 SERPL-SCNC: 25 MMOL/L (ref 23–29)
CREAT SERPL-MCNC: 0.8 MG/DL (ref 0.5–1.4)
EAG (OHS): 97 MG/DL (ref 68–131)
ERYTHROCYTE [DISTWIDTH] IN BLOOD BY AUTOMATED COUNT: 14.3 % (ref 11.5–14.5)
GFR SERPLBLD CREATININE-BSD FMLA CKD-EPI: >60 ML/MIN/1.73/M2
GLUCOSE SERPL-MCNC: 66 MG/DL (ref 70–110)
HBA1C MFR BLD: 5 % (ref 4–5.6)
HCT VFR BLD AUTO: 39.2 % (ref 37–48.5)
HDLC SERPL-MCNC: 50 MG/DL (ref 40–75)
HDLC SERPL: 29.4 % (ref 20–50)
HGB BLD-MCNC: 12 GM/DL (ref 12–16)
IMM GRANULOCYTES # BLD AUTO: 0.03 K/UL (ref 0–0.04)
IMM GRANULOCYTES NFR BLD AUTO: 0.7 % (ref 0–0.5)
LDLC SERPL CALC-MCNC: 107.4 MG/DL (ref 63–159)
LYMPHOCYTES # BLD AUTO: 1.15 K/UL (ref 1–4.8)
MCH RBC QN AUTO: 25.3 PG (ref 27–50)
MCHC RBC AUTO-ENTMCNC: 30.6 G/DL (ref 32–36)
MCV RBC AUTO: 83 FL (ref 82–98)
NONHDLC SERPL-MCNC: 120 MG/DL
NUCLEATED RBC (/100WBC) (OHS): 0 /100 WBC
PLATELET # BLD AUTO: 323 K/UL (ref 150–450)
PMV BLD AUTO: 10 FL (ref 9.2–12.9)
POTASSIUM SERPL-SCNC: 3.7 MMOL/L (ref 3.5–5.1)
PROT SERPL-MCNC: 7.7 GM/DL (ref 6–8.4)
RBC # BLD AUTO: 4.74 M/UL (ref 4–5.4)
RELATIVE EOSINOPHIL (OHS): 3.6 %
RELATIVE LYMPHOCYTE (OHS): 26.1 % (ref 18–48)
RELATIVE MONOCYTE (OHS): 15.2 % (ref 4–15)
RELATIVE NEUTROPHIL (OHS): 53.7 % (ref 38–73)
SODIUM SERPL-SCNC: 139 MMOL/L (ref 136–145)
T4 FREE SERPL-MCNC: NORMAL NG/DL
TRIGL SERPL-MCNC: 63 MG/DL (ref 30–150)
TSH SERPL-ACNC: 1.32 UIU/ML (ref 0.4–4)
WBC # BLD AUTO: 4.4 K/UL (ref 3.9–12.7)

## 2025-03-24 PROCEDURE — 80061 LIPID PANEL: CPT

## 2025-03-24 PROCEDURE — 3074F SYST BP LT 130 MM HG: CPT | Mod: CPTII,,, | Performed by: FAMILY MEDICINE

## 2025-03-24 PROCEDURE — 99999 PR PBB SHADOW E&M-EST. PATIENT-LVL IV: CPT | Mod: PBBFAC,,, | Performed by: FAMILY MEDICINE

## 2025-03-24 PROCEDURE — 99999PBSHW PR PBB SHADOW TECHNICAL ONLY FILED TO HB: Mod: PBBFAC,,,

## 2025-03-24 PROCEDURE — 1159F MED LIST DOCD IN RCRD: CPT | Mod: CPTII,,, | Performed by: FAMILY MEDICINE

## 2025-03-24 PROCEDURE — 99395 PREV VISIT EST AGE 18-39: CPT | Mod: 25,S$PBB,, | Performed by: FAMILY MEDICINE

## 2025-03-24 PROCEDURE — 90715 TDAP VACCINE 7 YRS/> IM: CPT | Mod: PBBFAC,PO

## 2025-03-24 PROCEDURE — 85025 COMPLETE CBC W/AUTO DIFF WBC: CPT

## 2025-03-24 PROCEDURE — 84443 ASSAY THYROID STIM HORMONE: CPT

## 2025-03-24 PROCEDURE — 3008F BODY MASS INDEX DOCD: CPT | Mod: CPTII,,, | Performed by: FAMILY MEDICINE

## 2025-03-24 PROCEDURE — 90471 IMMUNIZATION ADMIN: CPT | Mod: PBBFAC,PO

## 2025-03-24 PROCEDURE — 3079F DIAST BP 80-89 MM HG: CPT | Mod: CPTII,,, | Performed by: FAMILY MEDICINE

## 2025-03-24 PROCEDURE — 80053 COMPREHEN METABOLIC PANEL: CPT

## 2025-03-24 PROCEDURE — 36415 COLL VENOUS BLD VENIPUNCTURE: CPT

## 2025-03-24 PROCEDURE — 99214 OFFICE O/P EST MOD 30 MIN: CPT | Mod: PBBFAC,PO | Performed by: FAMILY MEDICINE

## 2025-03-24 PROCEDURE — 83036 HEMOGLOBIN GLYCOSYLATED A1C: CPT

## 2025-03-24 RX ORDER — BUSPIRONE HYDROCHLORIDE 5 MG/1
5 TABLET ORAL 2 TIMES DAILY
Qty: 60 TABLET | Refills: 11 | Status: SHIPPED | OUTPATIENT
Start: 2025-03-24 | End: 2026-03-24

## 2025-03-24 RX ADMIN — TETANUS TOXOID, REDUCED DIPHTHERIA TOXOID AND ACELLULAR PERTUSSIS VACCINE, ADSORBED 0.5 ML: 5; 2.5; 8; 8; 2.5 SUSPENSION INTRAMUSCULAR at 10:03

## 2025-03-24 NOTE — PATIENT INSTRUCTIONS
Opportunities to get set up for counseling services       1. Employee Assistance Program (EAP) through work (can check with your human resources dept to see what counseling services are available through this.)    2. Through insurance company list of covered counselors/social workers    3. [employer] website database of mental health providers    4. Counseling through [facility] Contact Information (looking for psychologist or  for talk therapy)  Phone        [phone]        Address [address] [facility] [address]  [place] [address]      List of some mental health providers in the area

## 2025-03-24 NOTE — PROGRESS NOTES
(Portions of this note were dictated using voice recognition software and may contain dictation related errors in spelling/grammar/syntax not found on text review)         HPI: 39 y.o. female  Last visit 2023     Anxiety, was on Lexapro but stopped because of weight gain, currently on sertraline 200 mg daily .  Overall Zoloft has been helping but she does notice some increased anxiety and stress which is work related.  Did counseling 1 time a few years ago but did not really find like she connected with the counselor.    Hypertension on hydrochlorothiazide 12.5 mg daily and propranolol 10 mg b.i.d. BP is stable    Chronic constipation on Linzess  290 mcg daily      Past Medical History:   Diagnosis Date    Acne     Anxiety     Chronic constipation     Herpes labialis 12/07/2016    Hypertension        History reviewed. No pertinent surgical history.    Family History   Problem Relation Name Age of Onset    Hypertension Maternal Grandmother      Cancer Neg Hx      Diabetes Neg Hx      Breast cancer Neg Hx      Colon cancer Neg Hx      Ovarian cancer Neg Hx      Cervical cancer Neg Hx      Endometrial cancer Neg Hx      Vaginal cancer Neg Hx      Melanoma Neg Hx         Social History     Tobacco Use    Smoking status: Never    Smokeless tobacco: Never   Substance Use Topics    Alcohol use: Yes     Alcohol/week: 0.0 standard drinks of alcohol     Comment: Socially.    Drug use: No       Lab Results   Component Value Date    WBC 4.03 10/12/2023    HGB 11.4 (L) 10/12/2023    HCT 37.8 10/12/2023    MCV 88 10/12/2023     10/12/2023    CHOL 181 01/12/2023    TRIG 44 01/12/2023    HDL 66 01/12/2023    ALT 13 10/12/2023    AST 19 10/12/2023    BILITOT 0.6 10/12/2023    ALKPHOS 49 (L) 10/12/2023     10/12/2023    K 4.0 10/12/2023     10/12/2023    CREATININE 0.9 10/12/2023    ESTGFRAFRICA >60.0 05/27/2019    EGFRNONAA >60.0 05/27/2019    CALCIUM 9.3 10/12/2023    ALBUMIN 3.5 10/12/2023    BUN 14 10/12/2023     CO2 27 10/12/2023    TSH 1.218 01/12/2023    INR 1.0 11/16/2016    HGBA1C 4.7 01/12/2023    LDLCALC 106.2 01/12/2023    GLU 73 10/12/2023       Vital signs reviewed  PE:   APPEARANCE: Well nourished, well developed, in no acute distress.    HEAD: Normocephalic, atraumatic.  EYES: PERRL. EOMI.   Conjunctivae noninjected.  EARS: TM's intact. Light reflex normal. No retraction or perforation  NOSE: Mucosa pink. Airway clear.  MOUTH & THROAT: No tonsillar enlargement. No pharyngeal erythema or exudate.   NECK: Supple with no cervical lymphadenopathy.  No carotid bruits.  No thyromegaly  CHEST: Good inspiratory effort. Lungs clear to auscultation with no wheezes or crackles.  CARDIOVASCULAR: Normal S1, S2. No rubs, murmurs, or gallops.  ABDOMEN: Bowel sounds normal. Not distended. Soft. No tenderness or masses. No organomegaly.  EXTREMITIES: No edema         IMPRESSION  1. Routine general medical examination at a health care facility    2. Hypertension, unspecified type    3. Chronic idiopathic constipation    4. Anxiety    5. Need for diphtheria-tetanus-pertussis (Tdap) vaccine              PLAN  Continue zoloft 200 mg daily.  Advise on counseling.  Can try adding BuSpar 5 mg b.i.d. for breakthrough anxiety symptoms.  Continue bp check.  Continue hydrochlorothiazide and propranolol  Continue Linzess for chronic constipation  Screening labs     SCREENINGS  Pap 3/10/ 2025 colon ascus, HPV positive (Dr. Mclean) f/u colpo on 4/4/25    Immunizations   Tdap today  Flu has not had this year, declines  COVID has not had this year, declines

## 2025-04-05 ENCOUNTER — ON-DEMAND VIRTUAL (OUTPATIENT)
Dept: URGENT CARE | Facility: CLINIC | Age: 40
End: 2025-04-05
Payer: MEDICAID

## 2025-04-05 DIAGNOSIS — J32.9 SINUSITIS, UNSPECIFIED CHRONICITY, UNSPECIFIED LOCATION: Primary | ICD-10-CM

## 2025-04-05 PROCEDURE — 98005 SYNCH AUDIO-VIDEO EST LOW 20: CPT | Mod: 95,,, | Performed by: PHYSICIAN ASSISTANT

## 2025-04-05 RX ORDER — AMOXICILLIN 875 MG/1
875 TABLET, FILM COATED ORAL EVERY 12 HOURS
Qty: 20 TABLET | Refills: 0 | Status: SHIPPED | OUTPATIENT
Start: 2025-04-05 | End: 2025-04-15

## 2025-04-05 NOTE — PROGRESS NOTES
Subjective:      Patient ID: Doroteo Sun is a 39 y.o. female.    Vitals:  vitals were not taken for this visit.     Chief Complaint: Sinus Problem      Visit Type: TELE AUDIOVISUAL    Patient Location: Home     Present with the patient at the time of consultation: TELEMED PRESENT WITH PATIENT: None    Past Medical History:   Diagnosis Date    Acne     Anxiety     Chronic constipation     Herpes labialis 12/07/2016    Hypertension      History reviewed. No pertinent surgical history.  Review of patient's allergies indicates:   Allergen Reactions    Apple      Medications Ordered Prior to Encounter[1]  Family History   Problem Relation Name Age of Onset    Hypertension Maternal Grandmother      Cancer Neg Hx      Diabetes Neg Hx      Breast cancer Neg Hx      Colon cancer Neg Hx      Ovarian cancer Neg Hx      Cervical cancer Neg Hx      Endometrial cancer Neg Hx      Vaginal cancer Neg Hx      Melanoma Neg Hx         Medications Ordered                [facility] #[id number] - [place] - [address] AT [facility] [address] [address]    Telephone: [phone]   Fax: [phone]   Hours: Not open 24 hours                         E-Prescribed (1 of 1)              amoxicillin (AMOXIL) 875 MG tablet    Sig: Take 1 tablet (875 mg total) by mouth every 12 (twelve) hours. for 10 days       Start: 4/5/25     Quantity: 20 tablet Refills: 0                           Ohs Peq Odvv Intake    4/5/2025  6:41 AM CDT - Filed by Patient   What is your current physical address in the event of a medical emergency? [address]   Are you able to take your vital signs? No   Please attach any relevant images or files    Is your employer contracted with [facility]? No         Sinus Problem  This is a new problem. Episode onset: allergies for weeks. 3 days of pain in right sinus. The problem is unchanged. There has been no fever. Associated symptoms include congestion,  headaches, sinus pressure and sneezing. Pertinent negatives include no coughing, hoarse voice, neck pain or sore throat. Treatments tried: tylenol cold and sinus. The treatment provided mild relief.       HENT:  Positive for congestion and sinus pressure. Negative for sore throat.    Neck: Negative for neck pain.   Respiratory:  Negative for cough.    Allergic/Immunologic: Positive for sneezing.   Neurological:  Positive for headaches.        Objective:   The physical exam was conducted virtually.  Physical Exam   HENT:   Nose: Congestion present.   Abdominal: Normal appearance.   Neurological: She is alert.       Assessment:     1. Sinusitis, unspecified chronicity, unspecified location        Plan:       Sinusitis, unspecified chronicity, unspecified location    Other orders  -     amoxicillin (AMOXIL) 875 MG tablet; Take 1 tablet (875 mg total) by mouth every 12 (twelve) hours. for 10 days  Dispense: 20 tablet; Refill: 0                          [1]   Current Outpatient Medications on File Prior to Visit   Medication Sig Dispense Refill    busPIRone (BUSPAR) 5 MG Tab Take 1 tablet (5 mg total) by mouth 2 (two) times daily. 60 tablet 11    clindamycin (CLEOCIN T) 1 % external solution Apply topically 2 (two) times daily. (Patient not taking: Reported on 3/24/2025) 30 mL 0    clotrimazole-betamethasone 1-0.05% (LOTRISONE) cream Apply topically 2 (two) times daily. (Patient not taking: Reported on 3/24/2025) 45 g 0    fluticasone propionate (FLONASE) 50 mcg/actuation nasal spray 2 sprays (100 mcg total) by Each Nostril route once daily. 16 g 2    gabapentin (NEURONTIN) 100 MG capsule Take 1 capsule (100 mg total) by mouth 2 (two) times daily as needed. 30 capsule 11    hydroCHLOROthiazide (HYDRODIURIL) 12.5 MG Tab Take 1 tablet (12.5 mg total) by mouth daily as needed (for fluid retention in legs). 30 tablet 11    hydrOXYzine HCL (ATARAX) 25 MG tablet TAKE 1 TABLET BY MOUTH THREE TIMES DAILY AS NEEDED FOR ITCHING 30  tablet 0    ibuprofen (ADVIL,MOTRIN) 800 MG tablet Take 1 tablet (800 mg total) by mouth 3 (three) times daily as needed for Pain. 60 tablet 0    linaCLOtide (LINZESS) 290 mcg Cap capsule Take 1 capsule (290 mcg total) by mouth before breakfast. 90 capsule 2    meclizine (ANTIVERT) 25 mg tablet Take 1 tablet (25 mg total) by mouth 3 (three) times daily as needed for Dizziness. 10 tablet 0    mupirocin (BACTROBAN) 2 % ointment Apply topically 2 (two) times daily. 22 g 0    propranoloL (INDERAL) 10 MG tablet Take 1 tablet (10 mg total) by mouth 2 (two) times daily. 60 tablet 11    sertraline (ZOLOFT) 100 MG tablet Take 2 tablets by mouth once daily 180 tablet 0    triamcinolone acetonide 0.1% (KENALOG) 0.1 % cream  (Patient not taking: Reported on 3/24/2025)      trifarotene (AKLIEF) 0.005 % Crea Apply 1 Pump topically every evening. To entire face (Patient not taking: Reported on 3/24/2025) 45 g 5     No current facility-administered medications on file prior to visit.

## 2025-04-05 NOTE — PATIENT INSTRUCTIONS
You have been diagnosed with a sinus infection.   I have prescribed and antibiotic. Please start today and take until finished.   You can also take over the counter meds such as Flonase, Claritin, Dayquil etc.     If your symptoms persist please follow up with your pcp.  If you experience any severe symptoms go to the ER.      Thank you for choosing [facility]!    Our goal in the [facility] to always provide outstanding medical care. You may receive a survey by mail or e-mail in the next week regarding your experience today. We would greatly appreciate you completing and returning the survey. Your feedback provides us with a way to recognize our staff who provide very good care, and it helps us learn how to improve when your experience was below our aspiration of excellence.         We appreciate you trusting us with your medical care. We hope you feel better soon. We will be happy to take care of you for all of your future medical needs.    You must understand that you've received Virtual  treatment only and that you may be released before all your medical problems are known or treated. You, the patient, will arrange for follow up care as instructed.    Follow up with your PCP or specialty clinic as directed in the next 1-2 weeks if not improved or as needed.  You can call [phone] to schedule an appointment with the appropriate provider.    If your condition worsens we recommend that you receive another evaluation in person, with your primary care provider, urgent care or at the emergency room immediately or contact your primary medical clinics after hours call service to discuss your concerns.

## 2025-04-09 ENCOUNTER — ON-DEMAND VIRTUAL (OUTPATIENT)
Dept: URGENT CARE | Facility: CLINIC | Age: 40
End: 2025-04-09
Payer: MEDICAID

## 2025-04-09 DIAGNOSIS — J01.90 ACUTE SINUSITIS, RECURRENCE NOT SPECIFIED, UNSPECIFIED LOCATION: Primary | ICD-10-CM

## 2025-04-09 RX ORDER — AZELASTINE 1 MG/ML
1 SPRAY, METERED NASAL 2 TIMES DAILY
Qty: 30 ML | Refills: 0 | Status: SHIPPED | OUTPATIENT
Start: 2025-04-09 | End: 2025-05-09

## 2025-04-09 RX ORDER — PREDNISONE 20 MG/1
20 TABLET ORAL DAILY
Qty: 5 TABLET | Refills: 0 | Status: SHIPPED | OUTPATIENT
Start: 2025-04-09 | End: 2025-04-14

## 2025-04-09 RX ORDER — LEVOCETIRIZINE DIHYDROCHLORIDE 5 MG/1
5 TABLET, FILM COATED ORAL NIGHTLY
Qty: 30 TABLET | Refills: 0 | Status: SHIPPED | OUTPATIENT
Start: 2025-04-09 | End: 2025-05-09

## 2025-04-09 NOTE — PATIENT INSTRUCTIONS

## 2025-04-09 NOTE — PROGRESS NOTES
Subjective:      Patient ID: Doroteo Sun is a 39 y.o. female.    Vitals:  vitals were not taken for this visit.     Chief Complaint: Sinus Problem      Visit Type: TELE AUDIOVISUAL    Patient Location: Home [place]     Present with the patient at the time of consultation: TELEMED PRESENT WITH PATIENT: None    Past Medical History:   Diagnosis Date    Acne     Anxiety     Chronic constipation     Herpes labialis 2016    Hypertension      History reviewed. No pertinent surgical history.  Review of patient's allergies indicates:   Allergen Reactions    Apple      Medications Ordered Prior to Encounter[1]  Family History   Problem Relation Name Age of Onset    Hypertension Maternal Grandmother      Cancer Neg Hx      Diabetes Neg Hx      Breast cancer Neg Hx      Colon cancer Neg Hx      Ovarian cancer Neg Hx      Cervical cancer Neg Hx      Endometrial cancer Neg Hx      Vaginal cancer Neg Hx      Melanoma Neg Hx         Medications Ordered                [facility] #[id number] - [place] - [address] AT [facility] [place] [address] [address]    Telephone: [phone]   Fax: [phone]   Hours: Not open 24 hours                         E-Prescribed (3 of 3)              azelastine (ASTELIN) 137 mcg (0.1 %) nasal spray    Si spray (137 mcg total) by Nasal route 2 (two) times daily.       Start: 25     Quantity: 30 mL Refills: 0                         levocetirizine (XYZAL) 5 MG tablet    Sig: Take 1 tablet (5 mg total) by mouth every evening.       Start: 25     Quantity: 30 tablet Refills: 0                         predniSONE (DELTASONE) 20 MG tablet    Sig: Take 1 tablet (20 mg total) by mouth once daily. for 5 days       Start: 25     Quantity: 5 tablet Refills: 0                           Ohs Peq Odvv Intake    2025  8:51 AM CDT - Filed by Patient   What is your current physical address in the event of a medical emergency?    Are you able  to take your vital signs? No   Please attach any relevant images or files    Is your employer contracted with [facility]? No         Pt  presents with c/o sinus pressure/pain x 1 week w/ associated thick discolored mucous and pressure to head.   OTC meds not helping, recent virtual visit on 4/5/2025, with antibiotic, still having some pressure. Notes that steroid usually helps also   Denies CP, SOB, wheezing, dizziness, nvd.     Sinus Problem  This is a new problem. The current episode started 1 to 4 weeks ago. The problem has been gradually worsening since onset. Associated symptoms include congestion, coughing, headaches and sinus pressure. Pertinent negatives include no chills, shortness of breath or sore throat.       Constitution: Negative for chills and fever.   HENT:  Positive for congestion, sinus pain and sinus pressure. Negative for sore throat.    Cardiovascular:  Negative for chest pain.   Respiratory:  Positive for cough and sputum production. Negative for shortness of breath.    Neurological:  Positive for headaches. Negative for dizziness.        Objective:   The physical exam was conducted virtually.  Physical Exam   Constitutional: She is oriented to person, place, and time. No distress.   HENT:   Head: Normocephalic.   Ears:   Right Ear: External ear normal.   Left Ear: External ear normal.   Nose: No rhinorrhea or congestion.   Eyes: Conjunctivae are normal.   Neck: Neck supple.   Pulmonary/Chest: Effort normal. No respiratory distress.   Neurological: She is alert and oriented to person, place, and time.   Skin: Skin is no rash.       Assessment:     1. Acute sinusitis, recurrence not specified, unspecified location        Plan:   Increase fluids and rest  Pedialyte, powerade, gatorade  Take meds as directed  Warm salt water gargles, tea with honey, chlorseptic spray, throat lozenges   Limit foods that are salty, spicy food, limit carbonated drinks, limit acidic drinks  Tylenol or  Motrin as directed for fever or body aches  Continue antihistamine (zyrtec or Claritin), Flonase and saline nasal spray  Okay to take Robitussin DM, Mucinex DM, Dayquil/Nyquil as directed    If increased Shortness of breath or difficulty breathing go to the Urgent Care or ER  If symptoms worsening or not improved f/u in Urgent Care or with PCP    Acute sinusitis, recurrence not specified, unspecified location  -     azelastine (ASTELIN) 137 mcg (0.1 %) nasal spray; 1 spray (137 mcg total) by Nasal route 2 (two) times daily.  Dispense: 30 mL; Refill: 0  -     levocetirizine (XYZAL) 5 MG tablet; Take 1 tablet (5 mg total) by mouth every evening.  Dispense: 30 tablet; Refill: 0  -     predniSONE (DELTASONE) 20 MG tablet; Take 1 tablet (20 mg total) by mouth once daily. for 5 days  Dispense: 5 tablet; Refill: 0      We appreciate you trusting us with your medical care. We hope you feel better soon. We will be happy to take care of you for all of your future medical needs.     You must understand that you've received Virtual treatment only and that you may be released before all your medical problems are known or treated. You, the patient, will arrange for follow up care as instructed.     Follow up with your PCP or specialty clinic as directed in the next 1-2 weeks if not improved or as needed. You can call [phone] to schedule an appointment with the appropriate provider.     If your condition worsens we recommend that you receive another evaluation in person, with your primary care provider, urgent care or at the emergency room immediately or contact your primary medical clinics after hours call service to discuss your concerns.                   [1]   Current Outpatient Medications on File Prior to Visit   Medication Sig Dispense Refill    amoxicillin (AMOXIL) 875 MG tablet Take 1 tablet (875 mg total) by mouth every 12 (twelve) hours. for 10 days 20 tablet 0    busPIRone (BUSPAR) 5 MG Tab Take 1 tablet (5 mg  total) by mouth 2 (two) times daily. 60 tablet 11    clindamycin (CLEOCIN T) 1 % external solution Apply topically 2 (two) times daily. (Patient not taking: Reported on 3/24/2025) 30 mL 0    clotrimazole-betamethasone 1-0.05% (LOTRISONE) cream Apply topically 2 (two) times daily. (Patient not taking: Reported on 3/24/2025) 45 g 0    fluticasone propionate (FLONASE) 50 mcg/actuation nasal spray 2 sprays (100 mcg total) by Each Nostril route once daily. 16 g 2    gabapentin (NEURONTIN) 100 MG capsule Take 1 capsule (100 mg total) by mouth 2 (two) times daily as needed. 30 capsule 11    hydroCHLOROthiazide (HYDRODIURIL) 12.5 MG Tab Take 1 tablet (12.5 mg total) by mouth daily as needed (for fluid retention in legs). 30 tablet 11    hydrOXYzine HCL (ATARAX) 25 MG tablet TAKE 1 TABLET BY MOUTH THREE TIMES DAILY AS NEEDED FOR ITCHING 30 tablet 0    ibuprofen (ADVIL,MOTRIN) 800 MG tablet Take 1 tablet (800 mg total) by mouth 3 (three) times daily as needed for Pain. 60 tablet 0    linaCLOtide (LINZESS) 290 mcg Cap capsule Take 1 capsule (290 mcg total) by mouth before breakfast. 90 capsule 2    meclizine (ANTIVERT) 25 mg tablet Take 1 tablet (25 mg total) by mouth 3 (three) times daily as needed for Dizziness. 10 tablet 0    mupirocin (BACTROBAN) 2 % ointment Apply topically 2 (two) times daily. 22 g 0    propranoloL (INDERAL) 10 MG tablet Take 1 tablet (10 mg total) by mouth 2 (two) times daily. 60 tablet 11    sertraline (ZOLOFT) 100 MG tablet Take 2 tablets by mouth once daily 180 tablet 0    triamcinolone acetonide 0.1% (KENALOG) 0.1 % cream  (Patient not taking: Reported on 3/24/2025)      trifarotene (AKLIEF) 0.005 % Crea Apply 1 Pump topically every evening. To entire face (Patient not taking: Reported on 3/24/2025) 45 g 5     No current facility-administered medications on file prior to visit.

## 2025-07-23 DIAGNOSIS — M77.11 LATERAL EPICONDYLITIS OF RIGHT ELBOW: ICD-10-CM

## 2025-07-23 NOTE — TELEPHONE ENCOUNTER
No care due was identified.  [facility] Embedded Care Due Messages. Reference number: [id number].   7/23/2025 6:40:41 PM CDT

## 2025-07-24 RX ORDER — IBUPROFEN 800 MG/1
800 TABLET, FILM COATED ORAL
Qty: 60 TABLET | Refills: 0 | Status: SHIPPED | OUTPATIENT
Start: 2025-07-24

## 2025-08-19 ENCOUNTER — ON-DEMAND VIRTUAL (OUTPATIENT)
Dept: URGENT CARE | Facility: CLINIC | Age: 40
End: 2025-08-19
Payer: MEDICAID

## 2025-08-19 ENCOUNTER — TELEPHONE (OUTPATIENT)
Dept: URGENT CARE | Facility: CLINIC | Age: 40
End: 2025-08-19

## 2025-08-19 DIAGNOSIS — N64.4 BREAST PAIN, LEFT: Primary | ICD-10-CM

## 2025-08-19 PROCEDURE — 98005 SYNCH AUDIO-VIDEO EST LOW 20: CPT | Mod: 95,,, | Performed by: PHYSICIAN ASSISTANT

## 2025-08-27 ENCOUNTER — HOSPITAL ENCOUNTER (OUTPATIENT)
Dept: RADIOLOGY | Facility: HOSPITAL | Age: 40
Discharge: HOME | End: 2025-08-27
Attending: PHYSICIAN ASSISTANT
Payer: MEDICAID

## 2025-08-27 DIAGNOSIS — N64.4 BREAST PAIN, LEFT: ICD-10-CM

## 2025-08-27 DIAGNOSIS — R92.8 ABNORMAL MAMMOGRAM: ICD-10-CM

## 2025-08-27 PROCEDURE — 77066 DX MAMMO INCL CAD BI: CPT | Mod: 26,,, | Performed by: RADIOLOGY

## 2025-08-27 PROCEDURE — 77062 BREAST TOMOSYNTHESIS BI: CPT | Mod: 26,,, | Performed by: RADIOLOGY

## 2025-08-27 PROCEDURE — 76642 ULTRASOUND BREAST LIMITED: CPT | Mod: TC,50

## 2025-08-27 PROCEDURE — 76642 ULTRASOUND BREAST LIMITED: CPT | Mod: 26,50,, | Performed by: RADIOLOGY

## 2025-08-27 PROCEDURE — 77066 DX MAMMO INCL CAD BI: CPT | Mod: TC

## 2025-08-30 ENCOUNTER — ON-DEMAND VIRTUAL (OUTPATIENT)
Dept: URGENT CARE | Facility: CLINIC | Age: 40
End: 2025-08-30
Payer: MEDICAID